# Patient Record
Sex: MALE | Race: WHITE | NOT HISPANIC OR LATINO | Employment: OTHER | URBAN - METROPOLITAN AREA
[De-identification: names, ages, dates, MRNs, and addresses within clinical notes are randomized per-mention and may not be internally consistent; named-entity substitution may affect disease eponyms.]

---

## 2017-10-25 ENCOUNTER — APPOINTMENT (OUTPATIENT)
Dept: CARDIOLOGY | Facility: HOSPITAL | Age: 67
End: 2017-10-25
Attending: INTERNAL MEDICINE

## 2017-10-25 ENCOUNTER — HOSPITAL ENCOUNTER (INPATIENT)
Facility: HOSPITAL | Age: 67
LOS: 2 days | Discharge: HOME | End: 2017-10-27
Attending: INTERNAL MEDICINE | Admitting: INTERNAL MEDICINE

## 2017-10-25 ENCOUNTER — APPOINTMENT (OUTPATIENT)
Dept: GENERAL RADIOLOGY | Facility: HOSPITAL | Age: 67
End: 2017-10-25

## 2017-10-25 DIAGNOSIS — I21.3 ST ELEVATION MYOCARDIAL INFARCTION (STEMI), UNSPECIFIED ARTERY (HCC): Primary | ICD-10-CM

## 2017-10-25 LAB
ALBUMIN SERPL-MCNC: 4.1 G/DL (ref 3.5–5.2)
ALBUMIN/GLOB SERPL: 1.1 G/DL
ALP SERPL-CCNC: 110 U/L (ref 39–117)
ALT SERPL W P-5'-P-CCNC: 36 U/L (ref 1–41)
ANION GAP SERPL CALCULATED.3IONS-SCNC: 15.5 MMOL/L
AST SERPL-CCNC: 25 U/L (ref 1–40)
BASOPHILS # BLD AUTO: 0.03 10*3/MM3 (ref 0–0.2)
BASOPHILS NFR BLD AUTO: 0.2 % (ref 0–1.5)
BH CV ECHO MEAS - ACS: 3 CM
BH CV ECHO MEAS - AO MEAN PG (FULL): 1 MMHG
BH CV ECHO MEAS - AO MEAN PG: 3 MMHG
BH CV ECHO MEAS - AO ROOT AREA (BSA CORRECTED): 1.5
BH CV ECHO MEAS - AO ROOT AREA: 10.2 CM^2
BH CV ECHO MEAS - AO ROOT DIAM: 3.6 CM
BH CV ECHO MEAS - AO V2 MEAN: 77.1 CM/SEC
BH CV ECHO MEAS - AO V2 VTI: 19 CM
BH CV ECHO MEAS - AVA(I,A): 5 CM^2
BH CV ECHO MEAS - AVA(I,D): 5 CM^2
BH CV ECHO MEAS - BSA(HAYCOCK): 2.5 M^2
BH CV ECHO MEAS - BSA: 2.4 M^2
BH CV ECHO MEAS - BZI_BMI: 36.5 KILOGRAMS/M^2
BH CV ECHO MEAS - BZI_METRIC_HEIGHT: 180.3 CM
BH CV ECHO MEAS - BZI_METRIC_WEIGHT: 118.8 KG
BH CV ECHO MEAS - CONTRAST EF (2CH): 59 ML/M^2
BH CV ECHO MEAS - CONTRAST EF 4CH: 52.8 ML/M^2
BH CV ECHO MEAS - EDV(CUBED): 103.8 ML
BH CV ECHO MEAS - EDV(MOD-SP2): 83 ML
BH CV ECHO MEAS - EDV(MOD-SP4): 123 ML
BH CV ECHO MEAS - EDV(TEICH): 102.4 ML
BH CV ECHO MEAS - EF(CUBED): 65.4 %
BH CV ECHO MEAS - EF(MOD-SP2): 59 %
BH CV ECHO MEAS - EF(MOD-SP4): 52.8 %
BH CV ECHO MEAS - EF(TEICH): 56.9 %
BH CV ECHO MEAS - ESV(CUBED): 35.9 ML
BH CV ECHO MEAS - ESV(MOD-SP2): 34 ML
BH CV ECHO MEAS - ESV(MOD-SP4): 58 ML
BH CV ECHO MEAS - ESV(TEICH): 44.1 ML
BH CV ECHO MEAS - FS: 29.8 %
BH CV ECHO MEAS - IVS/LVPW: 1.2
BH CV ECHO MEAS - IVSD: 1.3 CM
BH CV ECHO MEAS - LAT PEAK E' VEL: 8.5 CM/SEC
BH CV ECHO MEAS - LV DIASTOLIC VOL/BSA (35-75): 52 ML/M^2
BH CV ECHO MEAS - LV MASS(C)D: 212 GRAMS
BH CV ECHO MEAS - LV MASS(C)DI: 89.6 GRAMS/M^2
BH CV ECHO MEAS - LV MEAN PG: 2 MMHG
BH CV ECHO MEAS - LV SYSTOLIC VOL/BSA (12-30): 24.5 ML/M^2
BH CV ECHO MEAS - LV V1 MEAN: 72.5 CM/SEC
BH CV ECHO MEAS - LV V1 VTI: 19.3 CM
BH CV ECHO MEAS - LVIDD: 4.7 CM
BH CV ECHO MEAS - LVIDS: 3.3 CM
BH CV ECHO MEAS - LVLD AP2: 8 CM
BH CV ECHO MEAS - LVLD AP4: 8.6 CM
BH CV ECHO MEAS - LVLS AP2: 7.3 CM
BH CV ECHO MEAS - LVLS AP4: 7.3 CM
BH CV ECHO MEAS - LVOT AREA (M): 4.9 CM^2
BH CV ECHO MEAS - LVOT AREA: 4.9 CM^2
BH CV ECHO MEAS - LVOT DIAM: 2.5 CM
BH CV ECHO MEAS - LVPWD: 1.1 CM
BH CV ECHO MEAS - MED PEAK E' VEL: 6.6 CM/SEC
BH CV ECHO MEAS - MV A DUR: 0.17 SEC
BH CV ECHO MEAS - MV A MAX VEL: 85.9 CM/SEC
BH CV ECHO MEAS - MV DEC SLOPE: 522 CM/SEC^2
BH CV ECHO MEAS - MV DEC TIME: 0.16 SEC
BH CV ECHO MEAS - MV E MAX VEL: 83.4 CM/SEC
BH CV ECHO MEAS - MV E/A: 0.97
BH CV ECHO MEAS - MV MEAN PG: 2 MMHG
BH CV ECHO MEAS - MV P1/2T MAX VEL: 95 CM/SEC
BH CV ECHO MEAS - MV P1/2T: 53.3 MSEC
BH CV ECHO MEAS - MV V2 MEAN: 64.8 CM/SEC
BH CV ECHO MEAS - MV V2 VTI: 24.1 CM
BH CV ECHO MEAS - MVA P1/2T LCG: 2.3 CM^2
BH CV ECHO MEAS - MVA(P1/2T): 4.1 CM^2
BH CV ECHO MEAS - MVA(VTI): 3.9 CM^2
BH CV ECHO MEAS - PA ACC SLOPE: 726 CM/SEC^2
BH CV ECHO MEAS - PA ACC TIME: 0.08 SEC
BH CV ECHO MEAS - PA MAX PG (FULL): 0.56 MMHG
BH CV ECHO MEAS - PA MAX PG: 1.5 MMHG
BH CV ECHO MEAS - PA PR(ACCEL): 44.4 MMHG
BH CV ECHO MEAS - PA V2 MAX: 62.1 CM/SEC
BH CV ECHO MEAS - PI END-D VEL: 29.1 CM/SEC
BH CV ECHO MEAS - PULM A REVS DUR: 0.16 SEC
BH CV ECHO MEAS - PULM A REVS VEL: 27.7 CM/SEC
BH CV ECHO MEAS - PULM DIAS VEL: 46.8 CM/SEC
BH CV ECHO MEAS - PULM S/D: 0.9
BH CV ECHO MEAS - PULM SYS VEL: 42 CM/SEC
BH CV ECHO MEAS - PVA(V,A): 3.9 CM^2
BH CV ECHO MEAS - PVA(V,D): 3.9 CM^2
BH CV ECHO MEAS - QP/QS: 0.61
BH CV ECHO MEAS - RV MAX PG: 0.98 MMHG
BH CV ECHO MEAS - RV MEAN PG: 1 MMHG
BH CV ECHO MEAS - RV V1 MAX: 49.6 CM/SEC
BH CV ECHO MEAS - RV V1 MEAN: 37.9 CM/SEC
BH CV ECHO MEAS - RV V1 VTI: 11.8 CM
BH CV ECHO MEAS - RVOT AREA: 4.9 CM^2
BH CV ECHO MEAS - RVOT DIAM: 2.5 CM
BH CV ECHO MEAS - SI(AO): 81.8 ML/M^2
BH CV ECHO MEAS - SI(CUBED): 28.7 ML/M^2
BH CV ECHO MEAS - SI(LVOT): 40.1 ML/M^2
BH CV ECHO MEAS - SI(MOD-SP2): 20.7 ML/M^2
BH CV ECHO MEAS - SI(MOD-SP4): 27.5 ML/M^2
BH CV ECHO MEAS - SI(TEICH): 24.6 ML/M^2
BH CV ECHO MEAS - SV(AO): 193.4 ML
BH CV ECHO MEAS - SV(CUBED): 67.9 ML
BH CV ECHO MEAS - SV(LVOT): 94.7 ML
BH CV ECHO MEAS - SV(MOD-SP2): 49 ML
BH CV ECHO MEAS - SV(MOD-SP4): 65 ML
BH CV ECHO MEAS - SV(RVOT): 57.9 ML
BH CV ECHO MEAS - SV(TEICH): 58.2 ML
BH CV ECHO MEAS - TAPSE (>1.6): 2 CM2
BH CV XLRA - RV BASE: 3.4 CM
BH CV XLRA - RV LENGTH: 8.2 CM
BH CV XLRA - RV MID: 2.9 CM
BH CV XLRA - TDI S': 11.7 CM/SEC
BILIRUB SERPL-MCNC: 0.6 MG/DL (ref 0.1–1.2)
BUN BLD-MCNC: 22 MG/DL (ref 8–23)
BUN/CREAT SERPL: 18.8 (ref 7–25)
CALCIUM SPEC-SCNC: 9.4 MG/DL (ref 8.6–10.5)
CHLORIDE SERPL-SCNC: 101 MMOL/L (ref 98–107)
CO2 SERPL-SCNC: 23.5 MMOL/L (ref 22–29)
CREAT BLD-MCNC: 1.17 MG/DL (ref 0.76–1.27)
DEPRECATED RDW RBC AUTO: 50.4 FL (ref 37–54)
E/E' RATIO: 11.2
EOSINOPHIL # BLD AUTO: 0.01 10*3/MM3 (ref 0–0.7)
EOSINOPHIL NFR BLD AUTO: 0.1 % (ref 0.3–6.2)
ERYTHROCYTE [DISTWIDTH] IN BLOOD BY AUTOMATED COUNT: 14.2 % (ref 11.5–14.5)
GFR SERPL CREATININE-BSD FRML MDRD: 63 ML/MIN/1.73
GLOBULIN UR ELPH-MCNC: 3.6 GM/DL
GLUCOSE BLD-MCNC: 146 MG/DL (ref 65–99)
GLUCOSE BLDC GLUCOMTR-MCNC: 105 MG/DL (ref 70–130)
GLUCOSE BLDC GLUCOMTR-MCNC: 108 MG/DL (ref 70–130)
GLUCOSE BLDC GLUCOMTR-MCNC: 127 MG/DL (ref 70–130)
GLUCOSE BLDC GLUCOMTR-MCNC: 171 MG/DL (ref 70–130)
HCT VFR BLD AUTO: 47.3 % (ref 40.4–52.2)
HGB BLD-MCNC: 15.5 G/DL (ref 13.7–17.6)
HOLD SPECIMEN: NORMAL
HOLD SPECIMEN: NORMAL
IMM GRANULOCYTES # BLD: 0.05 10*3/MM3 (ref 0–0.03)
IMM GRANULOCYTES NFR BLD: 0.3 % (ref 0–0.5)
LEFT ATRIUM VOLUME INDEX: 20 ML/M2
LYMPHOCYTES # BLD AUTO: 0.56 10*3/MM3 (ref 0.9–4.8)
LYMPHOCYTES NFR BLD AUTO: 3.2 % (ref 19.6–45.3)
MCH RBC QN AUTO: 31.8 PG (ref 27–32.7)
MCHC RBC AUTO-ENTMCNC: 32.8 G/DL (ref 32.6–36.4)
MCV RBC AUTO: 97.1 FL (ref 79.8–96.2)
MONOCYTES # BLD AUTO: 1.05 10*3/MM3 (ref 0.2–1.2)
MONOCYTES NFR BLD AUTO: 6.1 % (ref 5–12)
NEUTROPHILS # BLD AUTO: 15.58 10*3/MM3 (ref 1.9–8.1)
NEUTROPHILS NFR BLD AUTO: 90.1 % (ref 42.7–76)
PLATELET # BLD AUTO: 300 10*3/MM3 (ref 140–500)
PMV BLD AUTO: 11.2 FL (ref 6–12)
POTASSIUM BLD-SCNC: 4.5 MMOL/L (ref 3.5–5.2)
PROT SERPL-MCNC: 7.7 G/DL (ref 6–8.5)
RBC # BLD AUTO: 4.87 10*6/MM3 (ref 4.6–6)
SODIUM BLD-SCNC: 140 MMOL/L (ref 136–145)
TROPONIN T SERPL-MCNC: 0.03 NG/ML (ref 0–0.03)
TROPONIN T SERPL-MCNC: 4.03 NG/ML (ref 0–0.03)
WBC NRBC COR # BLD: 17.28 10*3/MM3 (ref 4.5–10.7)
WHOLE BLOOD HOLD SPECIMEN: NORMAL
WHOLE BLOOD HOLD SPECIMEN: NORMAL

## 2017-10-25 PROCEDURE — 93306 TTE W/DOPPLER COMPLETE: CPT

## 2017-10-25 PROCEDURE — C1769 GUIDE WIRE: HCPCS | Performed by: INTERNAL MEDICINE

## 2017-10-25 PROCEDURE — B2111ZZ FLUOROSCOPY OF MULTIPLE CORONARY ARTERIES USING LOW OSMOLAR CONTRAST: ICD-10-PCS | Performed by: INTERNAL MEDICINE

## 2017-10-25 PROCEDURE — 94799 UNLISTED PULMONARY SVC/PX: CPT

## 2017-10-25 PROCEDURE — 0 IOPAMIDOL PER 1 ML: Performed by: INTERNAL MEDICINE

## 2017-10-25 PROCEDURE — 85347 COAGULATION TIME ACTIVATED: CPT

## 2017-10-25 PROCEDURE — 71010 HC CHEST PA OR AP: CPT

## 2017-10-25 PROCEDURE — 4A023N7 MEASUREMENT OF CARDIAC SAMPLING AND PRESSURE, LEFT HEART, PERCUTANEOUS APPROACH: ICD-10-PCS | Performed by: INTERNAL MEDICINE

## 2017-10-25 PROCEDURE — B2151ZZ FLUOROSCOPY OF LEFT HEART USING LOW OSMOLAR CONTRAST: ICD-10-PCS | Performed by: INTERNAL MEDICINE

## 2017-10-25 PROCEDURE — C9606 PERC D-E COR REVASC W AMI S: HCPCS | Performed by: INTERNAL MEDICINE

## 2017-10-25 PROCEDURE — 25010000002 HEPARIN (PORCINE) PER 1000 UNITS: Performed by: INTERNAL MEDICINE

## 2017-10-25 PROCEDURE — 85025 COMPLETE CBC W/AUTO DIFF WBC: CPT | Performed by: FAMILY MEDICINE

## 2017-10-25 PROCEDURE — 02C13ZZ EXTIRPATION OF MATTER FROM CORONARY ARTERY, TWO ARTERIES, PERCUTANEOUS APPROACH: ICD-10-PCS | Performed by: INTERNAL MEDICINE

## 2017-10-25 PROCEDURE — 94760 N-INVAS EAR/PLS OXIMETRY 1: CPT

## 2017-10-25 PROCEDURE — 63710000001 PREDNISONE PER 5 MG: Performed by: INTERNAL MEDICINE

## 2017-10-25 PROCEDURE — C1725 CATH, TRANSLUMIN NON-LASER: HCPCS | Performed by: INTERNAL MEDICINE

## 2017-10-25 PROCEDURE — C1887 CATHETER, GUIDING: HCPCS | Performed by: INTERNAL MEDICINE

## 2017-10-25 PROCEDURE — 92921: CPT | Performed by: INTERNAL MEDICINE

## 2017-10-25 PROCEDURE — 92941 PRQ TRLML REVSC TOT OCCL AMI: CPT | Performed by: INTERNAL MEDICINE

## 2017-10-25 PROCEDURE — 25010000002 FENTANYL CITRATE (PF) 100 MCG/2ML SOLUTION: Performed by: INTERNAL MEDICINE

## 2017-10-25 PROCEDURE — C1894 INTRO/SHEATH, NON-LASER: HCPCS | Performed by: INTERNAL MEDICINE

## 2017-10-25 PROCEDURE — 93005 ELECTROCARDIOGRAM TRACING: CPT | Performed by: FAMILY MEDICINE

## 2017-10-25 PROCEDURE — 25010000002 BH (CUPID ONLY) ADENOSINE 6 MG/100ML MIXTURE: Performed by: INTERNAL MEDICINE

## 2017-10-25 PROCEDURE — 80053 COMPREHEN METABOLIC PANEL: CPT | Performed by: FAMILY MEDICINE

## 2017-10-25 PROCEDURE — 3E033PZ INTRODUCTION OF PLATELET INHIBITOR INTO PERIPHERAL VEIN, PERCUTANEOUS APPROACH: ICD-10-PCS | Performed by: INTERNAL MEDICINE

## 2017-10-25 PROCEDURE — 93005 ELECTROCARDIOGRAM TRACING: CPT | Performed by: INTERNAL MEDICINE

## 2017-10-25 PROCEDURE — 93306 TTE W/DOPPLER COMPLETE: CPT | Performed by: INTERNAL MEDICINE

## 2017-10-25 PROCEDURE — 84484 ASSAY OF TROPONIN QUANT: CPT | Performed by: INTERNAL MEDICINE

## 2017-10-25 PROCEDURE — C1757 CATH, THROMBECTOMY/EMBOLECT: HCPCS | Performed by: INTERNAL MEDICINE

## 2017-10-25 PROCEDURE — 027034Z DILATION OF CORONARY ARTERY, ONE ARTERY WITH DRUG-ELUTING INTRALUMINAL DEVICE, PERCUTANEOUS APPROACH: ICD-10-PCS | Performed by: INTERNAL MEDICINE

## 2017-10-25 PROCEDURE — C1874 STENT, COATED/COV W/DEL SYS: HCPCS | Performed by: INTERNAL MEDICINE

## 2017-10-25 PROCEDURE — 84484 ASSAY OF TROPONIN QUANT: CPT | Performed by: FAMILY MEDICINE

## 2017-10-25 PROCEDURE — 93010 ELECTROCARDIOGRAM REPORT: CPT | Performed by: INTERNAL MEDICINE

## 2017-10-25 PROCEDURE — 93458 L HRT ARTERY/VENTRICLE ANGIO: CPT | Performed by: INTERNAL MEDICINE

## 2017-10-25 PROCEDURE — 99291 CRITICAL CARE FIRST HOUR: CPT

## 2017-10-25 PROCEDURE — 25010000002 MIDAZOLAM PER 1 MG: Performed by: INTERNAL MEDICINE

## 2017-10-25 PROCEDURE — 82962 GLUCOSE BLOOD TEST: CPT

## 2017-10-25 PROCEDURE — 99223 1ST HOSP IP/OBS HIGH 75: CPT | Performed by: INTERNAL MEDICINE

## 2017-10-25 PROCEDURE — 92921 PR PRQ TRLUML CORONARY ANGIOPLASTY ADDL BRANCH: CPT | Performed by: INTERNAL MEDICINE

## 2017-10-25 PROCEDURE — 02703ZZ DILATION OF CORONARY ARTERY, ONE ARTERY, PERCUTANEOUS APPROACH: ICD-10-PCS | Performed by: INTERNAL MEDICINE

## 2017-10-25 PROCEDURE — 99284 EMERGENCY DEPT VISIT MOD MDM: CPT

## 2017-10-25 DEVICE — XIENCE ALPINE EVEROLIMUS ELUTING CORONARY STENT SYSTEM 3.50 MM X 38 MM / RAPID-EXCHANGE
Type: IMPLANTABLE DEVICE | Status: FUNCTIONAL
Brand: XIENCE ALPINE

## 2017-10-25 RX ORDER — PRAVASTATIN SODIUM 20 MG
20 TABLET ORAL DAILY
COMMUNITY
End: 2017-10-27 | Stop reason: HOSPADM

## 2017-10-25 RX ORDER — SPIRONOLACTONE 50 MG/1
50 TABLET, FILM COATED ORAL DAILY
COMMUNITY
End: 2017-10-27 | Stop reason: HOSPADM

## 2017-10-25 RX ORDER — ONDANSETRON 4 MG/1
4 TABLET, ORALLY DISINTEGRATING ORAL EVERY 6 HOURS PRN
Status: DISCONTINUED | OUTPATIENT
Start: 2017-10-25 | End: 2017-10-27 | Stop reason: HOSPADM

## 2017-10-25 RX ORDER — ONDANSETRON 2 MG/ML
4 INJECTION INTRAMUSCULAR; INTRAVENOUS EVERY 6 HOURS PRN
Status: DISCONTINUED | OUTPATIENT
Start: 2017-10-25 | End: 2017-10-27 | Stop reason: HOSPADM

## 2017-10-25 RX ORDER — ACETAMINOPHEN 325 MG/1
650 TABLET ORAL EVERY 4 HOURS PRN
Status: DISCONTINUED | OUTPATIENT
Start: 2017-10-25 | End: 2017-10-27 | Stop reason: HOSPADM

## 2017-10-25 RX ORDER — HEPARIN SODIUM 1000 [USP'U]/ML
INJECTION, SOLUTION INTRAVENOUS; SUBCUTANEOUS AS NEEDED
Status: DISCONTINUED | OUTPATIENT
Start: 2017-10-25 | End: 2017-10-25 | Stop reason: HOSPADM

## 2017-10-25 RX ORDER — PREDNISONE 1 MG/1
1 TABLET ORAL 3 TIMES DAILY
Status: DISCONTINUED | OUTPATIENT
Start: 2017-10-25 | End: 2017-10-27 | Stop reason: HOSPADM

## 2017-10-25 RX ORDER — FOLIC ACID 1 MG/1
1 TABLET ORAL DAILY
COMMUNITY
End: 2021-01-01 | Stop reason: ALTCHOICE

## 2017-10-25 RX ORDER — SODIUM CHLORIDE 9 MG/ML
50 INJECTION, SOLUTION INTRAVENOUS CONTINUOUS
Status: ACTIVE | OUTPATIENT
Start: 2017-10-25 | End: 2017-10-25

## 2017-10-25 RX ORDER — ALLOPURINOL 100 MG/1
200 TABLET ORAL DAILY
Status: DISCONTINUED | OUTPATIENT
Start: 2017-10-25 | End: 2017-10-27 | Stop reason: HOSPADM

## 2017-10-25 RX ORDER — HYDRALAZINE HYDROCHLORIDE 100 MG/1
100 TABLET, FILM COATED ORAL 2 TIMES DAILY
COMMUNITY
End: 2017-10-27 | Stop reason: HOSPADM

## 2017-10-25 RX ORDER — ALLOPURINOL 100 MG/1
200 TABLET ORAL DAILY
COMMUNITY
End: 2019-09-06 | Stop reason: DRUGHIGH

## 2017-10-25 RX ORDER — NITROGLYCERIN 20 MG/100ML
INJECTION INTRAVENOUS
Status: COMPLETED | OUTPATIENT
Start: 2017-10-25 | End: 2017-10-25

## 2017-10-25 RX ORDER — VALSARTAN 160 MG/1
320 TABLET ORAL DAILY
COMMUNITY
End: 2017-10-27 | Stop reason: HOSPADM

## 2017-10-25 RX ORDER — ASPIRIN 325 MG
325 TABLET ORAL ONCE
Status: DISCONTINUED | OUTPATIENT
Start: 2017-10-25 | End: 2017-10-27 | Stop reason: HOSPADM

## 2017-10-25 RX ORDER — PHENYLEPHRINE HCL IN 0.9% NACL 0.5 MG/5ML
SYRINGE (ML) INTRAVENOUS AS NEEDED
Status: DISCONTINUED | OUTPATIENT
Start: 2017-10-25 | End: 2017-10-25 | Stop reason: HOSPADM

## 2017-10-25 RX ORDER — AMLODIPINE BESYLATE 5 MG/1
5 TABLET ORAL DAILY
COMMUNITY
End: 2017-10-27 | Stop reason: HOSPADM

## 2017-10-25 RX ORDER — ATROPINE SULFATE 1 MG/ML
INJECTION, SOLUTION INTRAMUSCULAR; INTRAVENOUS; SUBCUTANEOUS AS NEEDED
Status: DISCONTINUED | OUTPATIENT
Start: 2017-10-25 | End: 2017-10-25 | Stop reason: HOSPADM

## 2017-10-25 RX ORDER — CLOPIDOGREL BISULFATE 75 MG/1
TABLET ORAL
Status: COMPLETED | OUTPATIENT
Start: 2017-10-25 | End: 2017-10-25

## 2017-10-25 RX ORDER — ASPIRIN 81 MG/1
81 TABLET ORAL DAILY
COMMUNITY
End: 2021-01-01

## 2017-10-25 RX ORDER — SODIUM CHLORIDE 9 MG/ML
INJECTION, SOLUTION INTRAVENOUS CONTINUOUS PRN
Status: DISCONTINUED | OUTPATIENT
Start: 2017-10-25 | End: 2017-10-25 | Stop reason: HOSPADM

## 2017-10-25 RX ORDER — ASPIRIN 81 MG/1
81 TABLET ORAL DAILY
Status: DISCONTINUED | OUTPATIENT
Start: 2017-10-25 | End: 2017-10-27 | Stop reason: HOSPADM

## 2017-10-25 RX ORDER — PREDNISONE 1 MG/1
1 TABLET ORAL 3 TIMES DAILY
COMMUNITY

## 2017-10-25 RX ORDER — VITAMIN B COMPLEX
100 TABLET ORAL DAILY
COMMUNITY
End: 2021-01-01

## 2017-10-25 RX ORDER — MORPHINE SULFATE 2 MG/ML
1 INJECTION, SOLUTION INTRAMUSCULAR; INTRAVENOUS EVERY 4 HOURS PRN
Status: DISCONTINUED | OUTPATIENT
Start: 2017-10-25 | End: 2017-10-27 | Stop reason: HOSPADM

## 2017-10-25 RX ORDER — NALOXONE HCL 0.4 MG/ML
0.4 VIAL (ML) INJECTION
Status: DISCONTINUED | OUTPATIENT
Start: 2017-10-25 | End: 2017-10-27 | Stop reason: HOSPADM

## 2017-10-25 RX ORDER — FUROSEMIDE 40 MG/1
40 TABLET ORAL DAILY
COMMUNITY
End: 2017-10-27 | Stop reason: HOSPADM

## 2017-10-25 RX ORDER — FOLIC ACID 1 MG/1
1 TABLET ORAL DAILY
Status: DISCONTINUED | OUTPATIENT
Start: 2017-10-25 | End: 2017-10-27 | Stop reason: HOSPADM

## 2017-10-25 RX ORDER — HYDROCODONE BITARTRATE AND ACETAMINOPHEN 5; 325 MG/1; MG/1
1 TABLET ORAL EVERY 4 HOURS PRN
Status: DISCONTINUED | OUTPATIENT
Start: 2017-10-25 | End: 2017-10-27 | Stop reason: HOSPADM

## 2017-10-25 RX ORDER — MIDAZOLAM HYDROCHLORIDE 1 MG/ML
INJECTION INTRAMUSCULAR; INTRAVENOUS AS NEEDED
Status: DISCONTINUED | OUTPATIENT
Start: 2017-10-25 | End: 2017-10-25 | Stop reason: HOSPADM

## 2017-10-25 RX ORDER — CLONIDINE HYDROCHLORIDE 0.1 MG/1
0.1 TABLET ORAL DAILY
COMMUNITY
End: 2017-10-27 | Stop reason: HOSPADM

## 2017-10-25 RX ORDER — ATORVASTATIN CALCIUM 80 MG/1
80 TABLET, FILM COATED ORAL DAILY
Status: DISCONTINUED | OUTPATIENT
Start: 2017-10-25 | End: 2017-10-27 | Stop reason: HOSPADM

## 2017-10-25 RX ORDER — SODIUM CHLORIDE 0.9 % (FLUSH) 0.9 %
10 SYRINGE (ML) INJECTION AS NEEDED
Status: DISCONTINUED | OUTPATIENT
Start: 2017-10-25 | End: 2017-10-27 | Stop reason: HOSPADM

## 2017-10-25 RX ORDER — FENTANYL CITRATE 50 UG/ML
INJECTION, SOLUTION INTRAMUSCULAR; INTRAVENOUS AS NEEDED
Status: DISCONTINUED | OUTPATIENT
Start: 2017-10-25 | End: 2017-10-25 | Stop reason: HOSPADM

## 2017-10-25 RX ORDER — ONDANSETRON 4 MG/1
4 TABLET, FILM COATED ORAL EVERY 6 HOURS PRN
Status: DISCONTINUED | OUTPATIENT
Start: 2017-10-25 | End: 2017-10-27 | Stop reason: HOSPADM

## 2017-10-25 RX ORDER — TEMAZEPAM 15 MG/1
15 CAPSULE ORAL NIGHTLY PRN
Status: DISCONTINUED | OUTPATIENT
Start: 2017-10-25 | End: 2017-10-27 | Stop reason: HOSPADM

## 2017-10-25 RX ADMIN — PREDNISONE 1 MG: 1 TABLET ORAL at 09:25

## 2017-10-25 RX ADMIN — PREDNISONE 1 MG: 1 TABLET ORAL at 20:04

## 2017-10-25 RX ADMIN — TICAGRELOR 90 MG: 90 TABLET ORAL at 17:40

## 2017-10-25 RX ADMIN — TICAGRELOR 90 MG: 90 TABLET ORAL at 09:24

## 2017-10-25 RX ADMIN — FOLIC ACID 1 MG: 1 TABLET ORAL at 09:25

## 2017-10-25 RX ADMIN — CLOPIDOGREL BISULFATE 600 MG: 75 TABLET, FILM COATED ORAL at 06:46

## 2017-10-25 RX ADMIN — PREDNISONE 1 MG: 1 TABLET ORAL at 17:40

## 2017-10-25 RX ADMIN — SODIUM CHLORIDE 50 ML/HR: 9 INJECTION, SOLUTION INTRAVENOUS at 08:17

## 2017-10-25 RX ADMIN — ALLOPURINOL 200 MG: 100 TABLET ORAL at 09:25

## 2017-10-25 RX ADMIN — ATORVASTATIN CALCIUM 80 MG: 80 TABLET, FILM COATED ORAL at 09:25

## 2017-10-25 RX ADMIN — ASPIRIN 81 MG: 81 TABLET ORAL at 09:25

## 2017-10-25 RX ADMIN — NITROGLYCERIN 20 MCG/MIN: 20 INJECTION INTRAVENOUS at 06:32

## 2017-10-26 ENCOUNTER — APPOINTMENT (OUTPATIENT)
Dept: GENERAL RADIOLOGY | Facility: HOSPITAL | Age: 67
End: 2017-10-26
Attending: INTERNAL MEDICINE

## 2017-10-26 LAB
ACT BLD: 268 SECONDS (ref 82–152)
ANION GAP SERPL CALCULATED.3IONS-SCNC: 9.4 MMOL/L
BUN BLD-MCNC: 20 MG/DL (ref 8–23)
BUN/CREAT SERPL: 19.8 (ref 7–25)
CALCIUM SPEC-SCNC: 8.9 MG/DL (ref 8.6–10.5)
CHLORIDE SERPL-SCNC: 103 MMOL/L (ref 98–107)
CHOLEST SERPL-MCNC: 123 MG/DL (ref 0–200)
CO2 SERPL-SCNC: 25.6 MMOL/L (ref 22–29)
CREAT BLD-MCNC: 1.01 MG/DL (ref 0.76–1.27)
DEPRECATED RDW RBC AUTO: 51.2 FL (ref 37–54)
ERYTHROCYTE [DISTWIDTH] IN BLOOD BY AUTOMATED COUNT: 14.2 % (ref 11.5–14.5)
GFR SERPL CREATININE-BSD FRML MDRD: 74 ML/MIN/1.73
GLUCOSE BLD-MCNC: 114 MG/DL (ref 65–99)
GLUCOSE BLDC GLUCOMTR-MCNC: 104 MG/DL (ref 70–130)
GLUCOSE BLDC GLUCOMTR-MCNC: 107 MG/DL (ref 70–130)
GLUCOSE BLDC GLUCOMTR-MCNC: 120 MG/DL (ref 70–130)
GLUCOSE BLDC GLUCOMTR-MCNC: 98 MG/DL (ref 70–130)
HBA1C MFR BLD: 5.11 % (ref 4.8–5.6)
HCT VFR BLD AUTO: 42.4 % (ref 40.4–52.2)
HDLC SERPL-MCNC: 39 MG/DL (ref 40–60)
HGB BLD-MCNC: 13.3 G/DL (ref 13.7–17.6)
LDLC SERPL CALC-MCNC: 69 MG/DL (ref 0–100)
LDLC/HDLC SERPL: 1.76 {RATIO}
MCH RBC QN AUTO: 31.2 PG (ref 27–32.7)
MCHC RBC AUTO-ENTMCNC: 31.4 G/DL (ref 32.6–36.4)
MCV RBC AUTO: 99.5 FL (ref 79.8–96.2)
PLATELET # BLD AUTO: 238 10*3/MM3 (ref 140–500)
PMV BLD AUTO: 10.7 FL (ref 6–12)
POTASSIUM BLD-SCNC: 4.1 MMOL/L (ref 3.5–5.2)
RBC # BLD AUTO: 4.26 10*6/MM3 (ref 4.6–6)
SODIUM BLD-SCNC: 138 MMOL/L (ref 136–145)
TRIGL SERPL-MCNC: 76 MG/DL (ref 0–150)
VLDLC SERPL-MCNC: 15.2 MG/DL (ref 5–40)
WBC NRBC COR # BLD: 16.79 10*3/MM3 (ref 4.5–10.7)

## 2017-10-26 PROCEDURE — 99232 SBSQ HOSP IP/OBS MODERATE 35: CPT | Performed by: INTERNAL MEDICINE

## 2017-10-26 PROCEDURE — 71010 HC CHEST PA OR AP: CPT

## 2017-10-26 PROCEDURE — 63710000001 PREDNISONE PER 5 MG: Performed by: INTERNAL MEDICINE

## 2017-10-26 PROCEDURE — 93005 ELECTROCARDIOGRAM TRACING: CPT | Performed by: INTERNAL MEDICINE

## 2017-10-26 PROCEDURE — 80048 BASIC METABOLIC PNL TOTAL CA: CPT | Performed by: INTERNAL MEDICINE

## 2017-10-26 PROCEDURE — 85027 COMPLETE CBC AUTOMATED: CPT | Performed by: INTERNAL MEDICINE

## 2017-10-26 PROCEDURE — 80061 LIPID PANEL: CPT | Performed by: INTERNAL MEDICINE

## 2017-10-26 PROCEDURE — 83036 HEMOGLOBIN GLYCOSYLATED A1C: CPT | Performed by: INTERNAL MEDICINE

## 2017-10-26 PROCEDURE — 93010 ELECTROCARDIOGRAM REPORT: CPT | Performed by: INTERNAL MEDICINE

## 2017-10-26 PROCEDURE — 82962 GLUCOSE BLOOD TEST: CPT

## 2017-10-26 RX ADMIN — ALLOPURINOL 200 MG: 100 TABLET ORAL at 09:10

## 2017-10-26 RX ADMIN — FOLIC ACID 1 MG: 1 TABLET ORAL at 09:10

## 2017-10-26 RX ADMIN — METOPROLOL TARTRATE 25 MG: 25 TABLET ORAL at 20:46

## 2017-10-26 RX ADMIN — TICAGRELOR 90 MG: 90 TABLET ORAL at 09:10

## 2017-10-26 RX ADMIN — ATORVASTATIN CALCIUM 80 MG: 80 TABLET, FILM COATED ORAL at 09:10

## 2017-10-26 RX ADMIN — METOPROLOL TARTRATE 25 MG: 25 TABLET ORAL at 10:32

## 2017-10-26 RX ADMIN — PREDNISONE 1 MG: 1 TABLET ORAL at 20:46

## 2017-10-26 RX ADMIN — PREDNISONE 1 MG: 1 TABLET ORAL at 16:47

## 2017-10-26 RX ADMIN — TICAGRELOR 90 MG: 90 TABLET ORAL at 17:05

## 2017-10-26 RX ADMIN — PREDNISONE 1 MG: 1 TABLET ORAL at 09:10

## 2017-10-26 RX ADMIN — ASPIRIN 81 MG: 81 TABLET ORAL at 09:10

## 2017-10-27 VITALS
SYSTOLIC BLOOD PRESSURE: 123 MMHG | RESPIRATION RATE: 18 BRPM | TEMPERATURE: 99 F | WEIGHT: 276.3 LBS | HEART RATE: 71 BPM | BODY MASS INDEX: 38.68 KG/M2 | OXYGEN SATURATION: 92 % | HEIGHT: 71 IN | DIASTOLIC BLOOD PRESSURE: 88 MMHG

## 2017-10-27 LAB
GLUCOSE BLDC GLUCOMTR-MCNC: 90 MG/DL (ref 70–130)
GLUCOSE BLDC GLUCOMTR-MCNC: 97 MG/DL (ref 70–130)

## 2017-10-27 PROCEDURE — 99238 HOSP IP/OBS DSCHRG MGMT 30/<: CPT | Performed by: INTERNAL MEDICINE

## 2017-10-27 PROCEDURE — 82962 GLUCOSE BLOOD TEST: CPT

## 2017-10-27 PROCEDURE — 63710000001 PREDNISONE PER 5 MG: Performed by: INTERNAL MEDICINE

## 2017-10-27 RX ORDER — ATORVASTATIN CALCIUM 80 MG/1
80 TABLET, FILM COATED ORAL DAILY
Qty: 30 TABLET | Refills: 5 | Status: SHIPPED | OUTPATIENT
Start: 2017-10-28 | End: 2018-01-04 | Stop reason: SINTOL

## 2017-10-27 RX ADMIN — ALLOPURINOL 200 MG: 100 TABLET ORAL at 09:00

## 2017-10-27 RX ADMIN — FOLIC ACID 1 MG: 1 TABLET ORAL at 09:00

## 2017-10-27 RX ADMIN — ATORVASTATIN CALCIUM 80 MG: 80 TABLET, FILM COATED ORAL at 09:00

## 2017-10-27 RX ADMIN — TICAGRELOR 90 MG: 90 TABLET ORAL at 09:00

## 2017-10-27 RX ADMIN — METOPROLOL TARTRATE 25 MG: 25 TABLET ORAL at 09:00

## 2017-10-27 RX ADMIN — ASPIRIN 81 MG: 81 TABLET ORAL at 09:00

## 2017-10-27 RX ADMIN — PREDNISONE 1 MG: 1 TABLET ORAL at 09:00

## 2017-11-03 ENCOUNTER — OFFICE VISIT (OUTPATIENT)
Dept: CARDIOLOGY | Facility: CLINIC | Age: 67
End: 2017-11-03

## 2017-11-03 VITALS
HEART RATE: 65 BPM | SYSTOLIC BLOOD PRESSURE: 138 MMHG | BODY MASS INDEX: 36.82 KG/M2 | DIASTOLIC BLOOD PRESSURE: 86 MMHG | OXYGEN SATURATION: 99 % | HEIGHT: 71 IN | WEIGHT: 263 LBS

## 2017-11-03 DIAGNOSIS — I25.10 CORONARY ARTERY DISEASE INVOLVING NATIVE CORONARY ARTERY OF NATIVE HEART WITHOUT ANGINA PECTORIS: Primary | ICD-10-CM

## 2017-11-03 DIAGNOSIS — Z95.5 HISTORY OF CORONARY ARTERY STENT PLACEMENT: ICD-10-CM

## 2017-11-03 PROBLEM — I21.3 ST ELEVATION MYOCARDIAL INFARCTION (STEMI) (HCC): Status: RESOLVED | Noted: 2017-10-25 | Resolved: 2017-11-03

## 2017-11-03 PROCEDURE — 99213 OFFICE O/P EST LOW 20 MIN: CPT | Performed by: PHYSICIAN ASSISTANT

## 2017-11-03 PROCEDURE — 93000 ELECTROCARDIOGRAM COMPLETE: CPT | Performed by: PHYSICIAN ASSISTANT

## 2017-11-16 ENCOUNTER — OFFICE VISIT (OUTPATIENT)
Dept: CARDIOLOGY | Facility: CLINIC | Age: 67
End: 2017-11-16

## 2017-11-16 VITALS
HEART RATE: 65 BPM | DIASTOLIC BLOOD PRESSURE: 92 MMHG | HEIGHT: 71 IN | SYSTOLIC BLOOD PRESSURE: 140 MMHG | WEIGHT: 254 LBS | BODY MASS INDEX: 35.56 KG/M2

## 2017-11-16 DIAGNOSIS — I73.9 CLAUDICATION (HCC): ICD-10-CM

## 2017-11-16 DIAGNOSIS — E78.2 MIXED HYPERLIPIDEMIA: ICD-10-CM

## 2017-11-16 DIAGNOSIS — Z95.5 HISTORY OF CORONARY ARTERY STENT PLACEMENT: ICD-10-CM

## 2017-11-16 DIAGNOSIS — I25.10 CORONARY ARTERY DISEASE INVOLVING NATIVE CORONARY ARTERY OF NATIVE HEART WITHOUT ANGINA PECTORIS: Primary | ICD-10-CM

## 2017-11-16 PROCEDURE — 93000 ELECTROCARDIOGRAM COMPLETE: CPT | Performed by: INTERNAL MEDICINE

## 2017-11-16 PROCEDURE — 99214 OFFICE O/P EST MOD 30 MIN: CPT | Performed by: INTERNAL MEDICINE

## 2017-11-16 RX ORDER — SODIUM PHOSPHATE,MONO-DIBASIC 19G-7G/118
1 ENEMA (ML) RECTAL 2 TIMES DAILY WITH MEALS
COMMUNITY
End: 2021-01-01

## 2017-11-16 NOTE — PROGRESS NOTES
Gabriele Connolly  1950  Date of Office Visit: 11/16/2017  Encounter Provider: Contreras Guillory MD  Place of Service: [facility] CARDIOLOGY      CHIEF COMPLAINT:  Coronary artery disease with prior inferior STEMI and PCI  Essential hypertension    HISTORY OF PRESENT ILLNESS:  Dr. Nunn,  I had the pleasure of seeing Mr. oCnnolly back in follow-up.  He is a patient with a past medical history significant for CVA, hypertension, and tobacco abuse.  On 10/25/2017 he presented to the emergency room with complaints of severe chest pain and was found to be having a inferior STEMI.  Cardiac catheterization revealed a normal left main, mild luminal irregularities in the mid LAD, 20% mid circumflex with normal OM1 and OM 2, and a 99% mid RCA stenosis with an ulcerated plaque and large thrombus burden.  He underwent successful thrombectomy and drug-eluting stent placement to the RCA lesion.  An echocardiogram performed after his procedure showed normal LV function with an EF of 53% and hypokinesis of the inferior wall.  There were no valvular abnormalities.  He was discharged home on 10/27/2017 in stable condition.     Since his last visit, he states that he has been doing much better. He denies any chest pain at rest or with exertion. He does state that he has mild left shoulder pain that increases with abduction. This is improved with immobility. This does not increase with walking. He also states that occasionally he will have a mild left-sided calf discomfort when he walks greater than 200 feet that improves with rest. He denies any other symptoms associated with that or nonhealing ulcerations.             Review of Systems   Constitution: Negative for fever, weakness and malaise/fatigue.   HENT: Negative for nosebleeds and sore throat.    Eyes: Negative for blurred vision and double vision.   Cardiovascular: Negative for chest pain, claudication, palpitations and syncope.  "  Respiratory: Negative for cough, shortness of breath and snoring.    Endocrine: Negative for cold intolerance, heat intolerance and polydipsia.   Skin: Negative for itching, poor wound healing and rash.   Musculoskeletal: Negative for joint pain, joint swelling, muscle weakness and myalgias.   Gastrointestinal: Negative for abdominal pain, melena, nausea and vomiting.   Neurological: Negative for light-headedness, loss of balance, seizures and vertigo.   Psychiatric/Behavioral: Negative for altered mental status and depression.       Past Medical History:   Diagnosis Date   • Arthritis    • ASCVD (arteriosclerotic cardiovascular disease)    • Cerebral infarct    • Coronary artery disease    • Gout    • Hypertension    • Pneumonia    • ST elevation myocardial infarction (STEMI) 10/25/2017   • Stroke    • TIA (transient ischemic attack)        The following portions of the patient's history were reviewed and updated as appropriate: Social history , Family history and Surgical history     Current Outpatient Prescriptions on File Prior to Visit   Medication Sig Dispense Refill   • allopurinol (ZYLOPRIM) 100 MG tablet Take 200 mg by mouth Daily.     • aspirin 81 MG EC tablet Take 81 mg by mouth Daily.     • atorvastatin (LIPITOR) 80 MG tablet Take 1 tablet by mouth Daily. 30 tablet 5   • Coenzyme Q10 (COQ10) 100 MG capsule Take 100 mg by mouth Daily.     • folic acid (FOLVITE) 1 MG tablet Take 1 mg by mouth Daily.     • methotrexate 2.5 MG tablet Take 10 mg by mouth 1 (One) Time Per Week. Patient takes four 2.5 mg tablets once a week on mondays per patient and patient's wife. Written list of medications states \"2.5 mg four per week\"     • metoprolol tartrate (LOPRESSOR) 25 MG tablet Take 1 tablet by mouth Every 12 (Twelve) Hours. 60 tablet 5   • predniSONE (DELTASONE) 1 MG tablet Take 1 mg by mouth 3 (Three) Times a Day.     • ticagrelor (BRILINTA) 90 MG tablet tablet Take 1 tablet by mouth 2 (Two) Times a Day. 60 " "tablet 5     No current facility-administered medications on file prior to visit.        No Known Allergies    Vitals:    11/16/17 0957   BP: 140/92   Pulse: 65   Weight: 254 lb (115 kg)   Height: 71\" (180.3 cm)     Physical Exam   Constitutional: He is oriented to person, place, and time. He appears well-developed and well-nourished.   HENT:   Head: Normocephalic and atraumatic.   Eyes: Conjunctivae and EOM are normal. No scleral icterus.   Neck: Normal range of motion. Neck supple. Normal carotid pulses, no hepatojugular reflux and no JVD present. Carotid bruit is not present. No tracheal deviation present. No thyromegaly present.   Cardiovascular: Normal rate and regular rhythm.  Exam reveals no gallop and no friction rub.    No murmur heard.  Pulses:       Carotid pulses are 2+ on the right side, and 2+ on the left side.       Radial pulses are 2+ on the right side, and 2+ on the left side.        Femoral pulses are 2+ on the right side, and 2+ on the left side.       Dorsalis pedis pulses are 2+ on the right side, and 2+ on the left side.        Posterior tibial pulses are 2+ on the right side, and 2+ on the left side.   Pulmonary/Chest: Breath sounds normal. No respiratory distress. He has no decreased breath sounds. He has no wheezes. He has no rhonchi. He has no rales. He exhibits no tenderness.   Abdominal: Soft. Bowel sounds are normal. He exhibits no distension. There is no tenderness. There is no rebound.   Musculoskeletal: He exhibits no edema or deformity.   Neurological: He is alert and oriented to person, place, and time. He has normal strength. No sensory deficit.   Skin: No rash noted. No erythema.   Psychiatric: He has a normal mood and affect. His behavior is normal.       No components found for: CBC  No results found for: CMP  No components found for: LIPID  No results found for: BMP      ECG 12 Lead  Date/Time: 11/16/2017 10:20 AM  Performed by: CARTER COOPER  Authorized by: KENNETH, " CARTER BALBUENA   Comparison: compared with previous ECG from 11/3/2017  Similar to previous ECG  Rhythm: sinus rhythm  Rate: normal  QRS axis: normal  Clinical impression: abnormal ECG  Comments: Inferior infarction.  T-wave abnormalities inferior leads.            10/25/17  · Left ventricular systolic function is low normal. Calculated EF = 52.8%. Estimated EF was in agreement with the calculated EF. Normal left ventricular cavity size noted. Left ventricular diastolic function is normal. The endocardium is very poorly visualized. The inferior wall appears hypokinetic.    10/25/17  Conclusions:   1. Left main: Normal  2. LAD: Mild luminal irregularities midsegment  3. LCX: 20% mid vessel stenosis  4. RCA: Diffuse 99% mid vessel stenosis with large thrombus burden.  BARON 1 flow distally.  5.  Normal left ventricular size and systolic function.  Basal to mid inferior wall dyskinesis.  6.  Successful PCI of the mid RCA with a 3.5 x 38 mm Xience Alpine drug-eluting stent, postdilated to high pressure with a 3.75 mm noncompliant balloon    DISCUSSION/SUMMARY  A very pleasant 67-year-old gentleman with a medical history of coronary artery disease and inferior ST elevation MI in 10/2017, drug-eluting stent placement to the mid-RCA, preserved ejection fraction, essential hypertension, hyperlipidemia, who presents back for followup. His only real complaint at this point in time is left-sided calf pain with ambulation. This is mild in intensity. His pulses are normal on examination and he has no significant erythema or edema consistent with a DVT either.      1.  Coronary artery disease: Prior inferior ST elevation MI. Drug-eluting stent placement to the mid-RCA.   - Continue dual antiplatelet therapy for at least 1 year.   - Continue atorvastatin at the current dose. He will need repeat lab work in about 6 months.  - Continue metoprolol tartrate at current dose.   2.  Essential hypertension: Not at goal.  - Previously he had  been on multiple antihypertensive therapies before weaned off. I will start him back on Norvasc 5 mg p.o. daily in addition to his current medical regimen.    3.  Hyperlipidemia: As above. Continue atorvastatin at current dose.  4.  Left calf pain with walking. Unlikely to be secondary to vascular disease with his exam. I will go ahead and get an LORENA scheduled for him.     I will see him back in 6 months.

## 2017-11-20 ENCOUNTER — TELEPHONE (OUTPATIENT)
Dept: CARDIOLOGY | Facility: CLINIC | Age: 67
End: 2017-11-20

## 2017-11-20 NOTE — TELEPHONE ENCOUNTER
The patient called and would like to talk to you about switching from Brilinta to something else. The patient states that the medication is to expensive.

## 2017-11-29 ENCOUNTER — HOSPITAL ENCOUNTER (OUTPATIENT)
Dept: CARDIOLOGY | Facility: HOSPITAL | Age: 67
Discharge: HOME | End: 2017-11-29
Attending: INTERNAL MEDICINE | Admitting: INTERNAL MEDICINE

## 2017-11-29 DIAGNOSIS — I73.9 CLAUDICATION (HCC): ICD-10-CM

## 2017-11-29 DIAGNOSIS — Z95.5 HISTORY OF CORONARY ARTERY STENT PLACEMENT: ICD-10-CM

## 2017-11-29 LAB
BH CV LOWER ARTERIAL LEFT ABI RATIO: 1.09
BH CV LOWER ARTERIAL LEFT HIGH THIGH SYS MAX: 173 MMHG
BH CV LOWER ARTERIAL LEFT POPLITEAL SYS MAX: 164 MMHG
BH CV LOWER ARTERIAL LEFT POST TIBIAL SYS MAX: 164 MMHG
BH CV LOWER ARTERIAL RIGHT ABI RATIO: 1.01
BH CV LOWER ARTERIAL RIGHT HIGH THIGH SYS MAX: 164 MMHG
BH CV LOWER ARTERIAL RIGHT POPLITEAL SYS MAX: 154 MMHG
BH CV LOWER ARTERIAL RIGHT POST TIBIAL SYS MAX: 152 MMHG
UPPER ARTERIAL LEFT ARM BRACHIAL SYS MAX: 151 MMHG
UPPER ARTERIAL RIGHT ARM BRACHIAL SYS MAX: 130 MMHG

## 2017-11-29 PROCEDURE — 93923 UPR/LXTR ART STDY 3+ LVLS: CPT | Performed by: INTERNAL MEDICINE

## 2017-11-29 PROCEDURE — 93923 UPR/LXTR ART STDY 3+ LVLS: CPT

## 2017-11-29 RX ORDER — CLOPIDOGREL BISULFATE 75 MG/1
75 TABLET ORAL DAILY
Qty: 30 TABLET | Refills: 11 | Status: SHIPPED | OUTPATIENT
Start: 2017-11-29 | End: 2018-11-09 | Stop reason: SDUPTHER

## 2017-11-29 RX ORDER — CLOPIDOGREL BISULFATE 75 MG/1
75 TABLET ORAL DAILY
Qty: 30 TABLET | Refills: 11 | Status: SHIPPED | OUTPATIENT
Start: 2017-11-29 | End: 2017-11-29 | Stop reason: SDUPTHER

## 2017-12-02 ENCOUNTER — DOCUMENTATION (OUTPATIENT)
Dept: CARDIOLOGY | Facility: CLINIC | Age: 67
End: 2017-12-02

## 2017-12-13 ENCOUNTER — TELEPHONE (OUTPATIENT)
Dept: CARDIOLOGY | Facility: CLINIC | Age: 67
End: 2017-12-13

## 2017-12-13 NOTE — TELEPHONE ENCOUNTER
Pt called and said that he has been swelling in his legs and hands lately. He denies soa, or weight gain. His vitals are 120's/60's HR 60's, since his appt on 11/16/17. He wants to know if he should go back on a water pill. He was on furosemide 40 mg qd prior to his hospital visit. He can be reached at #[phone].    Thanks,  Adina

## 2017-12-14 RX ORDER — FUROSEMIDE 40 MG/1
40 TABLET ORAL DAILY
Qty: 90 TABLET | Refills: 1 | Status: SHIPPED | OUTPATIENT
Start: 2017-12-14

## 2018-01-03 ENCOUNTER — TELEPHONE (OUTPATIENT)
Dept: CARDIOLOGY | Facility: CLINIC | Age: 68
End: 2018-01-03

## 2018-01-03 NOTE — TELEPHONE ENCOUNTER
Pt called. He said that he has been off his Lipitor for 4 weeks as told. His joint pain has gone and he would like to know if he can go back on the pravastatin he was taking before. He can be reached at #[phone]. Please advise.    Thanks,  Adina

## 2018-01-04 RX ORDER — NITROGLYCERIN 0.4 MG/1
TABLET SUBLINGUAL
Refills: 5 | COMMUNITY
Start: 2017-11-28 | End: 2019-09-06 | Stop reason: SDUPTHER

## 2018-01-04 RX ORDER — PRAVASTATIN SODIUM 40 MG
40 TABLET ORAL DAILY
Qty: 90 TABLET | Refills: 1 | Status: SHIPPED | OUTPATIENT
Start: 2018-01-04 | End: 2018-06-25 | Stop reason: SDUPTHER

## 2018-01-12 ENCOUNTER — DOCUMENTATION (OUTPATIENT)
Dept: CARDIOLOGY | Facility: CLINIC | Age: 68
End: 2018-01-12

## 2018-05-31 ENCOUNTER — OFFICE VISIT (OUTPATIENT)
Dept: CARDIOLOGY | Facility: CLINIC | Age: 68
End: 2018-05-31

## 2018-05-31 VITALS
BODY MASS INDEX: 34.58 KG/M2 | WEIGHT: 247 LBS | DIASTOLIC BLOOD PRESSURE: 102 MMHG | HEIGHT: 71 IN | HEART RATE: 58 BPM | SYSTOLIC BLOOD PRESSURE: 152 MMHG

## 2018-05-31 DIAGNOSIS — Z95.5 HISTORY OF CORONARY ARTERY STENT PLACEMENT: ICD-10-CM

## 2018-05-31 DIAGNOSIS — E78.2 MIXED HYPERLIPIDEMIA: ICD-10-CM

## 2018-05-31 DIAGNOSIS — I25.10 CORONARY ARTERY DISEASE INVOLVING NATIVE CORONARY ARTERY OF NATIVE HEART WITHOUT ANGINA PECTORIS: Primary | ICD-10-CM

## 2018-05-31 PROCEDURE — 99214 OFFICE O/P EST MOD 30 MIN: CPT | Performed by: INTERNAL MEDICINE

## 2018-05-31 PROCEDURE — 93000 ELECTROCARDIOGRAM COMPLETE: CPT | Performed by: INTERNAL MEDICINE

## 2018-05-31 NOTE — PROGRESS NOTES
Gabriele Connolly  1950  Date of Office Visit: 5/31/18  Encounter Provider: Contreras Guillory MD  Place of Service: [facility] CARDIOLOGY      CHIEF COMPLAINT:  Coronary artery disease with prior inferior STEMI and PCI  Essential hypertension    HISTORY OF PRESENT ILLNESS:  Dr. Nunn,  I had the pleasure of seeing Mr. Connolly back in follow-up.  He is a patient with a past medical history significant for CVA, hypertension, and tobacco abuse.  On 10/25/2017 he presented to the emergency room with complaints of severe chest pain and was found to be having a inferior STEMI.  Cardiac catheterization revealed a normal left main, mild luminal irregularities in the mid LAD, 20% mid circumflex with normal OM1 and OM 2, and a 99% mid RCA stenosis with an ulcerated plaque and large thrombus burden.  He underwent successful thrombectomy and drug-eluting stent placement to the RCA lesion.  An echocardiogram performed after his procedure showed normal LV function with an EF of 53% and hypokinesis of the inferior wall.  There were no valvular abnormalities.  He was discharged home on 10/27/2017 in stable condition.     Since our last visit, his blood pressure regimen has been altered.  He is back on amlodipine at 5 mg daily and valsartan.  He continues to have poorly controlled blood pressure at this point in time.  His blood pressure is 156/102.  He denies any chest pain or dyspnea on exertion.  He has bilateral lower extremity edema that is 1+ and per his report, unchanged.  He has no orthopnea or PND.        Review of Systems   Constitution: Negative for fever, weakness and malaise/fatigue.   HENT: Negative for nosebleeds and sore throat.    Eyes: Negative for blurred vision and double vision.   Cardiovascular: Negative for chest pain, claudication, palpitations and syncope.   Respiratory: Negative for cough, shortness of breath and snoring.    Endocrine: Negative for cold intolerance,  "heat intolerance and polydipsia.   Skin: Negative for itching, poor wound healing and rash.   Musculoskeletal: Negative for joint pain, joint swelling, muscle weakness and myalgias.   Gastrointestinal: Negative for abdominal pain, melena, nausea and vomiting.   Neurological: Negative for light-headedness, loss of balance, seizures and vertigo.   Psychiatric/Behavioral: Negative for altered mental status and depression.       Past Medical History:   Diagnosis Date   • Arthritis    • ASCVD (arteriosclerotic cardiovascular disease)    • Cerebral infarct    • Coronary artery disease    • Gout    • Hypertension    • Pneumonia    • ST elevation myocardial infarction (STEMI) 10/25/2017   • Stroke    • TIA (transient ischemic attack)        The following portions of the patient's history were reviewed and updated as appropriate: Social history , Family history and Surgical history     Current Outpatient Prescriptions on File Prior to Visit   Medication Sig Dispense Refill   • allopurinol (ZYLOPRIM) 100 MG tablet Take 200 mg by mouth Daily.     • aspirin 81 MG EC tablet Take 81 mg by mouth Daily.     • clopidogrel (PLAVIX) 75 MG tablet Take 1 tablet by mouth Daily. 30 tablet 11   • Coenzyme Q10 (COQ10) 100 MG capsule Take 100 mg by mouth Daily.     • folic acid (FOLVITE) 1 MG tablet Take 1 mg by mouth Daily.     • furosemide (LASIX) 40 MG tablet Take 1 tablet by mouth Daily. 90 tablet 1   • glucosamine-chondroitin 500-400 MG capsule capsule Take 1 capsule by mouth 2 (Two) Times a Day With Meals.     • methotrexate 2.5 MG tablet Take 10 mg by mouth 1 (One) Time Per Week. Patient takes four 2.5 mg tablets once a week on mondays per patient and patient's wife. Written list of medications states \"2.5 mg four per week\"     • metoprolol tartrate (LOPRESSOR) 25 MG tablet TAKE 1 TABLET BY MOUTH EVERY 12 HOURS 60 tablet 0   • nitroglycerin (NITROSTAT) 0.4 MG SL tablet TAKE ONE TO THREE TABLETS OVER 10 MINUTES AS NEED FOR CHEST PAIN  " "5   • pravastatin (PRAVACHOL) 40 MG tablet Take 1 tablet by mouth Daily. 90 tablet 1   • predniSONE (DELTASONE) 1 MG tablet Take 1 mg by mouth 3 (Three) Times a Day.       No current facility-administered medications on file prior to visit.        No Known Allergies    Vitals:    05/31/18 1103   BP: (!) 152/102   BP Location: Right arm   Patient Position: Sitting   Pulse: 58   Weight: 112 kg (247 lb)   Height: 180.3 cm (71\")     Physical Exam   Constitutional: He is oriented to person, place, and time. He appears well-developed and well-nourished.   HENT:   Head: Normocephalic and atraumatic.   Eyes: Conjunctivae and EOM are normal. No scleral icterus.   Neck: Normal range of motion. Neck supple. Normal carotid pulses, no hepatojugular reflux and no JVD present. Carotid bruit is not present. No tracheal deviation present. No thyromegaly present.   Cardiovascular: Normal rate and regular rhythm.  Exam reveals no gallop and no friction rub.    No murmur heard.  Pulses:       Carotid pulses are 2+ on the right side, and 2+ on the left side.       Radial pulses are 2+ on the right side, and 2+ on the left side.        Femoral pulses are 2+ on the right side, and 2+ on the left side.       Dorsalis pedis pulses are 2+ on the right side, and 2+ on the left side.        Posterior tibial pulses are 2+ on the right side, and 2+ on the left side.   Pulmonary/Chest: Breath sounds normal. No respiratory distress. He has no decreased breath sounds. He has no wheezes. He has no rhonchi. He has no rales. He exhibits no tenderness.   Abdominal: Soft. Bowel sounds are normal. He exhibits no distension. There is no tenderness. There is no rebound.   Musculoskeletal: He exhibits no edema or deformity.   Neurological: He is alert and oriented to person, place, and time. He has normal strength. No sensory deficit.   Skin: No rash noted. No erythema.   Psychiatric: He has a normal mood and affect. His behavior is normal.       No " components found for: CBC  No results found for: CMP  No components found for: LIPID  No results found for: BMP      ECG 12 Lead  Date/Time: 5/31/2018 11:39 AM  Performed by: CARTER COOPER  Authorized by: CARTER COOPER   Comparison: compared with previous ECG from 11/16/2017  Similar to previous ECG  Rhythm: sinus rhythm  Rate: normal  QRS axis: normal  Clinical impression: abnormal ECG  Comments: Inferior infarction            10/25/17  · Left ventricular systolic function is low normal. Calculated EF = 52.8%. Estimated EF was in agreement with the calculated EF. Normal left ventricular cavity size noted. Left ventricular diastolic function is normal. The endocardium is very poorly visualized. The inferior wall appears hypokinetic.    10/25/17  Conclusions:   1. Left main: Normal  2. LAD: Mild luminal irregularities midsegment  3. LCX: 20% mid vessel stenosis  4. RCA: Diffuse 99% mid vessel stenosis with large thrombus burden.  BARON 1 flow distally.  5.  Normal left ventricular size and systolic function.  Basal to mid inferior wall dyskinesis.  6.  Successful PCI of the mid RCA with a 3.5 x 38 mm Xience Alpine drug-eluting stent, postdilated to high pressure with a 3.75 mm noncompliant balloon    DISCUSSION/SUMMARY  67-year-old gentleman with a medical history of coronary artery disease and inferior ST elevation MI in 10/2017, drug-eluting stent placement to the mid-RCA, preserved ejection fraction, essential hypertension, hyperlipidemia, who presents back for followup.  He denies any chest pain.  His blood pressure is poorly controlled.     1.  Coronary artery disease: Prior inferior ST elevation MI. Drug-eluting stent placement to the mid-RCA.   - Continue dual antiplatelet therapy until 10/25/18.  Continue aspirin lifelong  - Continue atorvastatin at the current dose.   - Continue metoprolol tartrate at current dose.   2.  Essential hypertension:    -Would be ideal to maximize his valsartan and  see if he really needs the amlodipine.  I think it is likely that he will.  I will double his valsartan to 320 mg daily.  He will hold his amlodipine.  He will call me in 1-2 weeks.  If he needs to restart his  amlodipine then I will.   It would be preferential to have him off of amlodipine with his lower extremity edema  3.  Hyperlipidemia: As above. Continue atorvastatin at current dose.    Coronary Artery Disease  Assessment  • The patient has no angina    Plan  • Lifestyle modifications discussed include adhering to a heart healthy diet, avoidance of tobacco products, medication compliance and regular exercise    Subjective - Objective  • There has been a previous stent procedure using LUISITO on or around 10/25/2017  • Current antiplatelet therapy includes aspirin 81 mg and clopidogrel 75 mg         I will see him back in 6 months.

## 2018-06-25 ENCOUNTER — TELEPHONE (OUTPATIENT)
Dept: CARDIOLOGY | Facility: CLINIC | Age: 68
End: 2018-06-25

## 2018-06-25 RX ORDER — PRAVASTATIN SODIUM 80 MG/1
80 TABLET ORAL DAILY
Qty: 90 TABLET | Refills: 1 | Status: SHIPPED | OUTPATIENT
Start: 2018-06-25 | End: 2018-06-27 | Stop reason: SDUPTHER

## 2018-06-25 NOTE — TELEPHONE ENCOUNTER
----- Message from Contreras Guillory MD sent at 6/25/2018 10:36 AM EDT -----  He needs to see if he can tolerate 80 mg daily.  Please tell him to start taking that.  ----- Message -----  From: Adina Walls MA  Sent: 6/22/2018  12:41 PM  To: Contreras Guillory MD    He is taking pravastatin 40mg qd. Also he said his BP has come back down to 130's/80's  ----- Message -----  From: Contreras Giullory MD  Sent: 6/22/2018  12:17 PM  To: Adina Walls MA    LDL is slightly elevated on current dose of statin. Please confirm what he is taking. He will need to double his dose.

## 2018-06-27 RX ORDER — PRAVASTATIN SODIUM 80 MG/1
80 TABLET ORAL DAILY
Qty: 90 TABLET | Refills: 1 | Status: SHIPPED | OUTPATIENT
Start: 2018-06-27 | End: 2019-02-05 | Stop reason: SDUPTHER

## 2018-07-20 ENCOUNTER — TELEPHONE (OUTPATIENT)
Dept: CARDIOLOGY | Facility: CLINIC | Age: 68
End: 2018-07-20

## 2018-07-20 RX ORDER — TELMISARTAN 80 MG/1
80 TABLET ORAL DAILY
Qty: 30 TABLET | Refills: 6 | Status: SHIPPED | OUTPATIENT
Start: 2018-07-20 | End: 2018-07-25 | Stop reason: SDUPTHER

## 2018-07-20 NOTE — TELEPHONE ENCOUNTER
Pt called about an alternative medication for his Valsartan. He is currently taking Valsartan 160 mg BID. What should I send in...Concepción

## 2018-07-25 RX ORDER — TELMISARTAN 80 MG/1
80 TABLET ORAL DAILY
Qty: 30 TABLET | Refills: 6 | Status: SHIPPED | OUTPATIENT
Start: 2018-07-25 | End: 2019-09-06 | Stop reason: ALTCHOICE

## 2018-08-01 ENCOUNTER — TELEPHONE (OUTPATIENT)
Dept: CARDIOLOGY | Facility: CLINIC | Age: 68
End: 2018-08-01

## 2018-08-01 NOTE — TELEPHONE ENCOUNTER
That Is fine.  I would like him to stay on his aspirin.  He is stopping his antiplatelets before one year of completed therapy.  If we are going to do that the only way I would agree would be if he continues 81 mg of aspirin

## 2018-08-01 NOTE — TELEPHONE ENCOUNTER
Pt called. He is having left shoulder replacement on 8/23/18 with Dr. Yossi Ochoa in [place]. He would like to know if he is clear from a cardiac standpoint and if he can hold his Aspirin and Plavix for 5-7 days.  We saw him last on 5/31/18. Please advise.    Thanks,  Adina

## 2018-09-19 ENCOUNTER — TELEPHONE (OUTPATIENT)
Dept: CARDIOLOGY | Facility: CLINIC | Age: 68
End: 2018-09-19

## 2018-09-19 NOTE — TELEPHONE ENCOUNTER
Komal with Dr. Vasiliy Nunn's office called. They would like to know if he is clear to have a colonoscopy. It is not scheduled yet. He was anemic on his recent labs and has had a positive Hemeoccult test. Please advise.    Thanks,  Adina

## 2018-09-25 NOTE — TELEPHONE ENCOUNTER
See below. I got a call back wanting to know if he can stop his Aspirin prior to his procedure. And how long. Please advise.    Thanks,    Adina

## 2018-11-05 ENCOUNTER — TELEPHONE (OUTPATIENT)
Dept: CARDIOLOGY | Facility: CLINIC | Age: 68
End: 2018-11-05

## 2018-11-05 RX ORDER — AMLODIPINE BESYLATE 5 MG/1
5 TABLET ORAL DAILY
Qty: 30 TABLET | Refills: 11 | Status: SHIPPED | OUTPATIENT
Start: 2018-11-05 | End: 2018-11-06 | Stop reason: SDUPTHER

## 2018-11-05 NOTE — TELEPHONE ENCOUNTER
Patient calling stating his BP has been up the last two times he has went in for scopes he had done. He states he went back for a checkup today and his OVIDIO was 190/107  States he takes Micardis 80mg once daily and metoprolol Tart 25mg 1 po bid  This is all he takes  [phone]

## 2018-11-06 RX ORDER — AMLODIPINE BESYLATE 5 MG/1
5 TABLET ORAL DAILY
Qty: 30 TABLET | Refills: 11 | Status: SHIPPED | OUTPATIENT
Start: 2018-11-06 | End: 2019-09-06

## 2018-11-09 RX ORDER — CLOPIDOGREL BISULFATE 75 MG/1
75 TABLET ORAL DAILY
Qty: 90 TABLET | Refills: 3 | Status: SHIPPED | OUTPATIENT
Start: 2018-11-09 | End: 2019-09-06

## 2018-12-07 ENCOUNTER — OFFICE VISIT (OUTPATIENT)
Dept: CARDIOLOGY | Facility: CLINIC | Age: 68
End: 2018-12-07

## 2018-12-07 VITALS
WEIGHT: 249 LBS | SYSTOLIC BLOOD PRESSURE: 128 MMHG | DIASTOLIC BLOOD PRESSURE: 72 MMHG | BODY MASS INDEX: 34.86 KG/M2 | HEIGHT: 71 IN | HEART RATE: 65 BPM

## 2018-12-07 DIAGNOSIS — E78.2 MIXED HYPERLIPIDEMIA: ICD-10-CM

## 2018-12-07 DIAGNOSIS — I25.10 CORONARY ARTERY DISEASE INVOLVING NATIVE CORONARY ARTERY OF NATIVE HEART WITHOUT ANGINA PECTORIS: Primary | ICD-10-CM

## 2018-12-07 DIAGNOSIS — Z95.5 HISTORY OF CORONARY ARTERY STENT PLACEMENT: ICD-10-CM

## 2018-12-07 PROCEDURE — 99214 OFFICE O/P EST MOD 30 MIN: CPT | Performed by: INTERNAL MEDICINE

## 2018-12-07 PROCEDURE — 93000 ELECTROCARDIOGRAM COMPLETE: CPT | Performed by: INTERNAL MEDICINE

## 2018-12-07 RX ORDER — FERROUS SULFATE 325(65) MG
1 TABLET ORAL 2 TIMES DAILY
Refills: 11 | COMMUNITY
Start: 2018-11-15 | End: 2021-01-01

## 2018-12-07 NOTE — PROGRESS NOTES
Gabriele Connolly  1950  Date of Office Visit: 12/7/18  Encounter Provider: Contreras Guillory MD  Place of Service: [facility] CARDIOLOGY      CHIEF COMPLAINT:  Coronary artery disease with prior inferior STEMI and PCI  Essential hypertension    HISTORY OF PRESENT ILLNESS:  Dr. Nunn,  I had the pleasure of seeing Mr. Connolly back in follow-up.  He is a patient with a past medical history significant for CVA, hypertension, and tobacco abuse.  On 10/25/2017 he presented to the emergency room with complaints of severe chest pain and was found to be having a inferior STEMI.  Cardiac catheterization revealed a normal left main, mild luminal irregularities in the mid LAD, 20% mid circumflex with normal OM1 and OM 2, and a 99% mid RCA stenosis with an ulcerated plaque and large thrombus burden.  He underwent successful thrombectomy and drug-eluting stent placement to the RCA lesion.  An echocardiogram performed after his procedure showed normal LV function with an EF of 53% and hypokinesis of the inferior wall.  There were no valvular abnormalities.  He was discharged home on 10/27/2017 in stable condition.     Since our last visit, he has been doing very well.  He denies any chest pain or dyspnea on exertion.  He continues to have mild bilateral lower extremity edema that worsens throughout the day and improves with elevation.  He has no orthopnea or PND.      He has been undergoing GI evaluation and has seen a couple of different GI doctors.  He is now on a proton pump inhibitor with plans for repeat endoscopy in the near future.            Review of Systems   Constitution: Negative for fever, weakness and malaise/fatigue.   HENT: Negative for nosebleeds and sore throat.    Eyes: Negative for blurred vision and double vision.   Cardiovascular: Negative for chest pain, claudication, palpitations and syncope.   Respiratory: Negative for cough, shortness of breath and snoring.   "  Endocrine: Negative for cold intolerance, heat intolerance and polydipsia.   Skin: Negative for itching, poor wound healing and rash.   Musculoskeletal: Negative for joint pain, joint swelling, muscle weakness and myalgias.   Gastrointestinal: Negative for abdominal pain, melena, nausea and vomiting.   Neurological: Negative for light-headedness, loss of balance, seizures and vertigo.   Psychiatric/Behavioral: Negative for altered mental status and depression.       Past Medical History:   Diagnosis Date   • Arthritis    • ASCVD (arteriosclerotic cardiovascular disease)    • Cerebral infarct (CMS/[facility])    • Coronary artery disease    • Gout    • Hypertension    • Pneumonia    • ST elevation myocardial infarction (STEMI) (CMS/[facility]) 10/25/2017   • Stroke (CMS/[facility])    • TIA (transient ischemic attack)        The following portions of the patient's history were reviewed and updated as appropriate: Social history , Family history and Surgical history     Current Outpatient Medications on File Prior to Visit   Medication Sig Dispense Refill   • allopurinol (ZYLOPRIM) 100 MG tablet Take 200 mg by mouth Daily.     • amLODIPine (NORVASC) 5 MG tablet Take 1 tablet by mouth Daily. 30 tablet 11   • aspirin 81 MG EC tablet Take 81 mg by mouth Daily.     • clopidogrel (PLAVIX) 75 MG tablet TAKE 1 TABLET BY MOUTH DAILY 90 tablet 3   • Coenzyme Q10 (COQ10) 100 MG capsule Take 100 mg by mouth Daily.     • FEROSUL 325 (65 Fe) MG tablet Take 1 tablet by mouth 2 (Two) Times a Day.  11   • folic acid (FOLVITE) 1 MG tablet Take 1 mg by mouth Daily.     • furosemide (LASIX) 40 MG tablet Take 1 tablet by mouth Daily. 90 tablet 1   • glucosamine-chondroitin 500-400 MG capsule capsule Take 1 capsule by mouth 2 (Two) Times a Day With Meals.     • methotrexate 2.5 MG tablet Take 10 mg by mouth 1 (One) Time Per Week. Patient takes four 2.5 mg tablets once a week on mondays per patient and patient's wife. Written list of medications states \"2.5 " "mg four per week\"     • metoprolol tartrate (LOPRESSOR) 25 MG tablet TAKE 1 TABLET BY MOUTH EVERY 12 HOURS 60 tablet 0   • nitroglycerin (NITROSTAT) 0.4 MG SL tablet TAKE ONE TO THREE TABLETS OVER 10 MINUTES AS NEED FOR CHEST PAIN  5   • pravastatin (PRAVACHOL) 80 MG tablet Take 1 tablet by mouth Daily. 90 tablet 1   • predniSONE (DELTASONE) 1 MG tablet Take 1 mg by mouth 3 (Three) Times a Day.     • telmisartan (MICARDIS) 80 MG tablet Take 1 tablet by mouth Daily. 30 tablet 6     No current facility-administered medications on file prior to visit.        No Known Allergies    Vitals:    12/07/18 0939   BP: 128/72   Pulse: 65   Weight: 113 kg (249 lb)   Height: 180.3 cm (71\")     Physical Exam   Constitutional: He is oriented to person, place, and time. He appears well-developed and well-nourished.   HENT:   Head: Normocephalic and atraumatic.   Eyes: Conjunctivae and EOM are normal. No scleral icterus.   Neck: Normal range of motion. Neck supple. Normal carotid pulses, no hepatojugular reflux and no JVD present. Carotid bruit is not present. No tracheal deviation present. No thyromegaly present.   Cardiovascular: Normal rate and regular rhythm. Exam reveals no gallop and no friction rub.   No murmur heard.  Pulses:       Carotid pulses are 2+ on the right side, and 2+ on the left side.       Radial pulses are 2+ on the right side, and 2+ on the left side.        Femoral pulses are 2+ on the right side, and 2+ on the left side.       Dorsalis pedis pulses are 2+ on the right side, and 2+ on the left side.        Posterior tibial pulses are 2+ on the right side, and 2+ on the left side.   Pulmonary/Chest: Breath sounds normal. No respiratory distress. He has no decreased breath sounds. He has no wheezes. He has no rhonchi. He has no rales. He exhibits no tenderness.   Abdominal: Soft. Bowel sounds are normal. He exhibits no distension. There is no tenderness. There is no rebound.   Musculoskeletal: He exhibits no " edema or deformity.   Neurological: He is alert and oriented to person, place, and time. He has normal strength. No sensory deficit.   Skin: No rash noted. No erythema.   Psychiatric: He has a normal mood and affect. His behavior is normal.       No components found for: CBC  No results found for: CMP  No components found for: LIPID  No results found for: BMP      ECG 12 Lead  Date/Time: 12/7/2018 10:05 AM  Performed by: Contreras Guillory MD  Authorized by: Contreras Guillory MD   Comparison: compared with previous ECG from 5/31/2018  Similar to previous ECG  Rhythm: sinus rhythm  Rate: normal  QRS axis: left  Clinical impression: abnormal ECG  Comments: Inferior infarct            10/25/17  · Left ventricular systolic function is low normal. Calculated EF = 52.8%. Estimated EF was in agreement with the calculated EF. Normal left ventricular cavity size noted. Left ventricular diastolic function is normal. The endocardium is very poorly visualized. The inferior wall appears hypokinetic.    10/25/17  Conclusions:   1. Left main: Normal  2. LAD: Mild luminal irregularities midsegment  3. LCX: 20% mid vessel stenosis  4. RCA: Diffuse 99% mid vessel stenosis with large thrombus burden.  BARON 1 flow distally.  5.  Normal left ventricular size and systolic function.  Basal to mid inferior wall dyskinesis.  6.  Successful PCI of the mid RCA with a 3.5 x 38 mm Xience Alpine drug-eluting stent, postdilated to high pressure with a 3.75 mm noncompliant balloon    DISCUSSION/SUMMARY  67-year-old gentleman with a medical history of coronary artery disease and inferior ST elevation MI in 10/2017, drug-eluting stent placement to the mid-RCA, preserved ejection fraction, essential hypertension, hyperlipidemia, who presents back for followup.  He denies any chest pain.  His blood pressure is poorly controlled.     1.  Coronary artery disease: Prior inferior ST elevation MI. Drug-eluting stent placement to the mid-RCA.   -   Continue aspirin lifelong.  Stop Plavix.  - Continue pravastatin at the current dose.   - Continue metoprolol tartrate at current dose.   2.  Essential hypertension:  Well controlled.  No alteration in therapy at this time  3.  Hyperlipidemia: As above. Continue pravastatin.  Last LDL was slightly elevated at 85, however I think this is reasonable.    Coronary Artery Disease  Assessment  • The patient has no angina    Plan  • Lifestyle modifications discussed include adhering to a heart healthy diet, avoidance of tobacco products, medication compliance and regular exercise    Subjective - Objective  • There has been a previous stent procedure using LUISITO on or around 10/25/2017  • Current antiplatelet therapy includes aspirin 81 mg and clopidogrel 75 mg

## 2019-02-05 RX ORDER — PRAVASTATIN SODIUM 80 MG/1
80 TABLET ORAL DAILY
Qty: 90 TABLET | Refills: 0 | Status: SHIPPED | OUTPATIENT
Start: 2019-02-05 | End: 2019-06-07 | Stop reason: SDUPTHER

## 2019-02-13 ENCOUNTER — OFFICE VISIT CONVERTED (OUTPATIENT)
Dept: ONCOLOGY | Facility: HOSPITAL | Age: 69
End: 2019-02-13
Attending: INTERNAL MEDICINE

## 2019-02-13 ENCOUNTER — HOSPITAL ENCOUNTER (OUTPATIENT)
Dept: ONCOLOGY | Facility: HOSPITAL | Age: 69
Discharge: HOME | End: 2019-02-13
Attending: INTERNAL MEDICINE

## 2019-02-13 LAB
ALBUMIN SERPL-MCNC: 3.7 G/DL (ref 3.5–5)
ALBUMIN/GLOB SERPL: 1.1 {RATIO} (ref 1.4–2.6)
ALP SERPL-CCNC: 89 U/L (ref 56–155)
ALT SERPL-CCNC: 12 U/L (ref 10–40)
ANION GAP SERPL CALC-SCNC: 17 MMOL/L (ref 8–19)
AST SERPL-CCNC: 11 U/L (ref 15–50)
BASOPHILS # BLD AUTO: 0.02 10*3/UL (ref 0–0.2)
BASOPHILS NFR BLD AUTO: 0.1 % (ref 0–3)
BILIRUB SERPL-MCNC: 0.34 MG/DL (ref 0.2–1.3)
BUN SERPL-MCNC: 14 MG/DL (ref 5–25)
BUN/CREAT SERPL: 18 {RATIO} (ref 6–20)
CALCIUM SERPL-MCNC: 9.7 MG/DL (ref 8.7–10.4)
CHLORIDE SERPL-SCNC: 103 MMOL/L (ref 99–111)
CONV CO2: 27 MMOL/L (ref 22–32)
CONV TOTAL PROTEIN: 7.1 G/DL (ref 6.3–8.2)
CREAT UR-MCNC: 0.79 MG/DL (ref 0.7–1.2)
EOSINOPHIL # BLD AUTO: 0.38 10*3/UL (ref 0–0.7)
EOSINOPHIL # BLD AUTO: 2.52 % (ref 0–7)
ERYTHROCYTE [DISTWIDTH] IN BLOOD BY AUTOMATED COUNT: 20.3 % (ref 11.5–14.5)
GFR SERPLBLD BASED ON 1.73 SQ M-ARVRAT: >60 ML/MIN/{1.73_M2}
GLOBULIN UR ELPH-MCNC: 3.4 G/DL (ref 2–3.5)
GLUCOSE SERPL-MCNC: 91 MG/DL (ref 70–99)
HBA1C MFR BLD: 14 G/DL (ref 14–18)
HCT VFR BLD AUTO: 43.1 % (ref 42–52)
LYMPHOCYTES # BLD AUTO: 2.04 10*3/UL (ref 1–5)
MCH RBC QN AUTO: 28.1 PG (ref 27–31)
MCHC RBC AUTO-ENTMCNC: 32.5 G/DL (ref 33–37)
MCV RBC AUTO: 86.5 FL (ref 80–96)
MONOCYTES # BLD AUTO: 1.05 10*3/UL (ref 0.2–1.2)
MONOCYTES NFR BLD AUTO: 6.99 % (ref 3–10)
NEUTROPHILS # BLD AUTO: 11.5 10*3/UL (ref 2–8)
NEUTROPHILS NFR BLD AUTO: 76.8 % (ref 30–85)
OSMOLALITY SERPL CALC.SUM OF ELEC: 296 MOSM/KG (ref 273–304)
PLATELET # BLD AUTO: 369 10*3/UL (ref 130–400)
PMV BLD AUTO: 8.9 FL (ref 7.4–10.4)
POTASSIUM SERPL-SCNC: 3.7 MMOL/L (ref 3.5–5.3)
RBC # BLD AUTO: 4.98 10*6/UL (ref 4.7–6.1)
SODIUM SERPL-SCNC: 143 MMOL/L (ref 135–147)
VARIANT LYMPHS NFR BLD MANUAL: 13.6 % (ref 20–45)
WBC # BLD AUTO: 15 10*3/UL (ref 4.8–10.8)

## 2019-02-15 ENCOUNTER — HOSPITAL ENCOUNTER (OUTPATIENT)
Dept: RADIATION ONCOLOGY | Facility: HOSPITAL | Age: 69
Setting detail: RECURRING SERIES
Discharge: HOME | End: 2019-02-28
Attending: RADIOLOGY

## 2019-02-18 ENCOUNTER — HOSPITAL ENCOUNTER (OUTPATIENT)
Dept: CT IMAGING | Facility: HOSPITAL | Age: 69
Discharge: HOME | End: 2019-02-18
Attending: RADIOLOGY

## 2019-02-22 ENCOUNTER — HOSPITAL ENCOUNTER (OUTPATIENT)
Dept: MRI IMAGING | Facility: HOSPITAL | Age: 69
Discharge: HOME | End: 2019-02-22
Attending: RADIOLOGY

## 2019-02-26 ENCOUNTER — OFFICE VISIT CONVERTED (OUTPATIENT)
Dept: ONCOLOGY | Facility: HOSPITAL | Age: 69
End: 2019-02-26
Attending: INTERNAL MEDICINE

## 2019-02-26 ENCOUNTER — HOSPITAL ENCOUNTER (OUTPATIENT)
Dept: OTHER | Facility: HOSPITAL | Age: 69
Setting detail: RECURRING SERIES
Discharge: HOME | End: 2019-02-28
Attending: INTERNAL MEDICINE

## 2019-02-26 ENCOUNTER — HOSPITAL ENCOUNTER (OUTPATIENT)
Dept: ONCOLOGY | Facility: HOSPITAL | Age: 69
Discharge: HOME | End: 2019-02-26
Attending: INTERNAL MEDICINE

## 2019-03-06 ENCOUNTER — CONVERSION ENCOUNTER (OUTPATIENT)
Dept: ONCOLOGY | Facility: HOSPITAL | Age: 69
End: 2019-03-06

## 2019-03-06 ENCOUNTER — HOSPITAL ENCOUNTER (OUTPATIENT)
Dept: OTHER | Facility: HOSPITAL | Age: 69
Setting detail: RECURRING SERIES
Discharge: HOME | End: 2019-03-31
Attending: INTERNAL MEDICINE

## 2019-03-06 LAB
ALBUMIN SERPL-MCNC: 3.5 G/DL (ref 3.5–5)
ALBUMIN/GLOB SERPL: 1 {RATIO} (ref 1.4–2.6)
ALP SERPL-CCNC: 102 U/L (ref 56–155)
ALT SERPL-CCNC: 17 U/L (ref 10–40)
ANION GAP SERPL CALC-SCNC: 16 MMOL/L (ref 8–19)
AST SERPL-CCNC: 15 U/L (ref 15–50)
BASOPHILS # BLD AUTO: 0.05 10*3/UL (ref 0–0.2)
BASOPHILS NFR BLD AUTO: 0.4 % (ref 0–3)
BILIRUB SERPL-MCNC: 0.44 MG/DL (ref 0.2–1.3)
BUN SERPL-MCNC: 14 MG/DL (ref 5–25)
BUN/CREAT SERPL: 17 {RATIO} (ref 6–20)
CALCIUM SERPL-MCNC: 9.7 MG/DL (ref 8.7–10.4)
CHLORIDE SERPL-SCNC: 100 MMOL/L (ref 99–111)
CONV ABS IMM GRAN: 0.06 10*3/UL (ref 0–0.2)
CONV CO2: 28 MMOL/L (ref 22–32)
CONV IMMATURE GRAN: 0.5 % (ref 0–1.8)
CONV TOTAL PROTEIN: 7.1 G/DL (ref 6.3–8.2)
CREAT UR-MCNC: 0.83 MG/DL (ref 0.7–1.2)
DEPRECATED RDW RBC AUTO: 58.1 FL (ref 35.1–43.9)
EOSINOPHIL # BLD AUTO: 0.26 10*3/UL (ref 0–0.7)
EOSINOPHIL # BLD AUTO: 2 % (ref 0–7)
ERYTHROCYTE [DISTWIDTH] IN BLOOD BY AUTOMATED COUNT: 17.7 % (ref 11.6–14.4)
GFR SERPLBLD BASED ON 1.73 SQ M-ARVRAT: >60 ML/MIN/{1.73_M2}
GLOBULIN UR ELPH-MCNC: 3.6 G/DL (ref 2–3.5)
GLUCOSE SERPL-MCNC: 102 MG/DL (ref 70–99)
HBA1C MFR BLD: 13.7 G/DL (ref 14–18)
HCT VFR BLD AUTO: 43.9 % (ref 42–52)
LYMPHOCYTES # BLD AUTO: 1.15 10*3/UL (ref 1–5)
MAGNESIUM SERPL-MCNC: 1.66 MG/DL (ref 1.6–2.3)
MCH RBC QN AUTO: 27.9 PG (ref 27–31)
MCHC RBC AUTO-ENTMCNC: 31.2 G/DL (ref 33–37)
MCV RBC AUTO: 89.4 FL (ref 80–96)
MONOCYTES # BLD AUTO: 0.74 10*3/UL (ref 0.2–1.2)
MONOCYTES NFR BLD AUTO: 5.7 % (ref 3–10)
NEUTROPHILS # BLD AUTO: 10.7 10*3/UL (ref 2–8)
NEUTROPHILS NFR BLD AUTO: 82.5 % (ref 30–85)
NRBC CBCN: 0 % (ref 0–0.7)
OSMOLALITY SERPL CALC.SUM OF ELEC: 291 MOSM/KG (ref 273–304)
PLATELET # BLD AUTO: 324 10*3/UL (ref 130–400)
PMV BLD AUTO: 10.3 FL (ref 9.4–12.4)
POTASSIUM SERPL-SCNC: 3.6 MMOL/L (ref 3.5–5.3)
RBC # BLD AUTO: 4.91 10*6/UL (ref 4.7–6.1)
SODIUM SERPL-SCNC: 140 MMOL/L (ref 135–147)
VARIANT LYMPHS NFR BLD MANUAL: 8.9 % (ref 20–45)
WBC # BLD AUTO: 12.96 10*3/UL (ref 4.8–10.8)

## 2019-03-20 ENCOUNTER — OFFICE VISIT CONVERTED (OUTPATIENT)
Dept: ONCOLOGY | Facility: HOSPITAL | Age: 69
End: 2019-03-20
Attending: INTERNAL MEDICINE

## 2019-03-20 LAB
ALBUMIN SERPL-MCNC: 3.7 G/DL (ref 3.5–5)
ALBUMIN/GLOB SERPL: 1.1 {RATIO} (ref 1.4–2.6)
ALP SERPL-CCNC: 166 U/L (ref 56–155)
ALT SERPL-CCNC: 14 U/L (ref 10–40)
ANION GAP SERPL CALC-SCNC: 14 MMOL/L (ref 8–19)
AST SERPL-CCNC: 16 U/L (ref 15–50)
BASOPHILS # BLD AUTO: 0.04 10*3/UL (ref 0–0.2)
BASOPHILS NFR BLD AUTO: 0.1 % (ref 0–3)
BILIRUB SERPL-MCNC: 0.28 MG/DL (ref 0.2–1.3)
BUN SERPL-MCNC: 16 MG/DL (ref 5–25)
BUN/CREAT SERPL: 17 {RATIO} (ref 6–20)
CALCIUM SERPL-MCNC: 9.4 MG/DL (ref 8.7–10.4)
CHLORIDE SERPL-SCNC: 99 MMOL/L (ref 99–111)
CONV ABS IMM GRAN: 3.93 10*3/UL (ref 0–0.54)
CONV CO2: 29 MMOL/L (ref 22–32)
CONV EOSINOPHILS PERCENT BY MANUAL COUNT: 1.2 % (ref 0–7)
CONV IMMATURE GRAN: 13.5 % (ref 0–0.4)
CONV TOTAL PROTEIN: 7 G/DL (ref 6.3–8.2)
CREAT UR-MCNC: 0.93 MG/DL (ref 0.7–1.2)
EOSINOPHIL # BLD MANUAL: 0.34 10*3/UL (ref 0–0.7)
ERYTHROCYTE [DISTWIDTH] IN BLOOD BY AUTOMATED COUNT: 17.7 % (ref 11.5–14.5)
ERYTHROCYTE [DISTWIDTH] IN BLOOD BY AUTOMATED COUNT: 56.9 FL
GFR SERPLBLD BASED ON 1.73 SQ M-ARVRAT: >60 ML/MIN/{1.73_M2}
GLOBULIN UR ELPH-MCNC: 3.3 G/DL (ref 2–3.5)
GLUCOSE SERPL-MCNC: 95 MG/DL (ref 70–99)
HBA1C MFR BLD: 13.8 G/DL (ref 14–18)
HCT VFR BLD AUTO: 45.4 % (ref 42–52)
LYMPHOCYTES # BLD AUTO: 2.57 10*3/UL (ref 1–5)
LYMPHOCYTES NFR BLD AUTO: 8.8 % (ref 20–45)
MAGNESIUM SERPL-MCNC: 1.66 MG/DL (ref 1.6–2.3)
MCH RBC QN AUTO: 27.2 PG (ref 27–31)
MCHC RBC AUTO-ENTMCNC: 30.4 G/DL (ref 33–37)
MCV RBC AUTO: 89.5 FL (ref 80–96)
MONOCYTES # BLD AUTO: 2.15 10*3/UL (ref 0.2–1.2)
MONOCYTES NFR BLD MANUAL: 7.4 % (ref 3–10)
NEUTROPHILS # BLD AUTO: 20.06 10*3/UL (ref 2–8)
NEUTROPHILS NFR BLD MANUAL: 69 % (ref 30–85)
OSMOLALITY SERPL CALC.SUM OF ELEC: 287 MOSM/KG (ref 273–304)
PLATELET # BLD AUTO: 151 10*3/UL (ref 130–400)
PMV BLD AUTO: 9.7 FL (ref 7.4–10.4)
POTASSIUM SERPL-SCNC: 3.7 MMOL/L (ref 3.5–5.3)
RBC MORPH BLD: 5.07 10*6/UL (ref 4.7–6.1)
SODIUM SERPL-SCNC: 138 MMOL/L (ref 135–147)
WBC # BLD AUTO: 29.09 10*3/UL (ref 4.8–10.8)

## 2019-04-03 ENCOUNTER — HOSPITAL ENCOUNTER (OUTPATIENT)
Dept: OTHER | Facility: HOSPITAL | Age: 69
Setting detail: RECURRING SERIES
Discharge: HOME | End: 2019-04-30
Attending: INTERNAL MEDICINE

## 2019-04-03 ENCOUNTER — OFFICE VISIT CONVERTED (OUTPATIENT)
Dept: ONCOLOGY | Facility: HOSPITAL | Age: 69
End: 2019-04-03
Attending: INTERNAL MEDICINE

## 2019-04-03 LAB
ALBUMIN SERPL-MCNC: 3.7 G/DL (ref 3.5–5)
ALBUMIN/GLOB SERPL: 1.1 {RATIO} (ref 1.4–2.6)
ALP SERPL-CCNC: 235 U/L (ref 56–155)
ALT SERPL-CCNC: 48 U/L (ref 10–40)
ANION GAP SERPL CALC-SCNC: 16 MMOL/L (ref 8–19)
AST SERPL-CCNC: 38 U/L (ref 15–50)
BASOPHILS # BLD AUTO: 0.09 10*3/UL (ref 0–0.2)
BASOPHILS NFR BLD AUTO: 0.4 % (ref 0–3)
BILIRUB SERPL-MCNC: 0.3 MG/DL (ref 0.2–1.3)
BUN SERPL-MCNC: 22 MG/DL (ref 5–25)
BUN/CREAT SERPL: 22 {RATIO} (ref 6–20)
CALCIUM SERPL-MCNC: 9.6 MG/DL (ref 8.7–10.4)
CHLORIDE SERPL-SCNC: 101 MMOL/L (ref 99–111)
CONV ABS IMM GRAN: 1.36 10*3/UL (ref 0–0.54)
CONV CO2: 28 MMOL/L (ref 22–32)
CONV EOSINOPHILS PERCENT BY MANUAL COUNT: 0.9 % (ref 0–7)
CONV IMMATURE GRAN: 6.3 % (ref 0–0.4)
CONV TOTAL PROTEIN: 7.1 G/DL (ref 6.3–8.2)
CREAT UR-MCNC: 1 MG/DL (ref 0.7–1.2)
EOSINOPHIL # BLD MANUAL: 0.2 10*3/UL (ref 0–0.7)
ERYTHROCYTE [DISTWIDTH] IN BLOOD BY AUTOMATED COUNT: 18.2 % (ref 11.5–14.5)
ERYTHROCYTE [DISTWIDTH] IN BLOOD BY AUTOMATED COUNT: 58 FL
GFR SERPLBLD BASED ON 1.73 SQ M-ARVRAT: >60 ML/MIN/{1.73_M2}
GLOBULIN UR ELPH-MCNC: 3.4 G/DL (ref 2–3.5)
GLUCOSE SERPL-MCNC: 105 MG/DL (ref 70–99)
HBA1C MFR BLD: 13.7 G/DL (ref 14–18)
HCT VFR BLD AUTO: 44.6 % (ref 42–52)
LYMPHOCYTES # BLD AUTO: 2.38 10*3/UL (ref 1–5)
LYMPHOCYTES NFR BLD AUTO: 11 % (ref 20–45)
MAGNESIUM SERPL-MCNC: 1.95 MG/DL (ref 1.6–2.3)
MCH RBC QN AUTO: 27.6 PG (ref 27–31)
MCHC RBC AUTO-ENTMCNC: 30.7 G/DL (ref 33–37)
MCV RBC AUTO: 89.9 FL (ref 80–96)
MONOCYTES # BLD AUTO: 1.81 10*3/UL (ref 0.2–1.2)
MONOCYTES NFR BLD MANUAL: 8.4 % (ref 3–10)
NEUTROPHILS # BLD AUTO: 15.72 10*3/UL (ref 2–8)
NEUTROPHILS NFR BLD MANUAL: 73 % (ref 30–85)
OSMOLALITY SERPL CALC.SUM OF ELEC: 296 MOSM/KG (ref 273–304)
PLATELET # BLD AUTO: 216 10*3/UL (ref 130–400)
PMV BLD AUTO: 9.9 FL (ref 7.4–10.4)
POTASSIUM SERPL-SCNC: 4 MMOL/L (ref 3.5–5.3)
RBC MORPH BLD: 4.96 10*6/UL (ref 4.7–6.1)
SODIUM SERPL-SCNC: 141 MMOL/L (ref 135–147)
WBC # BLD AUTO: 21.56 10*3/UL (ref 4.8–10.8)

## 2019-04-17 ENCOUNTER — OFFICE VISIT CONVERTED (OUTPATIENT)
Dept: ONCOLOGY | Facility: HOSPITAL | Age: 69
End: 2019-04-17
Attending: INTERNAL MEDICINE

## 2019-04-17 LAB
ALBUMIN SERPL-MCNC: 3.6 G/DL (ref 3.5–5)
ALBUMIN/GLOB SERPL: 1.1 {RATIO} (ref 1.4–2.6)
ALP SERPL-CCNC: 207 U/L (ref 56–155)
ALT SERPL-CCNC: 35 U/L (ref 10–40)
ANION GAP SERPL CALC-SCNC: 15 MMOL/L (ref 8–19)
AST SERPL-CCNC: 29 U/L (ref 15–50)
BASOPHILS # BLD AUTO: 0.08 10*3/UL (ref 0–0.2)
BASOPHILS NFR BLD AUTO: 0.4 % (ref 0–3)
BILIRUB SERPL-MCNC: 0.35 MG/DL (ref 0.2–1.3)
BUN SERPL-MCNC: 14 MG/DL (ref 5–25)
BUN/CREAT SERPL: 17 {RATIO} (ref 6–20)
CALCIUM SERPL-MCNC: 9.5 MG/DL (ref 8.7–10.4)
CHLORIDE SERPL-SCNC: 102 MMOL/L (ref 99–111)
CONV ABS IMM GRAN: 1.09 10*3/UL (ref 0–0.54)
CONV CO2: 28 MMOL/L (ref 22–32)
CONV EOSINOPHILS PERCENT BY MANUAL COUNT: 1.3 % (ref 0–7)
CONV IMMATURE GRAN: 5.4 % (ref 0–0.4)
CONV TOTAL PROTEIN: 6.9 G/DL (ref 6.3–8.2)
CREAT UR-MCNC: 0.82 MG/DL (ref 0.7–1.2)
EOSINOPHIL # BLD MANUAL: 0.26 10*3/UL (ref 0–0.7)
ERYTHROCYTE [DISTWIDTH] IN BLOOD BY AUTOMATED COUNT: 17.3 % (ref 11.5–14.5)
ERYTHROCYTE [DISTWIDTH] IN BLOOD BY AUTOMATED COUNT: 55.2 FL
GFR SERPLBLD BASED ON 1.73 SQ M-ARVRAT: >60 ML/MIN/{1.73_M2}
GLOBULIN UR ELPH-MCNC: 3.3 G/DL (ref 2–3.5)
GLUCOSE SERPL-MCNC: 96 MG/DL (ref 70–99)
HBA1C MFR BLD: 13.1 G/DL (ref 14–18)
HCT VFR BLD AUTO: 42.6 % (ref 42–52)
LYMPHOCYTES # BLD AUTO: 1.74 10*3/UL (ref 1–5)
LYMPHOCYTES NFR BLD AUTO: 8.6 % (ref 20–45)
MAGNESIUM SERPL-MCNC: 1.76 MG/DL (ref 1.6–2.3)
MCH RBC QN AUTO: 27.6 PG (ref 27–31)
MCHC RBC AUTO-ENTMCNC: 30.8 G/DL (ref 33–37)
MCV RBC AUTO: 89.7 FL (ref 80–96)
MONOCYTES # BLD AUTO: 1.97 10*3/UL (ref 0.2–1.2)
MONOCYTES NFR BLD MANUAL: 9.7 % (ref 3–10)
NEUTROPHILS # BLD AUTO: 15.13 10*3/UL (ref 2–8)
NEUTROPHILS NFR BLD MANUAL: 74.6 % (ref 30–85)
OSMOLALITY SERPL CALC.SUM OF ELEC: 292 MOSM/KG (ref 273–304)
PLATELET # BLD AUTO: 237 10*3/UL (ref 130–400)
PMV BLD AUTO: 9.8 FL (ref 7.4–10.4)
POTASSIUM SERPL-SCNC: 3.7 MMOL/L (ref 3.5–5.3)
RBC MORPH BLD: 4.75 10*6/UL (ref 4.7–6.1)
SODIUM SERPL-SCNC: 141 MMOL/L (ref 135–147)
WBC # BLD AUTO: 20.27 10*3/UL (ref 4.8–10.8)

## 2019-04-29 ENCOUNTER — HOSPITAL ENCOUNTER (OUTPATIENT)
Dept: CT IMAGING | Facility: HOSPITAL | Age: 69
Discharge: HOME | End: 2019-04-29
Attending: NURSE PRACTITIONER

## 2019-05-01 ENCOUNTER — OFFICE VISIT CONVERTED (OUTPATIENT)
Dept: ONCOLOGY | Facility: HOSPITAL | Age: 69
End: 2019-05-01
Attending: INTERNAL MEDICINE

## 2019-05-01 ENCOUNTER — HOSPITAL ENCOUNTER (OUTPATIENT)
Dept: OTHER | Facility: HOSPITAL | Age: 69
Setting detail: RECURRING SERIES
Discharge: HOME | End: 2019-05-31
Attending: INTERNAL MEDICINE

## 2019-05-01 LAB
ALBUMIN SERPL-MCNC: 3.7 G/DL (ref 3.5–5)
ALBUMIN/GLOB SERPL: 1.2 {RATIO} (ref 1.4–2.6)
ALP SERPL-CCNC: 337 U/L (ref 56–155)
ALT SERPL-CCNC: 44 U/L (ref 10–40)
ANION GAP SERPL CALC-SCNC: 15 MMOL/L (ref 8–19)
AST SERPL-CCNC: 26 U/L (ref 15–50)
BASOPHILS # BLD AUTO: 0.07 10*3/UL (ref 0–0.2)
BASOPHILS NFR BLD AUTO: 0.3 % (ref 0–3)
BILIRUB SERPL-MCNC: 0.45 MG/DL (ref 0.2–1.3)
BUN SERPL-MCNC: 13 MG/DL (ref 5–25)
BUN/CREAT SERPL: 14 {RATIO} (ref 6–20)
CALCIUM SERPL-MCNC: 9.4 MG/DL (ref 8.7–10.4)
CHLORIDE SERPL-SCNC: 101 MMOL/L (ref 99–111)
CONV ABS IMM GRAN: 0.8 10*3/UL (ref 0–0.54)
CONV CO2: 28 MMOL/L (ref 22–32)
CONV EOSINOPHILS PERCENT BY MANUAL COUNT: 1 % (ref 0–7)
CONV IMMATURE GRAN: 3.8 % (ref 0–0.4)
CONV TOTAL PROTEIN: 6.9 G/DL (ref 6.3–8.2)
CREAT UR-MCNC: 0.91 MG/DL (ref 0.7–1.2)
EOSINOPHIL # BLD MANUAL: 0.21 10*3/UL (ref 0–0.7)
ERYTHROCYTE [DISTWIDTH] IN BLOOD BY AUTOMATED COUNT: 19.6 % (ref 11.5–14.5)
ERYTHROCYTE [DISTWIDTH] IN BLOOD BY AUTOMATED COUNT: 61.6 FL
GFR SERPLBLD BASED ON 1.73 SQ M-ARVRAT: >60 ML/MIN/{1.73_M2}
GLOBULIN UR ELPH-MCNC: 3.2 G/DL (ref 2–3.5)
GLUCOSE SERPL-MCNC: 107 MG/DL (ref 70–99)
HBA1C MFR BLD: 13.1 G/DL (ref 14–18)
HCT VFR BLD AUTO: 42.1 % (ref 42–52)
LYMPHOCYTES # BLD AUTO: 1.42 10*3/UL (ref 1–5)
LYMPHOCYTES NFR BLD AUTO: 6.7 % (ref 20–45)
MAGNESIUM SERPL-MCNC: 1.82 MG/DL (ref 1.6–2.3)
MCH RBC QN AUTO: 28.2 PG (ref 27–31)
MCHC RBC AUTO-ENTMCNC: 31.1 G/DL (ref 33–37)
MCV RBC AUTO: 90.7 FL (ref 80–96)
MONOCYTES # BLD AUTO: 1.83 10*3/UL (ref 0.2–1.2)
MONOCYTES NFR BLD MANUAL: 8.7 % (ref 3–10)
NEUTROPHILS # BLD AUTO: 16.71 10*3/UL (ref 2–8)
NEUTROPHILS NFR BLD MANUAL: 79.5 % (ref 30–85)
OSMOLALITY SERPL CALC.SUM OF ELEC: 291 MOSM/KG (ref 273–304)
PLATELET # BLD AUTO: 215 10*3/UL (ref 130–400)
PMV BLD AUTO: 9.4 FL (ref 7.4–10.4)
POTASSIUM SERPL-SCNC: 3.9 MMOL/L (ref 3.5–5.3)
RBC MORPH BLD: 4.64 10*6/UL (ref 4.7–6.1)
SODIUM SERPL-SCNC: 140 MMOL/L (ref 135–147)
WBC # BLD AUTO: 21.04 10*3/UL (ref 4.8–10.8)

## 2019-05-10 LAB
APTT BLD: 20.3 S (ref 22.2–34.2)
BASOPHILS # BLD AUTO: 0.07 10*3/UL (ref 0–0.2)
BASOPHILS NFR BLD AUTO: 0.3 % (ref 0–3)
CONV ABS IMM GRAN: 0.22 10*3/UL (ref 0–0.2)
CONV IMMATURE GRAN: 1 % (ref 0–1.8)
DEPRECATED RDW RBC AUTO: 63.9 FL (ref 35.1–43.9)
EOSINOPHIL # BLD AUTO: 0.22 10*3/UL (ref 0–0.7)
EOSINOPHIL # BLD AUTO: 1 % (ref 0–7)
ERYTHROCYTE [DISTWIDTH] IN BLOOD BY AUTOMATED COUNT: 19.6 % (ref 11.6–14.4)
HBA1C MFR BLD: 13 G/DL (ref 14–18)
HCT VFR BLD AUTO: 41.4 % (ref 42–52)
INR PPP: 1.04 (ref 2–3)
LYMPHOCYTES # BLD AUTO: 0.96 10*3/UL (ref 1–5)
MCH RBC QN AUTO: 28.3 PG (ref 27–31)
MCHC RBC AUTO-ENTMCNC: 31.4 G/DL (ref 33–37)
MCV RBC AUTO: 90.2 FL (ref 80–96)
MONOCYTES # BLD AUTO: 1.25 10*3/UL (ref 0.2–1.2)
MONOCYTES NFR BLD AUTO: 5.9 % (ref 3–10)
NEUTROPHILS # BLD AUTO: 18.39 10*3/UL (ref 2–8)
NEUTROPHILS NFR BLD AUTO: 87.3 % (ref 30–85)
NRBC CBCN: 0 % (ref 0–0.7)
PLATELET # BLD AUTO: 340 10*3/UL (ref 130–400)
PMV BLD AUTO: 10.1 FL (ref 9.4–12.4)
PROTHROMBIN TIME: 10.7 S (ref 9.4–12)
RBC # BLD AUTO: 4.59 10*6/UL (ref 4.7–6.1)
VARIANT LYMPHS NFR BLD MANUAL: 4.5 % (ref 20–45)
WBC # BLD AUTO: 21.11 10*3/UL (ref 4.8–10.8)

## 2019-05-13 ENCOUNTER — HOSPITAL ENCOUNTER (OUTPATIENT)
Dept: CT IMAGING | Facility: HOSPITAL | Age: 69
Discharge: HOME | End: 2019-05-13
Attending: NURSE PRACTITIONER

## 2019-05-15 ENCOUNTER — OFFICE VISIT CONVERTED (OUTPATIENT)
Dept: ONCOLOGY | Facility: HOSPITAL | Age: 69
End: 2019-05-15
Attending: INTERNAL MEDICINE

## 2019-05-15 ENCOUNTER — HOSPITAL ENCOUNTER (OUTPATIENT)
Dept: OTHER | Facility: HOSPITAL | Age: 69
Discharge: HOME | End: 2019-05-15
Attending: INTERNAL MEDICINE

## 2019-05-15 LAB
ALBUMIN SERPL-MCNC: 3.5 G/DL (ref 3.5–5)
ALBUMIN/GLOB SERPL: 0.9 {RATIO} (ref 1.4–2.6)
ALP SERPL-CCNC: 129 U/L (ref 56–155)
ALT SERPL-CCNC: 24 U/L (ref 10–40)
ANION GAP SERPL CALC-SCNC: 15 MMOL/L (ref 8–19)
AST SERPL-CCNC: 21 U/L (ref 15–50)
BASOPHILS # BLD AUTO: 0.06 10*3/UL (ref 0–0.2)
BASOPHILS NFR BLD AUTO: 0.3 % (ref 0–3)
BILIRUB SERPL-MCNC: 0.79 MG/DL (ref 0.2–1.3)
BUN SERPL-MCNC: 15 MG/DL (ref 5–25)
BUN/CREAT SERPL: 21 {RATIO} (ref 6–20)
CALCIUM SERPL-MCNC: 9.8 MG/DL (ref 8.7–10.4)
CHLORIDE SERPL-SCNC: 101 MMOL/L (ref 99–111)
CONV ABS IMM GRAN: 0.1 10*3/UL (ref 0–0.2)
CONV CO2: 28 MMOL/L (ref 22–32)
CONV IMMATURE GRAN: 0.5 % (ref 0–1.8)
CONV TOTAL PROTEIN: 7.4 G/DL (ref 6.3–8.2)
CREAT UR-MCNC: 0.7 MG/DL (ref 0.7–1.2)
DEPRECATED RDW RBC AUTO: 63.7 FL (ref 35.1–43.9)
EOSINOPHIL # BLD AUTO: 0.12 10*3/UL (ref 0–0.7)
EOSINOPHIL # BLD AUTO: 0.6 % (ref 0–7)
ERYTHROCYTE [DISTWIDTH] IN BLOOD BY AUTOMATED COUNT: 19.1 % (ref 11.6–14.4)
GFR SERPLBLD BASED ON 1.73 SQ M-ARVRAT: >60 ML/MIN/{1.73_M2}
GLOBULIN UR ELPH-MCNC: 3.9 G/DL (ref 2–3.5)
GLUCOSE SERPL-MCNC: 104 MG/DL (ref 70–99)
HBA1C MFR BLD: 12.6 G/DL (ref 14–18)
HCT VFR BLD AUTO: 40.5 % (ref 42–52)
LYMPHOCYTES # BLD AUTO: 1.09 10*3/UL (ref 1–5)
MCH RBC QN AUTO: 28.4 PG (ref 27–31)
MCHC RBC AUTO-ENTMCNC: 31.1 G/DL (ref 33–37)
MCV RBC AUTO: 91.2 FL (ref 80–96)
MONOCYTES # BLD AUTO: 1.12 10*3/UL (ref 0.2–1.2)
MONOCYTES NFR BLD AUTO: 6 % (ref 3–10)
NEUTROPHILS # BLD AUTO: 16.22 10*3/UL (ref 2–8)
NEUTROPHILS NFR BLD AUTO: 86.8 % (ref 30–85)
NRBC CBCN: 0 % (ref 0–0.7)
OSMOLALITY SERPL CALC.SUM OF ELEC: 291 MOSM/KG (ref 273–304)
PLATELET # BLD AUTO: 351 10*3/UL (ref 130–400)
PMV BLD AUTO: 10.1 FL (ref 9.4–12.4)
POTASSIUM SERPL-SCNC: 4.1 MMOL/L (ref 3.5–5.3)
RBC # BLD AUTO: 4.44 10*6/UL (ref 4.7–6.1)
SODIUM SERPL-SCNC: 140 MMOL/L (ref 135–147)
T4 FREE SERPL-MCNC: 1.4 NG/DL (ref 0.9–1.8)
TSH SERPL-ACNC: 1.86 M[IU]/L (ref 0.27–4.2)
VARIANT LYMPHS NFR BLD MANUAL: 5.8 % (ref 20–45)
WBC # BLD AUTO: 18.71 10*3/UL (ref 4.8–10.8)

## 2019-05-22 LAB
ALBUMIN SERPL-MCNC: 3.4 G/DL (ref 3.5–5)
ALBUMIN/GLOB SERPL: 0.9 {RATIO} (ref 1.4–2.6)
ALP SERPL-CCNC: 120 U/L (ref 56–155)
ALT SERPL-CCNC: 19 U/L (ref 10–40)
ANION GAP SERPL CALC-SCNC: 16 MMOL/L (ref 8–19)
AST SERPL-CCNC: 15 U/L (ref 15–50)
BASOPHILS # BLD AUTO: 0.04 10*3/UL (ref 0–0.2)
BASOPHILS NFR BLD AUTO: 0.2 % (ref 0–3)
BILIRUB SERPL-MCNC: 0.46 MG/DL (ref 0.2–1.3)
BUN SERPL-MCNC: 19 MG/DL (ref 5–25)
BUN/CREAT SERPL: 24 {RATIO} (ref 6–20)
CALCIUM SERPL-MCNC: 9.8 MG/DL (ref 8.7–10.4)
CHLORIDE SERPL-SCNC: 99 MMOL/L (ref 99–111)
CONV ABS IMM GRAN: 0.05 10*3/UL (ref 0–0.54)
CONV CO2: 28 MMOL/L (ref 22–32)
CONV EOSINOPHILS PERCENT BY MANUAL COUNT: 1.5 % (ref 0–7)
CONV IMMATURE GRAN: 0.3 % (ref 0–0.4)
CONV TOTAL PROTEIN: 7.3 G/DL (ref 6.3–8.2)
CREAT UR-MCNC: 0.79 MG/DL (ref 0.7–1.2)
EOSINOPHIL # BLD MANUAL: 0.25 10*3/UL (ref 0–0.7)
ERYTHROCYTE [DISTWIDTH] IN BLOOD BY AUTOMATED COUNT: 17.8 % (ref 11.5–14.5)
ERYTHROCYTE [DISTWIDTH] IN BLOOD BY AUTOMATED COUNT: 60.8 FL
GFR SERPLBLD BASED ON 1.73 SQ M-ARVRAT: >60 ML/MIN/{1.73_M2}
GLOBULIN UR ELPH-MCNC: 3.9 G/DL (ref 2–3.5)
GLUCOSE SERPL-MCNC: 99 MG/DL (ref 70–99)
HBA1C MFR BLD: 12 G/DL (ref 14–18)
HCT VFR BLD AUTO: 39.6 % (ref 42–52)
LYMPHOCYTES # BLD AUTO: 1.36 10*3/UL (ref 1–5)
LYMPHOCYTES NFR BLD AUTO: 8 % (ref 20–45)
MCH RBC QN AUTO: 27.6 PG (ref 27–31)
MCHC RBC AUTO-ENTMCNC: 30.3 G/DL (ref 33–37)
MCV RBC AUTO: 91 FL (ref 80–96)
MONOCYTES # BLD AUTO: 1.17 10*3/UL (ref 0.2–1.2)
MONOCYTES NFR BLD MANUAL: 6.8 % (ref 3–10)
NEUTROPHILS # BLD AUTO: 14.22 10*3/UL (ref 2–8)
NEUTROPHILS NFR BLD MANUAL: 83.2 % (ref 30–85)
OSMOLALITY SERPL CALC.SUM OF ELEC: 290 MOSM/KG (ref 273–304)
PLATELET # BLD AUTO: 337 10*3/UL (ref 130–400)
PMV BLD AUTO: 9.7 FL (ref 7.4–10.4)
POTASSIUM SERPL-SCNC: 3.9 MMOL/L (ref 3.5–5.3)
RBC MORPH BLD: 4.35 10*6/UL (ref 4.7–6.1)
SODIUM SERPL-SCNC: 139 MMOL/L (ref 135–147)
T4 FREE SERPL-MCNC: 1.3 NG/DL (ref 0.9–1.8)
TSH SERPL-ACNC: 2.46 M[IU]/L (ref 0.27–4.2)
WBC # BLD AUTO: 17.09 10*3/UL (ref 4.8–10.8)

## 2019-06-07 RX ORDER — PRAVASTATIN SODIUM 80 MG/1
80 TABLET ORAL DAILY
Qty: 90 TABLET | Refills: 0 | Status: SHIPPED | OUTPATIENT
Start: 2019-06-07 | End: 2019-09-02 | Stop reason: SDUPTHER

## 2019-06-12 ENCOUNTER — OFFICE VISIT CONVERTED (OUTPATIENT)
Dept: ONCOLOGY | Facility: HOSPITAL | Age: 69
End: 2019-06-12
Attending: INTERNAL MEDICINE

## 2019-06-12 ENCOUNTER — HOSPITAL ENCOUNTER (OUTPATIENT)
Dept: OTHER | Facility: HOSPITAL | Age: 69
Setting detail: RECURRING SERIES
Discharge: HOME | End: 2019-06-30
Attending: INTERNAL MEDICINE

## 2019-06-12 LAB
ALBUMIN SERPL-MCNC: 3.1 G/DL (ref 3.5–5)
ALBUMIN/GLOB SERPL: 0.7 {RATIO} (ref 1.4–2.6)
ALP SERPL-CCNC: 151 U/L (ref 56–155)
ALT SERPL-CCNC: 27 U/L (ref 10–40)
ANION GAP SERPL CALC-SCNC: 17 MMOL/L (ref 8–19)
AST SERPL-CCNC: 23 U/L (ref 15–50)
BASOPHILS # BLD AUTO: 0.04 10*3/UL (ref 0–0.2)
BASOPHILS NFR BLD AUTO: 0.2 % (ref 0–3)
BILIRUB SERPL-MCNC: 0.51 MG/DL (ref 0.2–1.3)
BUN SERPL-MCNC: 18 MG/DL (ref 5–25)
BUN/CREAT SERPL: 26 {RATIO} (ref 6–20)
CALCIUM SERPL-MCNC: 9.8 MG/DL (ref 8.7–10.4)
CHLORIDE SERPL-SCNC: 98 MMOL/L (ref 99–111)
CONV ABS IMM GRAN: 0.06 10*3/UL (ref 0–0.54)
CONV CO2: 28 MMOL/L (ref 22–32)
CONV EOSINOPHILS PERCENT BY MANUAL COUNT: 2 % (ref 0–7)
CONV IMMATURE GRAN: 0.4 % (ref 0–0.4)
CONV TOTAL PROTEIN: 7.4 G/DL (ref 6.3–8.2)
CREAT UR-MCNC: 0.7 MG/DL (ref 0.7–1.2)
EOSINOPHIL # BLD MANUAL: 0.34 10*3/UL (ref 0–0.7)
ERYTHROCYTE [DISTWIDTH] IN BLOOD BY AUTOMATED COUNT: 16.9 % (ref 11.5–14.5)
ERYTHROCYTE [DISTWIDTH] IN BLOOD BY AUTOMATED COUNT: 56 FL
GFR SERPLBLD BASED ON 1.73 SQ M-ARVRAT: >60 ML/MIN/{1.73_M2}
GLOBULIN UR ELPH-MCNC: 4.3 G/DL (ref 2–3.5)
GLUCOSE SERPL-MCNC: 104 MG/DL (ref 70–99)
HBA1C MFR BLD: 11.9 G/DL (ref 14–18)
HCT VFR BLD AUTO: 39.7 % (ref 42–52)
LYMPHOCYTES # BLD AUTO: 0.83 10*3/UL (ref 1–5)
LYMPHOCYTES NFR BLD AUTO: 5 % (ref 20–45)
MCH RBC QN AUTO: 26.9 PG (ref 27–31)
MCHC RBC AUTO-ENTMCNC: 30 G/DL (ref 33–37)
MCV RBC AUTO: 89.8 FL (ref 80–96)
MONOCYTES # BLD AUTO: 0.99 10*3/UL (ref 0.2–1.2)
MONOCYTES NFR BLD MANUAL: 5.9 % (ref 3–10)
NEUTROPHILS # BLD AUTO: 14.42 10*3/UL (ref 2–8)
NEUTROPHILS NFR BLD MANUAL: 86.5 % (ref 30–85)
OSMOLALITY SERPL CALC.SUM OF ELEC: 290 MOSM/KG (ref 273–304)
PLATELET # BLD AUTO: 358 10*3/UL (ref 130–400)
PMV BLD AUTO: 9.2 FL (ref 7.4–10.4)
POTASSIUM SERPL-SCNC: 4 MMOL/L (ref 3.5–5.3)
RBC MORPH BLD: 4.42 10*6/UL (ref 4.7–6.1)
SODIUM SERPL-SCNC: 139 MMOL/L (ref 135–147)
WBC # BLD AUTO: 16.68 10*3/UL (ref 4.8–10.8)

## 2019-07-03 ENCOUNTER — OFFICE VISIT CONVERTED (OUTPATIENT)
Dept: ONCOLOGY | Facility: HOSPITAL | Age: 69
End: 2019-07-03
Attending: NURSE PRACTITIONER

## 2019-07-03 ENCOUNTER — HOSPITAL ENCOUNTER (OUTPATIENT)
Dept: OTHER | Facility: HOSPITAL | Age: 69
Setting detail: RECURRING SERIES
Discharge: HOME | End: 2019-07-31
Attending: INTERNAL MEDICINE

## 2019-07-03 LAB
ALBUMIN SERPL-MCNC: 2.9 G/DL (ref 3.5–5)
ALBUMIN/GLOB SERPL: 0.7 {RATIO} (ref 1.4–2.6)
ALP SERPL-CCNC: 107 U/L (ref 56–155)
ALT SERPL-CCNC: 17 U/L (ref 10–40)
ANION GAP SERPL CALC-SCNC: 16 MMOL/L (ref 8–19)
AST SERPL-CCNC: 22 U/L (ref 15–50)
BASOPHILS # BLD AUTO: 0.03 10*3/UL (ref 0–0.2)
BASOPHILS NFR BLD AUTO: 0.2 % (ref 0–3)
BILIRUB SERPL-MCNC: 0.39 MG/DL (ref 0.2–1.3)
BUN SERPL-MCNC: 18 MG/DL (ref 5–25)
BUN/CREAT SERPL: 20 {RATIO} (ref 6–20)
CALCIUM SERPL-MCNC: 9.8 MG/DL (ref 8.7–10.4)
CHLORIDE SERPL-SCNC: 101 MMOL/L (ref 99–111)
CONV ABS IMM GRAN: 0.06 10*3/UL (ref 0–0.54)
CONV CO2: 27 MMOL/L (ref 22–32)
CONV EOSINOPHILS PERCENT BY MANUAL COUNT: 3.2 % (ref 0–7)
CONV IMMATURE GRAN: 0.4 % (ref 0–0.4)
CONV TOTAL PROTEIN: 7.2 G/DL (ref 6.3–8.2)
CREAT UR-MCNC: 0.88 MG/DL (ref 0.7–1.2)
EOSINOPHIL # BLD MANUAL: 0.49 10*3/UL (ref 0–0.7)
ERYTHROCYTE [DISTWIDTH] IN BLOOD BY AUTOMATED COUNT: 15.9 % (ref 11.5–14.5)
ERYTHROCYTE [DISTWIDTH] IN BLOOD BY AUTOMATED COUNT: 52.4 FL
GFR SERPLBLD BASED ON 1.73 SQ M-ARVRAT: >60 ML/MIN/{1.73_M2}
GLOBULIN UR ELPH-MCNC: 4.3 G/DL (ref 2–3.5)
GLUCOSE SERPL-MCNC: 123 MG/DL (ref 70–99)
HBA1C MFR BLD: 12 G/DL (ref 14–18)
HCT VFR BLD AUTO: 39.7 % (ref 42–52)
LYMPHOCYTES # BLD AUTO: 0.91 10*3/UL (ref 1–5)
LYMPHOCYTES NFR BLD AUTO: 6 % (ref 20–45)
MCH RBC QN AUTO: 26.8 PG (ref 27–31)
MCHC RBC AUTO-ENTMCNC: 30.2 G/DL (ref 33–37)
MCV RBC AUTO: 88.8 FL (ref 80–96)
MONOCYTES # BLD AUTO: 0.85 10*3/UL (ref 0.2–1.2)
MONOCYTES NFR BLD MANUAL: 5.6 % (ref 3–10)
NEUTROPHILS # BLD AUTO: 12.93 10*3/UL (ref 2–8)
NEUTROPHILS NFR BLD MANUAL: 84.6 % (ref 30–85)
OSMOLALITY SERPL CALC.SUM OF ELEC: 293 MOSM/KG (ref 273–304)
PLATELET # BLD AUTO: 366 10*3/UL (ref 130–400)
PMV BLD AUTO: 9.5 FL (ref 7.4–10.4)
POTASSIUM SERPL-SCNC: 3.7 MMOL/L (ref 3.5–5.3)
RBC MORPH BLD: 4.47 10*6/UL (ref 4.7–6.1)
SODIUM SERPL-SCNC: 140 MMOL/L (ref 135–147)
T4 FREE SERPL-MCNC: 1.4 NG/DL (ref 0.9–1.8)
TSH SERPL-ACNC: 2.49 M[IU]/L (ref 0.27–4.2)
WBC # BLD AUTO: 15.27 10*3/UL (ref 4.8–10.8)

## 2019-07-18 ENCOUNTER — HOSPITAL ENCOUNTER (OUTPATIENT)
Dept: CT IMAGING | Facility: HOSPITAL | Age: 69
Discharge: HOME | End: 2019-07-18
Attending: NURSE PRACTITIONER

## 2019-07-31 ENCOUNTER — OFFICE VISIT CONVERTED (OUTPATIENT)
Dept: ONCOLOGY | Facility: HOSPITAL | Age: 69
End: 2019-07-31
Attending: INTERNAL MEDICINE

## 2019-07-31 ENCOUNTER — HOSPITAL ENCOUNTER (OUTPATIENT)
Dept: OTHER | Facility: HOSPITAL | Age: 69
Discharge: HOME | End: 2019-07-31
Attending: INTERNAL MEDICINE

## 2019-07-31 LAB
ALBUMIN SERPL-MCNC: 3.3 G/DL (ref 3.5–5)
ALBUMIN/GLOB SERPL: 0.8 {RATIO} (ref 1.4–2.6)
ALP SERPL-CCNC: 132 U/L (ref 56–155)
ALT SERPL-CCNC: 19 U/L (ref 10–40)
ANION GAP SERPL CALC-SCNC: 15 MMOL/L (ref 8–19)
APPEARANCE UR: CLEAR
AST SERPL-CCNC: 19 U/L (ref 15–50)
BASOPHILS # BLD AUTO: 0.06 10*3/UL (ref 0–0.2)
BASOPHILS NFR BLD AUTO: 0.4 % (ref 0–3)
BILIRUB SERPL-MCNC: 0.36 MG/DL (ref 0.2–1.3)
BILIRUB UR QL: NEGATIVE
BUN SERPL-MCNC: 18 MG/DL (ref 5–25)
BUN/CREAT SERPL: 25 {RATIO} (ref 6–20)
CALCIUM SERPL-MCNC: 9.7 MG/DL (ref 8.7–10.4)
CHLORIDE SERPL-SCNC: 101 MMOL/L (ref 99–111)
COLOR UR: YELLOW
CONV ABS IMM GRAN: 0.08 10*3/UL (ref 0–0.2)
CONV CO2: 28 MMOL/L (ref 22–32)
CONV COLLECTION SOURCE (UA): NORMAL
CONV IMMATURE GRAN: 0.5 % (ref 0–1.8)
CONV TOTAL PROTEIN: 7.2 G/DL (ref 6.3–8.2)
CONV UROBILINOGEN IN URINE BY AUTOMATED TEST STRIP: 0.2 {EHRLICHU}/DL (ref 0.1–1)
CREAT UR-MCNC: 0.73 MG/DL (ref 0.7–1.2)
DEPRECATED RDW RBC AUTO: 51.3 FL (ref 35.1–43.9)
EOSINOPHIL # BLD AUTO: 0.46 10*3/UL (ref 0–0.7)
EOSINOPHIL # BLD AUTO: 2.8 % (ref 0–7)
ERYTHROCYTE [DISTWIDTH] IN BLOOD BY AUTOMATED COUNT: 16.1 % (ref 11.6–14.4)
GFR SERPLBLD BASED ON 1.73 SQ M-ARVRAT: >60 ML/MIN/{1.73_M2}
GLOBULIN UR ELPH-MCNC: 3.9 G/DL (ref 2–3.5)
GLUCOSE SERPL-MCNC: 94 MG/DL (ref 70–99)
GLUCOSE UR QL: NEGATIVE MG/DL
HBA1C MFR BLD: 12.3 G/DL (ref 14–18)
HCT VFR BLD AUTO: 40.8 % (ref 42–52)
HGB UR QL STRIP: NEGATIVE
KETONES UR QL STRIP: NEGATIVE MG/DL
LEUKOCYTE ESTERASE UR QL STRIP: NEGATIVE
LYMPHOCYTES # BLD AUTO: 1.3 10*3/UL (ref 1–5)
MCH RBC QN AUTO: 26.3 PG (ref 27–31)
MCHC RBC AUTO-ENTMCNC: 30.1 G/DL (ref 33–37)
MCV RBC AUTO: 87.4 FL (ref 80–96)
MONOCYTES # BLD AUTO: 1.07 10*3/UL (ref 0.2–1.2)
MONOCYTES NFR BLD AUTO: 6.6 % (ref 3–10)
NEUTROPHILS # BLD AUTO: 13.33 10*3/UL (ref 2–8)
NEUTROPHILS NFR BLD AUTO: 81.7 % (ref 30–85)
NITRITE UR QL STRIP: NEGATIVE
NRBC CBCN: 0 % (ref 0–0.7)
OSMOLALITY SERPL CALC.SUM OF ELEC: 292 MOSM/KG (ref 273–304)
PH UR STRIP.AUTO: 7.5 [PH] (ref 5–8)
PLATELET # BLD AUTO: 395 10*3/UL (ref 130–400)
PMV BLD AUTO: 10.1 FL (ref 9.4–12.4)
POTASSIUM SERPL-SCNC: 4.1 MMOL/L (ref 3.5–5.3)
PROT UR QL: NEGATIVE MG/DL
RBC # BLD AUTO: 4.67 10*6/UL (ref 4.7–6.1)
SODIUM SERPL-SCNC: 140 MMOL/L (ref 135–147)
SP GR UR: 1.01 (ref 1–1.03)
VARIANT LYMPHS NFR BLD MANUAL: 8 % (ref 20–45)
WBC # BLD AUTO: 16.3 10*3/UL (ref 4.8–10.8)

## 2019-08-07 ENCOUNTER — HOSPITAL ENCOUNTER (OUTPATIENT)
Dept: OTHER | Facility: HOSPITAL | Age: 69
Setting detail: RECURRING SERIES
Discharge: HOME | End: 2019-08-31
Attending: INTERNAL MEDICINE

## 2019-08-07 LAB
ALBUMIN SERPL-MCNC: 3.4 G/DL (ref 3.5–5)
ALBUMIN/GLOB SERPL: 0.8 {RATIO} (ref 1.4–2.6)
ALP SERPL-CCNC: 122 U/L (ref 56–155)
ALT SERPL-CCNC: 15 U/L (ref 10–40)
ANION GAP SERPL CALC-SCNC: 17 MMOL/L (ref 8–19)
AST SERPL-CCNC: 19 U/L (ref 15–50)
BILIRUB SERPL-MCNC: 0.32 MG/DL (ref 0.2–1.3)
BUN SERPL-MCNC: 16 MG/DL (ref 5–25)
BUN/CREAT SERPL: 22 {RATIO} (ref 6–20)
CALCIUM SERPL-MCNC: 9.8 MG/DL (ref 8.7–10.4)
CHLORIDE SERPL-SCNC: 100 MMOL/L (ref 99–111)
CONV CO2: 29 MMOL/L (ref 22–32)
CONV TOTAL PROTEIN: 7.5 G/DL (ref 6.3–8.2)
CREAT UR-MCNC: 0.72 MG/DL (ref 0.7–1.2)
GFR SERPLBLD BASED ON 1.73 SQ M-ARVRAT: >60 ML/MIN/{1.73_M2}
GLOBULIN UR ELPH-MCNC: 4.1 G/DL (ref 2–3.5)
GLUCOSE SERPL-MCNC: 87 MG/DL (ref 70–99)
OSMOLALITY SERPL CALC.SUM OF ELEC: 295 MOSM/KG (ref 273–304)
POTASSIUM SERPL-SCNC: 3.6 MMOL/L (ref 3.5–5.3)
SODIUM SERPL-SCNC: 142 MMOL/L (ref 135–147)
T4 FREE SERPL-MCNC: 1.4 NG/DL (ref 0.9–1.8)
TSH SERPL-ACNC: 3.2 M[IU]/L (ref 0.27–4.2)

## 2019-08-14 ENCOUNTER — OFFICE VISIT CONVERTED (OUTPATIENT)
Dept: ONCOLOGY | Facility: HOSPITAL | Age: 69
End: 2019-08-14
Attending: INTERNAL MEDICINE

## 2019-08-14 LAB
ALBUMIN SERPL-MCNC: 3 G/DL (ref 3.5–5)
ALBUMIN/GLOB SERPL: 0.8 {RATIO} (ref 1.4–2.6)
ALP SERPL-CCNC: 117 U/L (ref 56–155)
ALT SERPL-CCNC: 18 U/L (ref 10–40)
ANION GAP SERPL CALC-SCNC: 16 MMOL/L (ref 8–19)
AST SERPL-CCNC: 22 U/L (ref 15–50)
BASOPHILS # BLD AUTO: 0.06 10*3/UL (ref 0–0.2)
BASOPHILS NFR BLD AUTO: 0.5 % (ref 0–3)
BILIRUB SERPL-MCNC: 0.37 MG/DL (ref 0.2–1.3)
BUN SERPL-MCNC: 17 MG/DL (ref 5–25)
BUN/CREAT SERPL: 26 {RATIO} (ref 6–20)
CALCIUM SERPL-MCNC: 9.5 MG/DL (ref 8.7–10.4)
CHLORIDE SERPL-SCNC: 101 MMOL/L (ref 99–111)
CONV ABS IMM GRAN: 0.06 10*3/UL (ref 0–0.54)
CONV CO2: 25 MMOL/L (ref 22–32)
CONV EOSINOPHILS PERCENT BY MANUAL COUNT: 3 % (ref 0–7)
CONV IMMATURE GRAN: 0.5 % (ref 0–0.4)
CONV TOTAL PROTEIN: 6.6 G/DL (ref 6.3–8.2)
CREAT UR-MCNC: 0.66 MG/DL (ref 0.7–1.2)
EOSINOPHIL # BLD MANUAL: 0.36 10*3/UL (ref 0–0.7)
ERYTHROCYTE [DISTWIDTH] IN BLOOD BY AUTOMATED COUNT: 16.3 % (ref 11.5–14.5)
ERYTHROCYTE [DISTWIDTH] IN BLOOD BY AUTOMATED COUNT: 50.6 FL
GFR SERPLBLD BASED ON 1.73 SQ M-ARVRAT: >60 ML/MIN/{1.73_M2}
GLOBULIN UR ELPH-MCNC: 3.6 G/DL (ref 2–3.5)
GLUCOSE SERPL-MCNC: 84 MG/DL (ref 70–99)
HBA1C MFR BLD: 12.2 G/DL (ref 14–18)
HCT VFR BLD AUTO: 41 % (ref 42–52)
LYMPHOCYTES # BLD AUTO: 1.16 10*3/UL (ref 1–5)
LYMPHOCYTES NFR BLD AUTO: 9.7 % (ref 20–45)
MCH RBC QN AUTO: 25.6 PG (ref 27–31)
MCHC RBC AUTO-ENTMCNC: 29.8 G/DL (ref 33–37)
MCV RBC AUTO: 86.1 FL (ref 80–96)
MONOCYTES # BLD AUTO: 0.56 10*3/UL (ref 0.2–1.2)
MONOCYTES NFR BLD MANUAL: 4.7 % (ref 3–10)
NEUTROPHILS # BLD AUTO: 9.72 10*3/UL (ref 2–8)
NEUTROPHILS NFR BLD MANUAL: 81.6 % (ref 30–85)
OSMOLALITY SERPL CALC.SUM OF ELEC: 287 MOSM/KG (ref 273–304)
PLATELET # BLD AUTO: 344 10*3/UL (ref 130–400)
PMV BLD AUTO: 9.7 FL (ref 7.4–10.4)
POTASSIUM SERPL-SCNC: 4 MMOL/L (ref 3.5–5.3)
RBC MORPH BLD: 4.76 10*6/UL (ref 4.7–6.1)
SODIUM SERPL-SCNC: 138 MMOL/L (ref 135–147)
WBC # BLD AUTO: 11.92 10*3/UL (ref 4.8–10.8)

## 2019-08-21 LAB
BASOPHILS # BLD AUTO: 0.04 10*3/UL (ref 0–0.2)
BASOPHILS NFR BLD AUTO: 0.5 % (ref 0–3)
CONV ABS IMM GRAN: 0.04 10*3/UL (ref 0–0.54)
CONV EOSINOPHILS PERCENT BY MANUAL COUNT: 2.5 % (ref 0–7)
CONV IMMATURE GRAN: 0.5 % (ref 0–0.4)
EOSINOPHIL # BLD MANUAL: 0.2 10*3/UL (ref 0–0.7)
ERYTHROCYTE [DISTWIDTH] IN BLOOD BY AUTOMATED COUNT: 17.6 % (ref 11.5–14.5)
ERYTHROCYTE [DISTWIDTH] IN BLOOD BY AUTOMATED COUNT: 52.5 FL
HBA1C MFR BLD: 12 G/DL (ref 14–18)
HCT VFR BLD AUTO: 39.3 % (ref 42–52)
LYMPHOCYTES # BLD AUTO: 1.37 10*3/UL (ref 1–5)
LYMPHOCYTES NFR BLD AUTO: 16.8 % (ref 20–45)
MCH RBC QN AUTO: 26.3 PG (ref 27–31)
MCHC RBC AUTO-ENTMCNC: 30.5 G/DL (ref 33–37)
MCV RBC AUTO: 86 FL (ref 80–96)
MONOCYTES # BLD AUTO: 0.35 10*3/UL (ref 0.2–1.2)
MONOCYTES NFR BLD MANUAL: 4.3 % (ref 3–10)
NEUTROPHILS # BLD AUTO: 6.15 10*3/UL (ref 2–8)
NEUTROPHILS NFR BLD MANUAL: 75.4 % (ref 30–85)
PLATELET # BLD AUTO: 322 10*3/UL (ref 130–400)
PMV BLD AUTO: 10.1 FL (ref 7.4–10.4)
RBC MORPH BLD: 4.57 10*6/UL (ref 4.7–6.1)
WBC # BLD AUTO: 8.15 10*3/UL (ref 4.8–10.8)

## 2019-09-04 ENCOUNTER — OFFICE VISIT CONVERTED (OUTPATIENT)
Dept: ONCOLOGY | Facility: HOSPITAL | Age: 69
End: 2019-09-04
Attending: INTERNAL MEDICINE

## 2019-09-04 ENCOUNTER — HOSPITAL ENCOUNTER (OUTPATIENT)
Dept: OTHER | Facility: HOSPITAL | Age: 69
Setting detail: RECURRING SERIES
Discharge: HOME | End: 2019-09-30
Attending: INTERNAL MEDICINE

## 2019-09-04 LAB
ALBUMIN SERPL-MCNC: 3.3 G/DL (ref 3.5–5)
ALBUMIN/GLOB SERPL: 1 {RATIO} (ref 1.4–2.6)
ALP SERPL-CCNC: 122 U/L (ref 56–155)
ALT SERPL-CCNC: 21 U/L (ref 10–40)
ANION GAP SERPL CALC-SCNC: 15 MMOL/L (ref 8–19)
APPEARANCE UR: CLEAR
AST SERPL-CCNC: 19 U/L (ref 15–50)
BASOPHILS # BLD AUTO: 0.06 10*3/UL (ref 0–0.2)
BASOPHILS NFR BLD AUTO: 0.5 % (ref 0–3)
BILIRUB SERPL-MCNC: 0.41 MG/DL (ref 0.2–1.3)
BILIRUB UR QL: NEGATIVE
BUN SERPL-MCNC: 13 MG/DL (ref 5–25)
BUN/CREAT SERPL: 18 {RATIO} (ref 6–20)
CALCIUM SERPL-MCNC: 9.4 MG/DL (ref 8.7–10.4)
CHLORIDE SERPL-SCNC: 99 MMOL/L (ref 99–111)
COLOR UR: YELLOW
CONV ABS IMM GRAN: 0.08 10*3/UL (ref 0–0.54)
CONV CO2: 25 MMOL/L (ref 22–32)
CONV COLLECTION SOURCE (UA): NORMAL
CONV EOSINOPHILS PERCENT BY MANUAL COUNT: 1.1 % (ref 0–7)
CONV IMMATURE GRAN: 0.6 % (ref 0–0.4)
CONV TOTAL PROTEIN: 6.7 G/DL (ref 6.3–8.2)
CONV UROBILINOGEN IN URINE BY AUTOMATED TEST STRIP: 0.2 {EHRLICHU}/DL (ref 0.1–1)
CREAT UR-MCNC: 0.74 MG/DL (ref 0.7–1.2)
EOSINOPHIL # BLD MANUAL: 0.14 10*3/UL (ref 0–0.7)
ERYTHROCYTE [DISTWIDTH] IN BLOOD BY AUTOMATED COUNT: 19.2 % (ref 11.5–14.5)
ERYTHROCYTE [DISTWIDTH] IN BLOOD BY AUTOMATED COUNT: 56.8 FL
GFR SERPLBLD BASED ON 1.73 SQ M-ARVRAT: >60 ML/MIN/{1.73_M2}
GLOBULIN UR ELPH-MCNC: 3.4 G/DL (ref 2–3.5)
GLUCOSE SERPL-MCNC: 108 MG/DL (ref 70–99)
GLUCOSE UR QL: NEGATIVE MG/DL
HBA1C MFR BLD: 12.6 G/DL (ref 14–18)
HCT VFR BLD AUTO: 41.3 % (ref 42–52)
HGB UR QL STRIP: NEGATIVE
KETONES UR QL STRIP: NEGATIVE MG/DL
LEUKOCYTE ESTERASE UR QL STRIP: NEGATIVE
LYMPHOCYTES # BLD AUTO: 1.19 10*3/UL (ref 1–5)
LYMPHOCYTES NFR BLD AUTO: 9.4 % (ref 20–45)
MCH RBC QN AUTO: 26.2 PG (ref 27–31)
MCHC RBC AUTO-ENTMCNC: 30.5 G/DL (ref 33–37)
MCV RBC AUTO: 85.9 FL (ref 80–96)
MONOCYTES # BLD AUTO: 1.36 10*3/UL (ref 0.2–1.2)
MONOCYTES NFR BLD MANUAL: 10.7 % (ref 3–10)
NEUTROPHILS # BLD AUTO: 9.89 10*3/UL (ref 2–8)
NEUTROPHILS NFR BLD MANUAL: 77.7 % (ref 30–85)
NITRITE UR QL STRIP: NEGATIVE
OSMOLALITY SERPL CALC.SUM OF ELEC: 281 MOSM/KG (ref 273–304)
PH UR STRIP.AUTO: 5 [PH] (ref 5–8)
PLATELET # BLD AUTO: 332 10*3/UL (ref 130–400)
PMV BLD AUTO: 9.4 FL (ref 7.4–10.4)
POTASSIUM SERPL-SCNC: 3.8 MMOL/L (ref 3.5–5.3)
PROT UR QL: NEGATIVE MG/DL
RBC MORPH BLD: 4.81 10*6/UL (ref 4.7–6.1)
SODIUM SERPL-SCNC: 135 MMOL/L (ref 135–147)
SP GR UR: 1.01 (ref 1–1.03)
WBC # BLD AUTO: 12.72 10*3/UL (ref 4.8–10.8)

## 2019-09-04 RX ORDER — PRAVASTATIN SODIUM 80 MG/1
80 TABLET ORAL DAILY
Qty: 90 TABLET | Refills: 0 | Status: SHIPPED | OUTPATIENT
Start: 2019-09-04 | End: 2019-12-04 | Stop reason: SDUPTHER

## 2019-09-06 ENCOUNTER — LAB (OUTPATIENT)
Dept: LAB | Facility: HOSPITAL | Age: 69
End: 2019-09-06

## 2019-09-06 ENCOUNTER — OFFICE VISIT (OUTPATIENT)
Dept: CARDIOLOGY | Facility: CLINIC | Age: 69
End: 2019-09-06

## 2019-09-06 VITALS
OXYGEN SATURATION: 97 % | WEIGHT: 252 LBS | RESPIRATION RATE: 18 BRPM | HEIGHT: 71 IN | BODY MASS INDEX: 35.28 KG/M2 | HEART RATE: 74 BPM | DIASTOLIC BLOOD PRESSURE: 88 MMHG | SYSTOLIC BLOOD PRESSURE: 132 MMHG

## 2019-09-06 DIAGNOSIS — I10 ESSENTIAL HYPERTENSION: ICD-10-CM

## 2019-09-06 DIAGNOSIS — I25.10 CORONARY ARTERY DISEASE INVOLVING NATIVE CORONARY ARTERY OF NATIVE HEART WITHOUT ANGINA PECTORIS: Primary | ICD-10-CM

## 2019-09-06 DIAGNOSIS — R60.0 LOWER EXTREMITY EDEMA: ICD-10-CM

## 2019-09-06 DIAGNOSIS — E78.2 MIXED HYPERLIPIDEMIA: ICD-10-CM

## 2019-09-06 LAB
ALBUMIN SERPL-MCNC: 3.8 G/DL (ref 3.5–5.2)
ALBUMIN/GLOB SERPL: 1.2 G/DL
ALP SERPL-CCNC: 113 U/L (ref 39–117)
ALT SERPL W P-5'-P-CCNC: 20 U/L (ref 1–41)
ANION GAP SERPL CALCULATED.3IONS-SCNC: 12.6 MMOL/L (ref 5–15)
AST SERPL-CCNC: 17 U/L (ref 1–40)
BACTERIA UR QL AUTO: NORMAL /HPF
BILIRUB SERPL-MCNC: 0.4 MG/DL (ref 0.2–1.2)
BILIRUB UR QL STRIP: NEGATIVE
BUN BLD-MCNC: 17 MG/DL (ref 8–23)
BUN/CREAT SERPL: 23.9 (ref 7–25)
CALCIUM SPEC-SCNC: 9.3 MG/DL (ref 8.6–10.5)
CHLORIDE SERPL-SCNC: 104 MMOL/L (ref 98–107)
CLARITY UR: CLEAR
CO2 SERPL-SCNC: 27.4 MMOL/L (ref 22–29)
COLOR UR: YELLOW
CREAT BLD-MCNC: 0.71 MG/DL (ref 0.76–1.27)
GFR SERPL CREATININE-BSD FRML MDRD: 111 ML/MIN/1.73
GLOBULIN UR ELPH-MCNC: 3.2 GM/DL
GLUCOSE BLD-MCNC: 86 MG/DL (ref 65–99)
GLUCOSE UR STRIP-MCNC: NEGATIVE MG/DL
HGB UR QL STRIP.AUTO: NEGATIVE
HYALINE CASTS UR QL AUTO: NORMAL /LPF
KETONES UR QL STRIP: NEGATIVE
LEUKOCYTE ESTERASE UR QL STRIP.AUTO: NEGATIVE
NITRITE UR QL STRIP: NEGATIVE
PH UR STRIP.AUTO: 5.5 [PH] (ref 5–8)
POTASSIUM BLD-SCNC: 4.2 MMOL/L (ref 3.5–5.2)
PROT SERPL-MCNC: 7 G/DL (ref 6–8.5)
PROT UR QL STRIP: NEGATIVE
RBC # UR: NORMAL /HPF
REF LAB TEST METHOD: NORMAL
SODIUM BLD-SCNC: 144 MMOL/L (ref 136–145)
SP GR UR STRIP: 1.01 (ref 1–1.03)
SQUAMOUS #/AREA URNS HPF: NORMAL /HPF
TSH SERPL DL<=0.05 MIU/L-ACNC: 4.5 UIU/ML (ref 0.27–4.2)
UROBILINOGEN UR QL STRIP: NORMAL
WBC UR QL AUTO: NORMAL /HPF

## 2019-09-06 PROCEDURE — 36415 COLL VENOUS BLD VENIPUNCTURE: CPT

## 2019-09-06 PROCEDURE — 93000 ELECTROCARDIOGRAM COMPLETE: CPT | Performed by: NURSE PRACTITIONER

## 2019-09-06 PROCEDURE — 80053 COMPREHEN METABOLIC PANEL: CPT

## 2019-09-06 PROCEDURE — 84443 ASSAY THYROID STIM HORMONE: CPT

## 2019-09-06 PROCEDURE — 99214 OFFICE O/P EST MOD 30 MIN: CPT | Performed by: NURSE PRACTITIONER

## 2019-09-06 PROCEDURE — 81001 URINALYSIS AUTO W/SCOPE: CPT

## 2019-09-06 RX ORDER — NITROGLYCERIN 0.4 MG/1
0.4 TABLET SUBLINGUAL
Qty: 30 TABLET | Refills: 5 | Status: SHIPPED | OUTPATIENT
Start: 2019-09-06

## 2019-09-06 RX ORDER — VALSARTAN 320 MG/1
320 TABLET ORAL DAILY
Refills: 2 | COMMUNITY
Start: 2019-07-08 | End: 2020-01-01 | Stop reason: ALTCHOICE

## 2019-09-06 RX ORDER — CHLORTHALIDONE 25 MG/1
12.5 TABLET ORAL DAILY
Qty: 15 TABLET | Refills: 5 | Status: SHIPPED | OUTPATIENT
Start: 2019-09-06 | End: 2020-02-12

## 2019-09-06 RX ORDER — ALLOPURINOL 300 MG/1
300 TABLET ORAL DAILY
Refills: 11 | COMMUNITY
Start: 2019-08-30 | End: 2021-01-01

## 2019-09-06 RX ORDER — PANTOPRAZOLE SODIUM 40 MG/1
40 TABLET, DELAYED RELEASE ORAL 2 TIMES DAILY
Refills: 11 | COMMUNITY
Start: 2019-07-08

## 2019-09-06 RX ORDER — HYDROCODONE BITARTRATE AND ACETAMINOPHEN 5; 325 MG/1; MG/1
TABLET ORAL
Refills: 0 | COMMUNITY
Start: 2019-06-13

## 2019-09-06 NOTE — PROGRESS NOTES
Date of Office Visit: 2019  Encounter Provider: YDAI Vaz  Place of Service: [facility] CARDIOLOGY  Patient Name: Gabriele Connolly  :1950    Chief Complaint   Patient presents with   • Coronary Artery Disease     6 MTH FOLLOW UP   :     HPI: Gabriele Connolly is a 68 y.o. male, new to me, who presents today for follow-up.  Old records have been obtained and reviewed by me.  He is a patient of Dr. Guillory's with a past cardiac history significant for hypertension and coronary artery disease.  In 2017 he had an inferior STEMI.  Cardiac catheterization revealed a normal left main, mild luminal irregularities in the mid LAD, 20% mid circumflex with normal OM1 and OM 2, and a 99% mid RCA stenosis with an ulcerated plaque and large thrombus burden.  He underwent successful thrombectomy and drug-eluting stent placement to the RCA lesion.  He had an echocardiogram postprocedure which revealed normal LV function with an EF 53% and hypokinesis of the inferior wall with no valvular abnormalities.  He was last seen in the office by Dr. Guillory on 2018 at which time he was doing well with no complaints of angina or heart failure.  He continued to have mild bilateral lower extremity edema that improved with elevation.  Dr. Guillory stopped his Plavix.  No other changes were made to his medical regimen and he was advised to follow-up in 6 months.   Over the past 6 months he has overall been doing well from a cardiac standpoint.  He denies any chest pain, shortness of breath, palpitations, dizziness, or syncope.  Evidently he was diagnosed with esophageal cancer in November of last year.  He also has mets to his liver.  He has been undergoing chemotherapy as well as immunotherapy.  He also reports that he saw an  ([facility] doctor) who started him on several different herbal therapies for a number of ailments.  The patient says he has always had bilateral lower  extremity edema for which she wears compression hose.  However, he has noticed over the past few weeks that his swelling has been much worse.  He has also noticed some purple and red spotting on both of his feet.  He has not been consuming a lot of salt in his diet.    Past Medical History:   Diagnosis Date   • Arthritis    • ASCVD (arteriosclerotic cardiovascular disease)    • Cancer (CMS/[facility])    • Cerebral infarct (CMS/[facility])    • Coronary artery disease    • Gout    • Hypertension    • Pneumonia    • ST elevation myocardial infarction (STEMI) (CMS/[facility]) 10/25/2017   • Stroke (CMS/[facility])    • TIA (transient ischemic attack)        Past Surgical History:   Procedure Laterality Date   • ANKLE SURGERY Left    • CARDIAC CATHETERIZATION N/A 10/25/2017    Procedure: Left Heart Cath;  Surgeon: Contreras Guillory MD;  Location: [facility] CATH INVASIVE LOCATION;  Service:    • CARDIAC CATHETERIZATION N/A 10/25/2017    Procedure: Stent LUISITO coronary;  Surgeon: Contreras Guillory MD;  Location: [facility] CATH INVASIVE LOCATION;  Service:    • CARDIAC CATHETERIZATION N/A 10/25/2017    Procedure: Left ventriculography;  Surgeon: Contreras Guillory MD;  Location: [facility] CATH INVASIVE LOCATION;  Service:    • CARDIAC CATHETERIZATION N/A 10/25/2017    Procedure: Coronary angiography;  Surgeon: Contreras Guillory MD;  Location: [facility] CATH INVASIVE LOCATION;  Service:    • CARDIAC CATHETERIZATION  10/25/2017    Procedure: Percutaneous Manual Thrombectomy;  Surgeon: Contreras Guillory MD;  Location: [facility] CATH INVASIVE LOCATION;  Service:    • CORONARY ANGIOPLASTY     • CORONARY STENT PLACEMENT     • JOINT REPLACEMENT      knee   • MOLE REMOVAL     • REPLACEMENT TOTAL KNEE BILATERAL         Social History     Socioeconomic History   • Marital status:      Spouse name: Not on file   • Number of children: Not on file   • Years of education: Not on file   • Highest education level: Not on file   Tobacco Use   • Smoking  status: Former Smoker     Types: Cigarettes     Last attempt to quit:      Years since quittin.6   • Smokeless tobacco: Never Used   • Tobacco comment: CAFFEINE USE-2 CUPS COFFEE DAILY -1/2 CAFF   Substance and Sexual Activity   • Alcohol use: No   • Drug use: No   • Sexual activity: Defer       Family History   Problem Relation Age of Onset   • Hypertension Mother    • Cancer Mother    • Hypertension Father    • Arthritis Father    • No Known Problems Maternal Grandmother    • No Known Problems Maternal Grandfather    • No Known Problems Paternal Grandmother    • No Known Problems Paternal Grandfather        Review of Systems   Constitution: Positive for malaise/fatigue. Negative for chills and fever.   Cardiovascular: Positive for leg swelling. Negative for chest pain, dyspnea on exertion, near-syncope, orthopnea, palpitations, paroxysmal nocturnal dyspnea and syncope.   Respiratory: Negative for cough and shortness of breath.    Musculoskeletal: Negative for joint pain, joint swelling and myalgias.   Gastrointestinal: Negative for abdominal pain, diarrhea, melena, nausea and vomiting.   Genitourinary: Negative for frequency and hematuria.   Neurological: Positive for numbness and paresthesias. Negative for light-headedness and seizures.   Allergic/Immunologic: Negative.    All other systems reviewed and are negative.      No Known Allergies      Current Outpatient Medications:   •  allopurinol (ZYLOPRIM) 300 MG tablet, Take 300 mg by mouth Daily., Disp: , Rfl: 11  •  aspirin 81 MG EC tablet, Take 81 mg by mouth Daily., Disp: , Rfl:   •  Coenzyme Q10 (COQ10) 100 MG capsule, Take 100 mg by mouth Daily., Disp: , Rfl:   •  FEROSUL 325 (65 Fe) MG tablet, Take 1 tablet by mouth 2 (Two) Times a Day., Disp: , Rfl: 11  •  folic acid (FOLVITE) 1 MG tablet, Take 1 mg by mouth Daily., Disp: , Rfl:   •  furosemide (LASIX) 40 MG tablet, Take 1 tablet by mouth Daily., Disp: 90 tablet, Rfl: 1  •  glucosamine-chondroitin  "500-400 MG capsule capsule, Take 1 capsule by mouth 2 (Two) Times a Day With Meals., Disp: , Rfl:   •  HYDROcodone-acetaminophen (NORCO) 5-325 MG per tablet, TK 1 T PO  Q 6 H PRN P, Disp: , Rfl: 0  •  methotrexate 2.5 MG tablet, Take 10 mg by mouth 1 (One) Time Per Week. Patient takes four 2.5 mg tablets once a week on mondays per patient and patient's wife. Written list of medications states \"2.5 mg four per week\", Disp: , Rfl:   •  metoprolol tartrate (LOPRESSOR) 25 MG tablet, TAKE 1 TABLET BY MOUTH EVERY 12 HOURS, Disp: 60 tablet, Rfl: 0  •  pantoprazole (PROTONIX) 40 MG EC tablet, Take 40 mg by mouth 2 (Two) Times a Day., Disp: , Rfl: 11  •  pravastatin (PRAVACHOL) 80 MG tablet, TAKE 1 TABLET BY MOUTH DAILY, Disp: 90 tablet, Rfl: 0  •  predniSONE (DELTASONE) 1 MG tablet, Take 1 mg by mouth 3 (Three) Times a Day., Disp: , Rfl:   •  valsartan (DIOVAN) 320 MG tablet, Take 320 mg by mouth Daily., Disp: , Rfl: 2  •  chlorthalidone (HYGROTON) 25 MG tablet, Take 0.5 tablets by mouth Daily., Disp: 15 tablet, Rfl: 5  •  nitroglycerin (NITROSTAT) 0.4 MG SL tablet, Place 1 tablet under the tongue Every 5 (Five) Minutes As Needed for Chest Pain. Take no more than 3 doses in 15 minutes., Disp: 30 tablet, Rfl: 5      Objective:     Vitals:    09/06/19 1318 09/06/19 1333 09/06/19 1337   BP: 150/92 150/90 132/88   BP Location: Right arm Left arm Right arm   Patient Position: Sitting Sitting Lying   Pulse: 74     Resp: 18     SpO2: 97%     Weight: 114 kg (252 lb)     Height: 180.3 cm (71\")       Body mass index is 35.15 kg/m².    PHYSICAL EXAM:    Physical Exam   Constitutional: He is oriented to person, place, and time. He appears well-developed and well-nourished. No distress.   HENT:   Head: Normocephalic and atraumatic.   Eyes: Pupils are equal, round, and reactive to light.   Neck: No JVD present. No thyromegaly present.   Cardiovascular: Normal rate, regular rhythm, normal heart sounds and intact distal pulses.   No murmur " heard.  Pulmonary/Chest: Effort normal and breath sounds normal. No respiratory distress.   Abdominal: Soft. Bowel sounds are normal. He exhibits no distension. There is no splenomegaly or hepatomegaly. There is no tenderness.   Musculoskeletal: Normal range of motion. He exhibits edema.   +3 bilateral extremity edema   Neurological: He is alert and oriented to person, place, and time.   Skin: Skin is warm and dry. He is not diaphoretic. No erythema.   Psychiatric: He has a normal mood and affect. His behavior is normal. Judgment normal.         ECG 12 Lead  Date/Time: 9/6/2019 1:40 PM  Performed by: Reyna Gamez APRN  Authorized by: Reyna Gamez APRN   Comparison: compared with previous ECG from 12/7/2018  Similar to previous ECG  Rhythm: sinus rhythm  Rate: normal  BPM: 63  Q waves: III    T inversion: III    Clinical impression: abnormal EKG  Comments: Indication: CAD              Assessment:       Diagnosis Plan   1. Coronary artery disease involving native coronary artery of native heart without angina pectoris  ECG 12 Lead   2. Mixed hyperlipidemia     3. Essential hypertension     4. Lower extremity edema  Comprehensive Metabolic Panel    TSH    Urinalysis With Microscopic - Urine, Clean Catch     Orders Placed This Encounter   Procedures   • Comprehensive Metabolic Panel     Standing Status:   Future     Number of Occurrences:   1     Standing Expiration Date:   9/6/2020   • TSH     Standing Status:   Future     Number of Occurrences:   1     Standing Expiration Date:   9/6/2020   • ECG 12 Lead     This order was created via procedure documentation   • Urinalysis With Microscopic - Urine, Clean Catch     Standing Status:   Future     Number of Occurrences:   1     Standing Expiration Date:   9/6/2020          Plan:       1.  Coronary artery disease.  He denies any symptoms of angina.  He remains on good medical therapy.      2.  Lower extremity edema.  I think his lower extremity edema is  multifactorial.  I think it is combination of venous insufficiency as well as a side effect from the amlodipine.  I am going to stop his amlodipine and start him on chlorthalidone 25 mg daily.  He will need a BMP in 10 days which he is going to have completed at [facility] in [facility].  Additionally, I am going to check a CMP, TSH, and UA.      3.  Hypertension.  His blood pressure today is elevated.  I am starting him on chlorthalidone which I think will help with both his edema and his blood pressure.      Overall I think he is stable from a cardiac standpoint.  He denies any symptoms of angina or heart failure.  I discussed the plan of care with Dr. Renee, who also assessed the patient, and he is in agreement with the above plan.  Further recommendations will be made pending these results.      As always, it has been a pleasure to participate in your patient's care.      Sincerely,         YADI Richardson

## 2019-09-10 ENCOUNTER — TELEPHONE (OUTPATIENT)
Dept: CARDIOLOGY | Facility: CLINIC | Age: 69
End: 2019-09-10

## 2019-09-10 DIAGNOSIS — R60.0 LOWER EXTREMITY EDEMA: Primary | ICD-10-CM

## 2019-09-10 NOTE — TELEPHONE ENCOUNTER
----- Message from YADI Martinez sent at 9/6/2019  2:06 PM EDT -----  Please fax an order for a BMP in 10 days to [facility] in [facility].

## 2019-09-10 NOTE — TELEPHONE ENCOUNTER
BMP lab orders faxed to [facility].  F: [phone]  Confirmation Received.    ELYSIA Romero

## 2019-09-10 NOTE — TELEPHONE ENCOUNTER
You asked I fax an order for a BMP in 10 days to [facility], but you didn't put an order in. If you put order in, I did get fax number to fax one over.  Please advise.    ELYSIA Romero

## 2019-09-11 ENCOUNTER — TELEPHONE (OUTPATIENT)
Dept: CARDIOLOGY | Facility: CLINIC | Age: 69
End: 2019-09-11

## 2019-09-11 LAB
BASOPHILS # BLD AUTO: 0.08 10*3/UL (ref 0–0.2)
BASOPHILS NFR BLD AUTO: 0.6 % (ref 0–3)
CONV ABS IMM GRAN: 0.11 10*3/UL (ref 0–0.54)
CONV EOSINOPHILS PERCENT BY MANUAL COUNT: 2.3 % (ref 0–7)
CONV IMMATURE GRAN: 0.8 % (ref 0–0.4)
EOSINOPHIL # BLD MANUAL: 0.33 10*3/UL (ref 0–0.7)
ERYTHROCYTE [DISTWIDTH] IN BLOOD BY AUTOMATED COUNT: 19.3 % (ref 11.5–14.5)
ERYTHROCYTE [DISTWIDTH] IN BLOOD BY AUTOMATED COUNT: 58.6 FL
HBA1C MFR BLD: 12.3 G/DL (ref 14–18)
HCT VFR BLD AUTO: 40.1 % (ref 42–52)
LYMPHOCYTES # BLD AUTO: 1.58 10*3/UL (ref 1–5)
LYMPHOCYTES NFR BLD AUTO: 11.2 % (ref 20–45)
MCH RBC QN AUTO: 26.6 PG (ref 27–31)
MCHC RBC AUTO-ENTMCNC: 30.7 G/DL (ref 33–37)
MCV RBC AUTO: 86.8 FL (ref 80–96)
MONOCYTES # BLD AUTO: 0.52 10*3/UL (ref 0.2–1.2)
MONOCYTES NFR BLD MANUAL: 3.7 % (ref 3–10)
NEUTROPHILS # BLD AUTO: 11.52 10*3/UL (ref 2–8)
NEUTROPHILS NFR BLD MANUAL: 81.4 % (ref 30–85)
PLATELET # BLD AUTO: 390 10*3/UL (ref 130–400)
PMV BLD AUTO: 10.3 FL (ref 7.4–10.4)
RBC MORPH BLD: 4.62 10*6/UL (ref 4.7–6.1)
WBC # BLD AUTO: 14.14 10*3/UL (ref 4.8–10.8)

## 2019-09-11 NOTE — TELEPHONE ENCOUNTER
----- Message from YADI Martinez sent at 9/6/2019  3:57 PM EDT -----  Please call the patient and let him know his labs are stable.

## 2019-09-18 LAB
BASOPHILS # BLD AUTO: 0.05 10*3/UL (ref 0–0.2)
BASOPHILS NFR BLD AUTO: 0.6 % (ref 0–3)
CONV ABS IMM GRAN: 0.06 10*3/UL (ref 0–0.54)
CONV EOSINOPHILS PERCENT BY MANUAL COUNT: 2 % (ref 0–7)
CONV IMMATURE GRAN: 0.7 % (ref 0–0.4)
EOSINOPHIL # BLD MANUAL: 0.18 10*3/UL (ref 0–0.7)
ERYTHROCYTE [DISTWIDTH] IN BLOOD BY AUTOMATED COUNT: 20.3 % (ref 11.5–14.5)
ERYTHROCYTE [DISTWIDTH] IN BLOOD BY AUTOMATED COUNT: 60.2 FL
HBA1C MFR BLD: 12.7 G/DL (ref 14–18)
HCT VFR BLD AUTO: 41.9 % (ref 42–52)
LYMPHOCYTES # BLD AUTO: 1.53 10*3/UL (ref 1–5)
LYMPHOCYTES NFR BLD AUTO: 16.9 % (ref 20–45)
MCH RBC QN AUTO: 26.6 PG (ref 27–31)
MCHC RBC AUTO-ENTMCNC: 30.3 G/DL (ref 33–37)
MCV RBC AUTO: 87.8 FL (ref 80–96)
MONOCYTES # BLD AUTO: 0.41 10*3/UL (ref 0.2–1.2)
MONOCYTES NFR BLD MANUAL: 4.5 % (ref 3–10)
NEUTROPHILS # BLD AUTO: 6.82 10*3/UL (ref 2–8)
NEUTROPHILS NFR BLD MANUAL: 75.3 % (ref 30–85)
PLATELET # BLD AUTO: 389 10*3/UL (ref 130–400)
PMV BLD AUTO: 10.6 FL (ref 7.4–10.4)
RBC MORPH BLD: 4.77 10*6/UL (ref 4.7–6.1)
WBC # BLD AUTO: 9.05 10*3/UL (ref 4.8–10.8)

## 2019-09-30 ENCOUNTER — HOSPITAL ENCOUNTER (OUTPATIENT)
Dept: CT IMAGING | Facility: HOSPITAL | Age: 69
Discharge: HOME | End: 2019-09-30
Attending: NURSE PRACTITIONER

## 2019-10-02 ENCOUNTER — OFFICE VISIT CONVERTED (OUTPATIENT)
Dept: ONCOLOGY | Facility: HOSPITAL | Age: 69
End: 2019-10-02
Attending: INTERNAL MEDICINE

## 2019-10-02 ENCOUNTER — HOSPITAL ENCOUNTER (OUTPATIENT)
Dept: OTHER | Facility: HOSPITAL | Age: 69
Setting detail: RECURRING SERIES
Discharge: HOME | End: 2019-10-31
Attending: INTERNAL MEDICINE

## 2019-10-02 LAB
ALBUMIN SERPL-MCNC: 3.6 G/DL (ref 3.5–5)
ALBUMIN/GLOB SERPL: 1.1 {RATIO} (ref 1.4–2.6)
ALP SERPL-CCNC: 85 U/L (ref 56–155)
ALT SERPL-CCNC: 11 U/L (ref 10–40)
ANION GAP SERPL CALC-SCNC: 14 MMOL/L (ref 8–19)
APPEARANCE UR: CLEAR
AST SERPL-CCNC: 13 U/L (ref 15–50)
BASOPHILS # BLD AUTO: 0.08 10*3/UL (ref 0–0.2)
BASOPHILS NFR BLD AUTO: 0.7 % (ref 0–3)
BILIRUB SERPL-MCNC: 0.56 MG/DL (ref 0.2–1.3)
BILIRUB UR QL: NEGATIVE
BUN SERPL-MCNC: 18 MG/DL (ref 5–25)
BUN/CREAT SERPL: 21 {RATIO} (ref 6–20)
CALCIUM SERPL-MCNC: 9.5 MG/DL (ref 8.7–10.4)
CHLORIDE SERPL-SCNC: 102 MMOL/L (ref 99–111)
COLOR UR: YELLOW
CONV ABS IMM GRAN: 0.06 10*3/UL (ref 0–0.2)
CONV ANISOCYTES: NORMAL
CONV CO2: 29 MMOL/L (ref 22–32)
CONV COLLECTION SOURCE (UA): NORMAL
CONV IMMATURE GRAN: 0.5 % (ref 0–1.8)
CONV TOTAL PROTEIN: 6.8 G/DL (ref 6.3–8.2)
CONV UROBILINOGEN IN URINE BY AUTOMATED TEST STRIP: 0.2 {EHRLICHU}/DL (ref 0.1–1)
CREAT UR-MCNC: 0.84 MG/DL (ref 0.7–1.2)
DEPRECATED RDW RBC AUTO: 69.6 FL (ref 35.1–43.9)
EOSINOPHIL # BLD AUTO: 0.11 10*3/UL (ref 0–0.7)
EOSINOPHIL # BLD AUTO: 1 % (ref 0–7)
ERYTHROCYTE [DISTWIDTH] IN BLOOD BY AUTOMATED COUNT: 22.3 % (ref 11.6–14.4)
GFR SERPLBLD BASED ON 1.73 SQ M-ARVRAT: >60 ML/MIN/{1.73_M2}
GLOBULIN UR ELPH-MCNC: 3.2 G/DL (ref 2–3.5)
GLUCOSE SERPL-MCNC: 86 MG/DL (ref 70–99)
GLUCOSE UR QL: NEGATIVE MG/DL
HCT VFR BLD AUTO: 41.2 % (ref 42–52)
HGB BLD-MCNC: 12.7 G/DL (ref 14–18)
HGB UR QL STRIP: NEGATIVE
KETONES UR QL STRIP: NEGATIVE MG/DL
LEUKOCYTE ESTERASE UR QL STRIP: NEGATIVE
LYMPHOCYTES # BLD AUTO: 1.88 10*3/UL (ref 1–5)
LYMPHOCYTES NFR BLD AUTO: 16.9 % (ref 20–45)
MACROCYTES BLD QL SMEAR: NORMAL
MCH RBC QN AUTO: 27.8 PG (ref 27–31)
MCHC RBC AUTO-ENTMCNC: 30.8 G/DL (ref 33–37)
MCV RBC AUTO: 90.2 FL (ref 80–96)
MONOCYTES # BLD AUTO: 1.06 10*3/UL (ref 0.2–1.2)
MONOCYTES NFR BLD AUTO: 9.5 % (ref 3–10)
NEUTROPHILS # BLD AUTO: 7.91 10*3/UL (ref 2–8)
NEUTROPHILS NFR BLD AUTO: 71.4 % (ref 30–85)
NITRITE UR QL STRIP: NEGATIVE
NRBC CBCN: 0 % (ref 0–0.7)
OSMOLALITY SERPL CALC.SUM OF ELEC: 295 MOSM/KG (ref 273–304)
PH UR STRIP.AUTO: 7.5 [PH] (ref 5–8)
PLATELET # BLD AUTO: 313 10*3/UL (ref 130–400)
PMV BLD AUTO: 10.7 FL (ref 9.4–12.4)
POTASSIUM SERPL-SCNC: 3.1 MMOL/L (ref 3.5–5.3)
PROT UR QL: NEGATIVE MG/DL
RBC # BLD AUTO: 4.57 10*6/UL (ref 4.7–6.1)
SODIUM SERPL-SCNC: 142 MMOL/L (ref 135–147)
SP GR UR: 1.01 (ref 1–1.03)
SPHEROCYTES BLD QL SMEAR: SLIGHT
T4 FREE SERPL-MCNC: 1.2 NG/DL (ref 0.9–1.8)
TSH SERPL-ACNC: 5.95 M[IU]/L (ref 0.27–4.2)
WBC # BLD AUTO: 11.1 10*3/UL (ref 4.8–10.8)

## 2019-10-09 LAB
BASOPHILS # BLD AUTO: 0.04 10*3/UL (ref 0–0.2)
BASOPHILS NFR BLD AUTO: 0.3 % (ref 0–3)
CONV ABS IMM GRAN: 0.11 10*3/UL (ref 0–0.54)
CONV EOSINOPHILS PERCENT BY MANUAL COUNT: 1.4 % (ref 0–7)
CONV IMMATURE GRAN: 0.9 % (ref 0–0.4)
EOSINOPHIL # BLD MANUAL: 0.16 10*3/UL (ref 0–0.7)
ERYTHROCYTE [DISTWIDTH] IN BLOOD BY AUTOMATED COUNT: 22.1 % (ref 11.5–14.5)
ERYTHROCYTE [DISTWIDTH] IN BLOOD BY AUTOMATED COUNT: 70.6 FL
HBA1C MFR BLD: 12.4 G/DL (ref 14–18)
HCT VFR BLD AUTO: 39.8 % (ref 42–52)
LYMPHOCYTES # BLD AUTO: 1.62 10*3/UL (ref 1–5)
LYMPHOCYTES NFR BLD AUTO: 13.8 % (ref 20–45)
MCH RBC QN AUTO: 28.2 PG (ref 27–31)
MCHC RBC AUTO-ENTMCNC: 31.2 G/DL (ref 33–37)
MCV RBC AUTO: 90.7 FL (ref 80–96)
MONOCYTES # BLD AUTO: 0.45 10*3/UL (ref 0.2–1.2)
MONOCYTES NFR BLD MANUAL: 3.8 % (ref 3–10)
NEUTROPHILS # BLD AUTO: 9.32 10*3/UL (ref 2–8)
NEUTROPHILS NFR BLD MANUAL: 79.8 % (ref 30–85)
PLATELET # BLD AUTO: 326 10*3/UL (ref 130–400)
PMV BLD AUTO: 10.6 FL (ref 7.4–10.4)
RBC MORPH BLD: 4.39 10*6/UL (ref 4.7–6.1)
WBC # BLD AUTO: 11.7 10*3/UL (ref 4.8–10.8)

## 2019-10-16 LAB
BASOPHILS # BLD AUTO: 0.02 10*3/UL (ref 0–0.2)
BASOPHILS NFR BLD AUTO: 0.3 % (ref 0–3)
CONV ABS IMM GRAN: 0.05 10*3/UL (ref 0–0.54)
CONV EOSINOPHILS PERCENT BY MANUAL COUNT: 2.3 % (ref 0–7)
CONV IMMATURE GRAN: 0.7 % (ref 0–0.4)
EOSINOPHIL # BLD MANUAL: 0.17 10*3/UL (ref 0–0.7)
ERYTHROCYTE [DISTWIDTH] IN BLOOD BY AUTOMATED COUNT: 22.2 % (ref 11.5–14.5)
ERYTHROCYTE [DISTWIDTH] IN BLOOD BY AUTOMATED COUNT: 72.3 FL
HBA1C MFR BLD: 12.1 G/DL (ref 14–18)
HCT VFR BLD AUTO: 39.1 % (ref 42–52)
LYMPHOCYTES # BLD AUTO: 1.39 10*3/UL (ref 1–5)
LYMPHOCYTES NFR BLD AUTO: 18.9 % (ref 20–45)
MCH RBC QN AUTO: 28.4 PG (ref 27–31)
MCHC RBC AUTO-ENTMCNC: 30.9 G/DL (ref 33–37)
MCV RBC AUTO: 91.8 FL (ref 80–96)
MONOCYTES # BLD AUTO: 0.29 10*3/UL (ref 0.2–1.2)
MONOCYTES NFR BLD MANUAL: 3.9 % (ref 3–10)
NEUTROPHILS # BLD AUTO: 5.43 10*3/UL (ref 2–8)
NEUTROPHILS NFR BLD MANUAL: 73.9 % (ref 30–85)
PLATELET # BLD AUTO: 289 10*3/UL (ref 130–400)
PMV BLD AUTO: 10.6 FL (ref 7.4–10.4)
RBC MORPH BLD: 4.26 10*6/UL (ref 4.7–6.1)
WBC # BLD AUTO: 7.35 10*3/UL (ref 4.8–10.8)

## 2019-10-30 LAB
ALBUMIN SERPL-MCNC: 3.9 G/DL (ref 3.5–5)
ALBUMIN/GLOB SERPL: 1.4 {RATIO} (ref 1.4–2.6)
ALP SERPL-CCNC: 88 U/L (ref 56–155)
ALT SERPL-CCNC: 13 U/L (ref 10–40)
ANION GAP SERPL CALC-SCNC: 16 MMOL/L (ref 8–19)
AST SERPL-CCNC: 17 U/L (ref 15–50)
BASOPHILS # BLD AUTO: 0.05 10*3/UL (ref 0–0.2)
BASOPHILS NFR BLD AUTO: 0.5 % (ref 0–3)
BILIRUB SERPL-MCNC: 0.53 MG/DL (ref 0.2–1.3)
BUN SERPL-MCNC: 17 MG/DL (ref 5–25)
BUN/CREAT SERPL: 26 {RATIO} (ref 6–20)
CALCIUM SERPL-MCNC: 9.3 MG/DL (ref 8.7–10.4)
CHLORIDE SERPL-SCNC: 102 MMOL/L (ref 99–111)
CONV ABS IMM GRAN: 0.06 10*3/UL (ref 0–0.54)
CONV CO2: 28 MMOL/L (ref 22–32)
CONV EOSINOPHILS PERCENT BY MANUAL COUNT: 0.8 % (ref 0–7)
CONV IMMATURE GRAN: 0.7 % (ref 0–0.4)
CONV TOTAL PROTEIN: 6.6 G/DL (ref 6.3–8.2)
CREAT UR-MCNC: 0.65 MG/DL (ref 0.7–1.2)
EOSINOPHIL # BLD MANUAL: 0.07 10*3/UL (ref 0–0.7)
ERYTHROCYTE [DISTWIDTH] IN BLOOD BY AUTOMATED COUNT: 23.5 % (ref 11.5–14.5)
ERYTHROCYTE [DISTWIDTH] IN BLOOD BY AUTOMATED COUNT: 80.7 FL
GFR SERPLBLD BASED ON 1.73 SQ M-ARVRAT: >60 ML/MIN/{1.73_M2}
GLOBULIN UR ELPH-MCNC: 2.7 G/DL (ref 2–3.5)
GLUCOSE SERPL-MCNC: 86 MG/DL (ref 70–99)
HBA1C MFR BLD: 12.4 G/DL (ref 14–18)
HCT VFR BLD AUTO: 40.3 % (ref 42–52)
LYMPHOCYTES # BLD AUTO: 2.13 10*3/UL (ref 1–5)
LYMPHOCYTES NFR BLD AUTO: 23.4 % (ref 20–45)
MCH RBC QN AUTO: 29.3 PG (ref 27–31)
MCHC RBC AUTO-ENTMCNC: 30.8 G/DL (ref 33–37)
MCV RBC AUTO: 95.3 FL (ref 80–96)
MONOCYTES # BLD AUTO: 0.99 10*3/UL (ref 0.2–1.2)
MONOCYTES NFR BLD MANUAL: 10.9 % (ref 3–10)
NEUTROPHILS # BLD AUTO: 5.8 10*3/UL (ref 2–8)
NEUTROPHILS NFR BLD MANUAL: 63.7 % (ref 30–85)
OSMOLALITY SERPL CALC.SUM OF ELEC: 295 MOSM/KG (ref 273–304)
PLATELET # BLD AUTO: 270 10*3/UL (ref 130–400)
PMV BLD AUTO: 10.1 FL (ref 7.4–10.4)
POTASSIUM SERPL-SCNC: 3.5 MMOL/L (ref 3.5–5.3)
RBC MORPH BLD: 4.23 10*6/UL (ref 4.7–6.1)
SODIUM SERPL-SCNC: 142 MMOL/L (ref 135–147)
T4 FREE SERPL-MCNC: 1.2 NG/DL (ref 0.9–1.8)
TSH SERPL-ACNC: 3.67 M[IU]/L (ref 0.27–4.2)
WBC # BLD AUTO: 9.1 10*3/UL (ref 4.8–10.8)

## 2019-11-06 ENCOUNTER — HOSPITAL ENCOUNTER (OUTPATIENT)
Dept: OTHER | Facility: HOSPITAL | Age: 69
Setting detail: RECURRING SERIES
Discharge: HOME | End: 2019-11-30
Attending: INTERNAL MEDICINE

## 2019-11-06 ENCOUNTER — OFFICE VISIT CONVERTED (OUTPATIENT)
Dept: ONCOLOGY | Facility: HOSPITAL | Age: 69
End: 2019-11-06
Attending: INTERNAL MEDICINE

## 2019-11-06 LAB
APPEARANCE UR: CLEAR
BASOPHILS # BLD AUTO: 0.02 10*3/UL (ref 0–0.2)
BASOPHILS NFR BLD AUTO: 0.2 % (ref 0–3)
BILIRUB UR QL: NEGATIVE
COLOR UR: YELLOW
CONV ABS IMM GRAN: 0.08 10*3/UL (ref 0–0.54)
CONV COLLECTION SOURCE (UA): NORMAL
CONV EOSINOPHILS PERCENT BY MANUAL COUNT: 1.1 % (ref 0–7)
CONV IMMATURE GRAN: 0.7 % (ref 0–0.4)
CONV UROBILINOGEN IN URINE BY AUTOMATED TEST STRIP: 0.2 {EHRLICHU}/DL (ref 0.1–1)
EOSINOPHIL # BLD MANUAL: 0.12 10*3/UL (ref 0–0.7)
ERYTHROCYTE [DISTWIDTH] IN BLOOD BY AUTOMATED COUNT: 22.8 % (ref 11.5–14.5)
ERYTHROCYTE [DISTWIDTH] IN BLOOD BY AUTOMATED COUNT: 79.4 FL
GLUCOSE UR QL: NEGATIVE MG/DL
HBA1C MFR BLD: 11.7 G/DL (ref 14–18)
HCT VFR BLD AUTO: 37.2 % (ref 42–52)
HGB UR QL STRIP: NEGATIVE
KETONES UR QL STRIP: NEGATIVE MG/DL
LEUKOCYTE ESTERASE UR QL STRIP: NEGATIVE
LYMPHOCYTES # BLD AUTO: 1.63 10*3/UL (ref 1–5)
LYMPHOCYTES NFR BLD AUTO: 14.6 % (ref 20–45)
MCH RBC QN AUTO: 30.1 PG (ref 27–31)
MCHC RBC AUTO-ENTMCNC: 31.5 G/DL (ref 33–37)
MCV RBC AUTO: 95.6 FL (ref 80–96)
MONOCYTES # BLD AUTO: 0.41 10*3/UL (ref 0.2–1.2)
MONOCYTES NFR BLD MANUAL: 3.7 % (ref 3–10)
NEUTROPHILS # BLD AUTO: 8.91 10*3/UL (ref 2–8)
NEUTROPHILS NFR BLD MANUAL: 79.7 % (ref 30–85)
NITRITE UR QL STRIP: NEGATIVE
PH UR STRIP.AUTO: 6.5 [PH] (ref 5–8)
PLATELET # BLD AUTO: 265 10*3/UL (ref 130–400)
PMV BLD AUTO: 10.1 FL (ref 7.4–10.4)
PROT UR QL: NEGATIVE MG/DL
RBC MORPH BLD: 3.89 10*6/UL (ref 4.7–6.1)
SP GR UR: 1.01 (ref 1–1.03)
WBC # BLD AUTO: 11.17 10*3/UL (ref 4.8–10.8)

## 2019-11-13 LAB
BASOPHILS # BLD AUTO: 0.05 10*3/UL (ref 0–0.2)
BASOPHILS NFR BLD AUTO: 0.8 % (ref 0–3)
CONV ABS IMM GRAN: 0.06 10*3/UL (ref 0–0.2)
CONV IMMATURE GRAN: 0.9 % (ref 0–1.8)
DEPRECATED RDW RBC AUTO: 81.8 FL (ref 35.1–43.9)
EOSINOPHIL # BLD AUTO: 0.15 10*3/UL (ref 0–0.7)
EOSINOPHIL # BLD AUTO: 2.4 % (ref 0–7)
ERYTHROCYTE [DISTWIDTH] IN BLOOD BY AUTOMATED COUNT: 23.1 % (ref 11.6–14.4)
HCT VFR BLD AUTO: 39.6 % (ref 42–52)
HGB BLD-MCNC: 12.4 G/DL (ref 14–18)
LYMPHOCYTES # BLD AUTO: 1.43 10*3/UL (ref 1–5)
LYMPHOCYTES NFR BLD AUTO: 22.6 % (ref 20–45)
MCH RBC QN AUTO: 30.5 PG (ref 27–31)
MCHC RBC AUTO-ENTMCNC: 31.3 G/DL (ref 33–37)
MCV RBC AUTO: 97.5 FL (ref 80–96)
MONOCYTES # BLD AUTO: 0.32 10*3/UL (ref 0.2–1.2)
MONOCYTES NFR BLD AUTO: 5.1 % (ref 3–10)
NEUTROPHILS # BLD AUTO: 4.31 10*3/UL (ref 2–8)
NEUTROPHILS NFR BLD AUTO: 68.2 % (ref 30–85)
NRBC CBCN: 0.3 % (ref 0–0.7)
PLATELET # BLD AUTO: 286 10*3/UL (ref 130–400)
PMV BLD AUTO: 11.2 FL (ref 9.4–12.4)
RBC # BLD AUTO: 4.06 10*6/UL (ref 4.7–6.1)
WBC # BLD AUTO: 6.32 10*3/UL (ref 4.8–10.8)

## 2019-11-26 ENCOUNTER — HOSPITAL ENCOUNTER (OUTPATIENT)
Dept: CT IMAGING | Facility: HOSPITAL | Age: 69
Discharge: HOME | End: 2019-11-26
Attending: INTERNAL MEDICINE

## 2019-11-27 ENCOUNTER — OFFICE VISIT CONVERTED (OUTPATIENT)
Dept: ONCOLOGY | Facility: HOSPITAL | Age: 69
End: 2019-11-27
Attending: INTERNAL MEDICINE

## 2019-11-27 LAB
ALBUMIN SERPL-MCNC: 4 G/DL (ref 3.5–5)
ALBUMIN/GLOB SERPL: 1.3 {RATIO} (ref 1.4–2.6)
ALP SERPL-CCNC: 90 U/L (ref 56–155)
ALT SERPL-CCNC: 13 U/L (ref 10–40)
ANION GAP SERPL CALC-SCNC: 18 MMOL/L (ref 8–19)
APPEARANCE UR: CLEAR
AST SERPL-CCNC: 18 U/L (ref 15–50)
BASOPHILS # BLD AUTO: 0.04 10*3/UL (ref 0–0.2)
BASOPHILS NFR BLD AUTO: 0.4 % (ref 0–3)
BILIRUB SERPL-MCNC: 0.5 MG/DL (ref 0.2–1.3)
BILIRUB UR QL: NEGATIVE
BUN SERPL-MCNC: 20 MG/DL (ref 5–25)
BUN/CREAT SERPL: 21 {RATIO} (ref 6–20)
CALCIUM SERPL-MCNC: 9.6 MG/DL (ref 8.7–10.4)
CHLORIDE SERPL-SCNC: 102 MMOL/L (ref 99–111)
COLOR UR: YELLOW
CONV ABS IMM GRAN: 0.09 10*3/UL (ref 0–0.54)
CONV CO2: 26 MMOL/L (ref 22–32)
CONV COLLECTION SOURCE (UA): NORMAL
CONV EOSINOPHILS PERCENT BY MANUAL COUNT: 0.7 % (ref 0–7)
CONV IMMATURE GRAN: 1 % (ref 0–0.4)
CONV TOTAL PROTEIN: 7.1 G/DL (ref 6.3–8.2)
CONV UROBILINOGEN IN URINE BY AUTOMATED TEST STRIP: 0.2 {EHRLICHU}/DL (ref 0.1–1)
CREAT UR-MCNC: 0.97 MG/DL (ref 0.7–1.2)
EOSINOPHIL # BLD MANUAL: 0.06 10*3/UL (ref 0–0.7)
ERYTHROCYTE [DISTWIDTH] IN BLOOD BY AUTOMATED COUNT: 22.7 % (ref 11.5–14.5)
ERYTHROCYTE [DISTWIDTH] IN BLOOD BY AUTOMATED COUNT: 84.2 FL
GFR SERPLBLD BASED ON 1.73 SQ M-ARVRAT: >60 ML/MIN/{1.73_M2}
GLOBULIN UR ELPH-MCNC: 3.1 G/DL (ref 2–3.5)
GLUCOSE SERPL-MCNC: 95 MG/DL (ref 70–99)
GLUCOSE UR QL: NEGATIVE MG/DL
HBA1C MFR BLD: 13.4 G/DL (ref 14–18)
HCT VFR BLD AUTO: 41.7 % (ref 42–52)
HGB UR QL STRIP: NEGATIVE
KETONES UR QL STRIP: NEGATIVE MG/DL
LEUKOCYTE ESTERASE UR QL STRIP: NEGATIVE
LYMPHOCYTES # BLD AUTO: 1.62 10*3/UL (ref 1–5)
LYMPHOCYTES NFR BLD AUTO: 18.1 % (ref 20–45)
MCH RBC QN AUTO: 32.3 PG (ref 27–31)
MCHC RBC AUTO-ENTMCNC: 32.1 G/DL (ref 33–37)
MCV RBC AUTO: 100.5 FL (ref 80–96)
MONOCYTES # BLD AUTO: 1.13 10*3/UL (ref 0.2–1.2)
MONOCYTES NFR BLD MANUAL: 12.6 % (ref 3–10)
NEUTROPHILS # BLD AUTO: 6.03 10*3/UL (ref 2–8)
NEUTROPHILS NFR BLD MANUAL: 67.2 % (ref 30–85)
NITRITE UR QL STRIP: NEGATIVE
OSMOLALITY SERPL CALC.SUM OF ELEC: 296 MOSM/KG (ref 273–304)
PH UR STRIP.AUTO: 7 [PH] (ref 5–8)
PLATELET # BLD AUTO: 308 10*3/UL (ref 130–400)
PMV BLD AUTO: 10.1 FL (ref 7.4–10.4)
POTASSIUM SERPL-SCNC: 3.8 MMOL/L (ref 3.5–5.3)
PROT UR QL: NEGATIVE MG/DL
RBC MORPH BLD: 4.15 10*6/UL (ref 4.7–6.1)
SODIUM SERPL-SCNC: 142 MMOL/L (ref 135–147)
SP GR UR: 1.01 (ref 1–1.03)
WBC # BLD AUTO: 8.97 10*3/UL (ref 4.8–10.8)

## 2019-12-04 ENCOUNTER — HOSPITAL ENCOUNTER (OUTPATIENT)
Dept: OTHER | Facility: HOSPITAL | Age: 69
Setting detail: RECURRING SERIES
Discharge: HOME | End: 2019-12-31
Attending: INTERNAL MEDICINE

## 2019-12-04 LAB
ALBUMIN SERPL-MCNC: 3.7 G/DL (ref 3.5–5)
ALBUMIN/GLOB SERPL: 1.3 {RATIO} (ref 1.4–2.6)
ALP SERPL-CCNC: 90 U/L (ref 56–155)
ALT SERPL-CCNC: 15 U/L (ref 10–40)
ANION GAP SERPL CALC-SCNC: 17 MMOL/L (ref 8–19)
AST SERPL-CCNC: 22 U/L (ref 15–50)
BASOPHILS # BLD AUTO: 0.06 10*3/UL (ref 0–0.2)
BASOPHILS NFR BLD AUTO: 0.5 % (ref 0–3)
BILIRUB SERPL-MCNC: 0.7 MG/DL (ref 0.2–1.3)
BUN SERPL-MCNC: 22 MG/DL (ref 5–25)
BUN/CREAT SERPL: 28 {RATIO} (ref 6–20)
CALCIUM SERPL-MCNC: 9.5 MG/DL (ref 8.7–10.4)
CHLORIDE SERPL-SCNC: 103 MMOL/L (ref 99–111)
CONV ABS IMM GRAN: 0.14 10*3/UL (ref 0–0.54)
CONV CO2: 26 MMOL/L (ref 22–32)
CONV EOSINOPHILS PERCENT BY MANUAL COUNT: 1.3 % (ref 0–7)
CONV IMMATURE GRAN: 1.1 % (ref 0–0.4)
CONV TOTAL PROTEIN: 6.6 G/DL (ref 6.3–8.2)
CREAT UR-MCNC: 0.79 MG/DL (ref 0.7–1.2)
EOSINOPHIL # BLD MANUAL: 0.16 10*3/UL (ref 0–0.7)
ERYTHROCYTE [DISTWIDTH] IN BLOOD BY AUTOMATED COUNT: 20.3 % (ref 11.5–14.5)
ERYTHROCYTE [DISTWIDTH] IN BLOOD BY AUTOMATED COUNT: 76.3 FL
GFR SERPLBLD BASED ON 1.73 SQ M-ARVRAT: >60 ML/MIN/{1.73_M2}
GLOBULIN UR ELPH-MCNC: 2.9 G/DL (ref 2–3.5)
GLUCOSE SERPL-MCNC: 91 MG/DL (ref 70–99)
HBA1C MFR BLD: 12.3 G/DL (ref 14–18)
HCT VFR BLD AUTO: 38.8 % (ref 42–52)
LYMPHOCYTES # BLD AUTO: 1.82 10*3/UL (ref 1–5)
LYMPHOCYTES NFR BLD AUTO: 14.5 % (ref 20–45)
MCH RBC QN AUTO: 32 PG (ref 27–31)
MCHC RBC AUTO-ENTMCNC: 31.7 G/DL (ref 33–37)
MCV RBC AUTO: 101 FL (ref 80–96)
MONOCYTES # BLD AUTO: 0.43 10*3/UL (ref 0.2–1.2)
MONOCYTES NFR BLD MANUAL: 3.4 % (ref 3–10)
NEUTROPHILS # BLD AUTO: 9.94 10*3/UL (ref 2–8)
NEUTROPHILS NFR BLD MANUAL: 79.2 % (ref 30–85)
OSMOLALITY SERPL CALC.SUM OF ELEC: 297 MOSM/KG (ref 273–304)
PLATELET # BLD AUTO: 274 10*3/UL (ref 130–400)
PMV BLD AUTO: 10.8 FL (ref 7.4–10.4)
POTASSIUM SERPL-SCNC: 3.7 MMOL/L (ref 3.5–5.3)
RBC MORPH BLD: 3.84 10*6/UL (ref 4.7–6.1)
SODIUM SERPL-SCNC: 142 MMOL/L (ref 135–147)
WBC # BLD AUTO: 12.55 10*3/UL (ref 4.8–10.8)

## 2019-12-05 RX ORDER — PRAVASTATIN SODIUM 80 MG/1
80 TABLET ORAL DAILY
Qty: 90 TABLET | Refills: 0 | Status: SHIPPED | OUTPATIENT
Start: 2019-12-05

## 2019-12-11 LAB
BASOPHILS # BLD AUTO: 0.02 10*3/UL (ref 0–0.2)
BASOPHILS NFR BLD AUTO: 0.5 % (ref 0–3)
CONV ABS IMM GRAN: 0.02 10*3/UL (ref 0–0.54)
CONV EOSINOPHILS PERCENT BY MANUAL COUNT: 3.4 % (ref 0–7)
CONV IMMATURE GRAN: 0.5 % (ref 0–0.4)
EOSINOPHIL # BLD MANUAL: 0.15 10*3/UL (ref 0–0.7)
ERYTHROCYTE [DISTWIDTH] IN BLOOD BY AUTOMATED COUNT: 19.3 % (ref 11.5–14.5)
ERYTHROCYTE [DISTWIDTH] IN BLOOD BY AUTOMATED COUNT: 73.2 FL
HBA1C MFR BLD: 11.2 G/DL (ref 14–18)
HCT VFR BLD AUTO: 35.7 % (ref 42–52)
LYMPHOCYTES # BLD AUTO: 1.16 10*3/UL (ref 1–5)
LYMPHOCYTES NFR BLD AUTO: 26.3 % (ref 20–45)
MCH RBC QN AUTO: 32.7 PG (ref 27–31)
MCHC RBC AUTO-ENTMCNC: 31.4 G/DL (ref 33–37)
MCV RBC AUTO: 104.1 FL (ref 80–96)
MONOCYTES # BLD AUTO: 0.28 10*3/UL (ref 0.2–1.2)
MONOCYTES NFR BLD MANUAL: 6.3 % (ref 3–10)
NEUTROPHILS # BLD AUTO: 2.78 10*3/UL (ref 2–8)
NEUTROPHILS NFR BLD MANUAL: 63 % (ref 30–85)
PLATELET # BLD AUTO: 246 10*3/UL (ref 130–400)
PMV BLD AUTO: 10.9 FL (ref 7.4–10.4)
RBC MORPH BLD: 3.43 10*6/UL (ref 4.7–6.1)
WBC # BLD AUTO: 4.41 10*3/UL (ref 4.8–10.8)

## 2019-12-26 LAB
ALBUMIN SERPL-MCNC: 3.7 G/DL (ref 3.5–5)
ALBUMIN/GLOB SERPL: 1.3 {RATIO} (ref 1.4–2.6)
ALP SERPL-CCNC: 77 U/L (ref 56–155)
ALT SERPL-CCNC: 14 U/L (ref 10–40)
ANION GAP SERPL CALC-SCNC: 13 MMOL/L (ref 8–19)
APPEARANCE UR: CLEAR
AST SERPL-CCNC: 21 U/L (ref 15–50)
BASOPHILS # BLD AUTO: 0.05 10*3/UL (ref 0–0.2)
BASOPHILS NFR BLD AUTO: 0.7 % (ref 0–3)
BILIRUB SERPL-MCNC: 0.74 MG/DL (ref 0.2–1.3)
BILIRUB UR QL: NEGATIVE
BUN SERPL-MCNC: 17 MG/DL (ref 5–25)
BUN/CREAT SERPL: 22 {RATIO} (ref 6–20)
CALCIUM SERPL-MCNC: 9.5 MG/DL (ref 8.7–10.4)
CHLORIDE SERPL-SCNC: 101 MMOL/L (ref 99–111)
COLOR UR: YELLOW
CONV ABS IMM GRAN: 0.04 10*3/UL (ref 0–0.54)
CONV BACTERIA: NEGATIVE
CONV CO2: 29 MMOL/L (ref 22–32)
CONV COLLECTION SOURCE (UA): ABNORMAL
CONV EOSINOPHILS PERCENT BY MANUAL COUNT: 1.5 % (ref 0–7)
CONV IMMATURE GRAN: 0.6 % (ref 0–0.4)
CONV TOTAL PROTEIN: 6.5 G/DL (ref 6.3–8.2)
CONV UROBILINOGEN IN URINE BY AUTOMATED TEST STRIP: 1 {EHRLICHU}/DL (ref 0.1–1)
CREAT UR-MCNC: 0.79 MG/DL (ref 0.7–1.2)
EOSINOPHIL # BLD MANUAL: 0.11 10*3/UL (ref 0–0.7)
ERYTHROCYTE [DISTWIDTH] IN BLOOD BY AUTOMATED COUNT: 19.3 % (ref 11.5–14.5)
ERYTHROCYTE [DISTWIDTH] IN BLOOD BY AUTOMATED COUNT: 73.8 FL
GFR SERPLBLD BASED ON 1.73 SQ M-ARVRAT: >60 ML/MIN/{1.73_M2}
GLOBULIN UR ELPH-MCNC: 2.8 G/DL (ref 2–3.5)
GLUCOSE SERPL-MCNC: 87 MG/DL (ref 70–99)
GLUCOSE UR QL: NEGATIVE MG/DL
HBA1C MFR BLD: 12.3 G/DL (ref 14–18)
HCT VFR BLD AUTO: 38.7 % (ref 42–52)
HGB UR QL STRIP: NEGATIVE
KETONES UR QL STRIP: NEGATIVE MG/DL
LEUKOCYTE ESTERASE UR QL STRIP: ABNORMAL
LYMPHOCYTES # BLD AUTO: 2 10*3/UL (ref 1–5)
LYMPHOCYTES NFR BLD AUTO: 27.7 % (ref 20–45)
MCH RBC QN AUTO: 32.9 PG (ref 27–31)
MCHC RBC AUTO-ENTMCNC: 31.8 G/DL (ref 33–37)
MCV RBC AUTO: 103.5 FL (ref 80–96)
MONOCYTES # BLD AUTO: 0.9 10*3/UL (ref 0.2–1.2)
MONOCYTES NFR BLD MANUAL: 12.5 % (ref 3–10)
NEUTROPHILS # BLD AUTO: 4.11 10*3/UL (ref 2–8)
NEUTROPHILS NFR BLD MANUAL: 57 % (ref 30–85)
NITRITE UR QL STRIP: NEGATIVE
OSMOLALITY SERPL CALC.SUM OF ELEC: 289 MOSM/KG (ref 273–304)
PH UR STRIP.AUTO: 7.5 [PH] (ref 5–8)
PLATELET # BLD AUTO: 256 10*3/UL (ref 130–400)
PMV BLD AUTO: 10.1 FL (ref 7.4–10.4)
POTASSIUM SERPL-SCNC: 3.8 MMOL/L (ref 3.5–5.3)
PROT UR QL: NEGATIVE MG/DL
RBC #/AREA URNS HPF: ABNORMAL /[HPF]
RBC MORPH BLD: 3.74 10*6/UL (ref 4.7–6.1)
SODIUM SERPL-SCNC: 139 MMOL/L (ref 135–147)
SP GR UR: 1.01 (ref 1–1.03)
T4 FREE SERPL-MCNC: 1.1 NG/DL (ref 0.9–1.8)
TSH SERPL-ACNC: 5.88 M[IU]/L (ref 0.27–4.2)
WBC # BLD AUTO: 7.21 10*3/UL (ref 4.8–10.8)
WBC #/AREA URNS HPF: ABNORMAL /[HPF]

## 2020-01-01 ENCOUNTER — OFFICE VISIT CONVERTED (OUTPATIENT)
Dept: ONCOLOGY | Facility: HOSPITAL | Age: 70
End: 2020-01-01
Attending: INTERNAL MEDICINE

## 2020-01-01 ENCOUNTER — HOSPITAL ENCOUNTER (OUTPATIENT)
Dept: CT IMAGING | Facility: HOSPITAL | Age: 70
Discharge: HOME | End: 2020-12-04
Attending: INTERNAL MEDICINE

## 2020-01-01 ENCOUNTER — HOSPITAL ENCOUNTER (OUTPATIENT)
Dept: CT IMAGING | Facility: HOSPITAL | Age: 70
Discharge: HOME | End: 2020-08-24
Attending: INTERNAL MEDICINE

## 2020-01-01 ENCOUNTER — HOSPITAL ENCOUNTER (OUTPATIENT)
Dept: OTHER | Facility: HOSPITAL | Age: 70
Setting detail: RECURRING SERIES
Discharge: STILL ADMITTED OR ADMINISTRATIVE | End: 2020-10-23
Attending: INTERNAL MEDICINE

## 2020-01-01 ENCOUNTER — HOSPITAL ENCOUNTER (OUTPATIENT)
Dept: OTHER | Facility: HOSPITAL | Age: 70
Discharge: HOME | End: 2020-12-09
Attending: INTERNAL MEDICINE

## 2020-01-01 ENCOUNTER — OFFICE VISIT (OUTPATIENT)
Dept: CARDIOLOGY | Facility: CLINIC | Age: 70
End: 2020-01-01

## 2020-01-01 ENCOUNTER — HOSPITAL ENCOUNTER (OUTPATIENT)
Dept: OTHER | Facility: HOSPITAL | Age: 70
Discharge: HOME | End: 2020-11-03
Attending: INTERNAL MEDICINE

## 2020-01-01 ENCOUNTER — HOSPITAL ENCOUNTER (OUTPATIENT)
Dept: OTHER | Facility: HOSPITAL | Age: 70
Discharge: HOME | End: 2020-08-26
Attending: INTERNAL MEDICINE

## 2020-01-01 VITALS
HEART RATE: 70 BPM | DIASTOLIC BLOOD PRESSURE: 80 MMHG | OXYGEN SATURATION: 95 % | HEIGHT: 71 IN | BODY MASS INDEX: 35.48 KG/M2 | SYSTOLIC BLOOD PRESSURE: 134 MMHG | WEIGHT: 253.4 LBS | RESPIRATION RATE: 18 BRPM

## 2020-01-01 DIAGNOSIS — I10 ESSENTIAL HYPERTENSION: ICD-10-CM

## 2020-01-01 DIAGNOSIS — I25.10 CORONARY ARTERY DISEASE INVOLVING NATIVE CORONARY ARTERY OF NATIVE HEART WITHOUT ANGINA PECTORIS: Primary | ICD-10-CM

## 2020-01-01 LAB
ALBUMIN SERPL-MCNC: 2.9 G/DL (ref 3.5–5)
ALBUMIN SERPL-MCNC: 3 G/DL (ref 3.5–5)
ALBUMIN SERPL-MCNC: 3.1 G/DL (ref 3.5–5)
ALBUMIN SERPL-MCNC: 3.1 G/DL (ref 3.5–5)
ALBUMIN SERPL-MCNC: 3.5 G/DL (ref 3.5–5)
ALBUMIN SERPL-MCNC: 3.7 G/DL (ref 3.5–5)
ALBUMIN SERPL-MCNC: 3.8 G/DL (ref 3.5–5)
ALBUMIN/GLOB SERPL: 0.9 {RATIO} (ref 1.4–2.6)
ALBUMIN/GLOB SERPL: 0.9 {RATIO} (ref 1.4–2.6)
ALBUMIN/GLOB SERPL: 1 {RATIO} (ref 1.4–2.6)
ALBUMIN/GLOB SERPL: 1.1 {RATIO} (ref 1.4–2.6)
ALBUMIN/GLOB SERPL: 1.2 {RATIO} (ref 1.4–2.6)
ALBUMIN/GLOB SERPL: 1.4 {RATIO} (ref 1.4–2.6)
ALBUMIN/GLOB SERPL: 1.7 {RATIO} (ref 1.4–2.6)
ALP SERPL-CCNC: 117 U/L (ref 56–155)
ALP SERPL-CCNC: 124 U/L (ref 56–155)
ALP SERPL-CCNC: 132 U/L (ref 56–155)
ALP SERPL-CCNC: 136 U/L (ref 56–155)
ALP SERPL-CCNC: 138 U/L (ref 56–155)
ALP SERPL-CCNC: 139 U/L (ref 56–155)
ALP SERPL-CCNC: 173 U/L (ref 56–155)
ALT SERPL-CCNC: 20 U/L (ref 10–40)
ALT SERPL-CCNC: 21 U/L (ref 10–40)
ALT SERPL-CCNC: 23 U/L (ref 10–40)
ALT SERPL-CCNC: 24 U/L (ref 10–40)
ALT SERPL-CCNC: 25 U/L (ref 10–40)
ALT SERPL-CCNC: 27 U/L (ref 10–40)
ALT SERPL-CCNC: 35 U/L (ref 10–40)
ANION GAP SERPL CALC-SCNC: 10 MMOL/L (ref 8–19)
ANION GAP SERPL CALC-SCNC: 11 MMOL/L (ref 8–19)
ANION GAP SERPL CALC-SCNC: 12 MMOL/L (ref 8–19)
ANION GAP SERPL CALC-SCNC: 12 MMOL/L (ref 8–19)
ANION GAP SERPL CALC-SCNC: 15 MMOL/L (ref 8–19)
APPEARANCE UR: CLEAR
AST SERPL-CCNC: 18 U/L (ref 15–50)
AST SERPL-CCNC: 18 U/L (ref 15–50)
AST SERPL-CCNC: 21 U/L (ref 15–50)
AST SERPL-CCNC: 21 U/L (ref 15–50)
AST SERPL-CCNC: 22 U/L (ref 15–50)
AST SERPL-CCNC: 28 U/L (ref 15–50)
AST SERPL-CCNC: 35 U/L (ref 15–50)
BASOPHILS # BLD AUTO: 0.01 10*3/UL (ref 0–0.2)
BASOPHILS # BLD AUTO: 0.01 10*3/UL (ref 0–0.2)
BASOPHILS # BLD AUTO: 0.03 10*3/UL (ref 0–0.2)
BASOPHILS # BLD AUTO: 0.04 10*3/UL (ref 0–0.2)
BASOPHILS # BLD AUTO: 0.05 10*3/UL (ref 0–0.2)
BASOPHILS # BLD AUTO: 0.05 10*3/UL (ref 0–0.2)
BASOPHILS # BLD AUTO: 0.06 10*3/UL (ref 0–0.2)
BASOPHILS NFR BLD AUTO: 0.2 % (ref 0–3)
BASOPHILS NFR BLD AUTO: 0.2 % (ref 0–3)
BASOPHILS NFR BLD AUTO: 0.3 % (ref 0–3)
BASOPHILS NFR BLD AUTO: 0.3 % (ref 0–3)
BASOPHILS NFR BLD AUTO: 0.4 % (ref 0–3)
BASOPHILS NFR BLD AUTO: 0.4 % (ref 0–3)
BASOPHILS NFR BLD AUTO: 0.5 % (ref 0–3)
BILIRUB SERPL-MCNC: 0.24 MG/DL (ref 0.2–1.3)
BILIRUB SERPL-MCNC: 0.26 MG/DL (ref 0.2–1.3)
BILIRUB SERPL-MCNC: 0.31 MG/DL (ref 0.2–1.3)
BILIRUB SERPL-MCNC: 0.38 MG/DL (ref 0.2–1.3)
BILIRUB SERPL-MCNC: 0.54 MG/DL (ref 0.2–1.3)
BILIRUB SERPL-MCNC: 0.55 MG/DL (ref 0.2–1.3)
BILIRUB SERPL-MCNC: 0.87 MG/DL (ref 0.2–1.3)
BILIRUB UR QL: NEGATIVE
BUN SERPL-MCNC: 11 MG/DL (ref 5–25)
BUN SERPL-MCNC: 20 MG/DL (ref 5–25)
BUN SERPL-MCNC: 23 MG/DL (ref 5–25)
BUN SERPL-MCNC: 24 MG/DL (ref 5–25)
BUN SERPL-MCNC: 25 MG/DL (ref 5–25)
BUN SERPL-MCNC: 32 MG/DL (ref 5–25)
BUN SERPL-MCNC: 48 MG/DL (ref 5–25)
BUN/CREAT SERPL: 11 {RATIO} (ref 6–20)
BUN/CREAT SERPL: 22 {RATIO} (ref 6–20)
BUN/CREAT SERPL: 22 {RATIO} (ref 6–20)
BUN/CREAT SERPL: 25 {RATIO} (ref 6–20)
BUN/CREAT SERPL: 26 {RATIO} (ref 6–20)
BUN/CREAT SERPL: 32 {RATIO} (ref 6–20)
BUN/CREAT SERPL: 38 {RATIO} (ref 6–20)
CALCIUM SERPL-MCNC: 10.4 MG/DL (ref 8.7–10.4)
CALCIUM SERPL-MCNC: 9.2 MG/DL (ref 8.7–10.4)
CALCIUM SERPL-MCNC: 9.5 MG/DL (ref 8.7–10.4)
CALCIUM SERPL-MCNC: 9.6 MG/DL (ref 8.7–10.4)
CALCIUM SERPL-MCNC: 9.6 MG/DL (ref 8.7–10.4)
CHLORIDE SERPL-SCNC: 101 MMOL/L (ref 99–111)
CHLORIDE SERPL-SCNC: 101 MMOL/L (ref 99–111)
CHLORIDE SERPL-SCNC: 102 MMOL/L (ref 99–111)
CHLORIDE SERPL-SCNC: 111 MMOL/L (ref 99–111)
CHLORIDE SERPL-SCNC: 112 MMOL/L (ref 99–111)
COLOR UR: YELLOW
CONV ABS IMM GRAN: 0.02 10*3/UL (ref 0–0.2)
CONV ABS IMM GRAN: 0.03 10*3/UL (ref 0–0.54)
CONV ABS IMM GRAN: 0.05 10*3/UL (ref 0–0.54)
CONV ABS IMM GRAN: 0.05 10*3/UL (ref 0–0.54)
CONV ABS IMM GRAN: 0.06 10*3/UL (ref 0–0.54)
CONV ABS IMM GRAN: 0.12 10*3/UL (ref 0–0.2)
CONV ABS IMM GRAN: 1.9 10*3/UL (ref 0–0.54)
CONV ANISOCYTES: NORMAL
CONV CO2: 23 MMOL/L (ref 22–32)
CONV CO2: 25 MMOL/L (ref 22–32)
CONV CO2: 26 MMOL/L (ref 22–32)
CONV CO2: 28 MMOL/L (ref 22–32)
CONV CO2: 28 MMOL/L (ref 22–32)
CONV CO2: 29 MMOL/L (ref 22–32)
CONV CO2: 31 MMOL/L (ref 22–32)
CONV COLLECTION SOURCE (UA): NORMAL
CONV EOSINOPHILS PERCENT BY MANUAL COUNT: 1 % (ref 0–7)
CONV EOSINOPHILS PERCENT BY MANUAL COUNT: 2 % (ref 0–7)
CONV EOSINOPHILS PERCENT BY MANUAL COUNT: 3.6 % (ref 0–7)
CONV EOSINOPHILS PERCENT BY MANUAL COUNT: 3.7 % (ref 0–7)
CONV EOSINOPHILS PERCENT BY MANUAL COUNT: 3.8 % (ref 0–7)
CONV HYPOCHROMIA IN BLOOD BY LIGHT MICROSCOPY: SLIGHT
CONV IMMATURE GRAN: 0.3 % (ref 0–0.4)
CONV IMMATURE GRAN: 0.4 % (ref 0–0.4)
CONV IMMATURE GRAN: 0.5 % (ref 0–1.8)
CONV IMMATURE GRAN: 0.6 % (ref 0–0.4)
CONV IMMATURE GRAN: 0.6 % (ref 0–0.4)
CONV IMMATURE GRAN: 0.7 % (ref 0–1.8)
CONV IMMATURE GRAN: 22.8 % (ref 0–0.4)
CONV TOTAL PROTEIN: 5.4 G/DL (ref 6.3–8.2)
CONV TOTAL PROTEIN: 5.7 G/DL (ref 6.3–8.2)
CONV TOTAL PROTEIN: 6.1 G/DL (ref 6.3–8.2)
CONV TOTAL PROTEIN: 6.3 G/DL (ref 6.3–8.2)
CONV TOTAL PROTEIN: 6.4 G/DL (ref 6.3–8.2)
CONV TOTAL PROTEIN: 6.4 G/DL (ref 6.3–8.2)
CONV TOTAL PROTEIN: 7.1 G/DL (ref 6.3–8.2)
CONV UROBILINOGEN IN URINE BY AUTOMATED TEST STRIP: 1 {EHRLICHU}/DL (ref 0.1–1)
CREAT UR-MCNC: 0.81 MG/DL (ref 0.7–1.2)
CREAT UR-MCNC: 0.91 MG/DL (ref 0.7–1.2)
CREAT UR-MCNC: 0.97 MG/DL (ref 0.7–1.2)
CREAT UR-MCNC: 1.01 MG/DL (ref 0.7–1.2)
CREAT UR-MCNC: 1.04 MG/DL (ref 0.7–1.2)
CREAT UR-MCNC: 1.14 MG/DL (ref 0.7–1.2)
CREAT UR-MCNC: 1.27 MG/DL (ref 0.7–1.2)
DEPRECATED RDW RBC AUTO: 65.7 FL (ref 35.1–43.9)
DEPRECATED RDW RBC AUTO: 69.2 FL (ref 35.1–43.9)
EOSINOPHIL # BLD AUTO: 0.06 10*3/UL (ref 0–0.7)
EOSINOPHIL # BLD AUTO: 0.35 10*3/UL (ref 0–0.7)
EOSINOPHIL # BLD AUTO: 1.4 % (ref 0–7)
EOSINOPHIL # BLD AUTO: 2.2 % (ref 0–7)
EOSINOPHIL # BLD MANUAL: 0.08 10*3/UL (ref 0–0.7)
EOSINOPHIL # BLD MANUAL: 0.17 10*3/UL (ref 0–0.7)
EOSINOPHIL # BLD MANUAL: 0.2 10*3/UL (ref 0–0.7)
EOSINOPHIL # BLD MANUAL: 0.54 10*3/UL (ref 0–0.7)
EOSINOPHIL # BLD MANUAL: 0.56 10*3/UL (ref 0–0.7)
ERYTHROCYTE [DISTWIDTH] IN BLOOD BY AUTOMATED COUNT: 18.7 % (ref 11.6–14.4)
ERYTHROCYTE [DISTWIDTH] IN BLOOD BY AUTOMATED COUNT: 18.8 % (ref 11.5–14.5)
ERYTHROCYTE [DISTWIDTH] IN BLOOD BY AUTOMATED COUNT: 18.8 % (ref 11.6–14.4)
ERYTHROCYTE [DISTWIDTH] IN BLOOD BY AUTOMATED COUNT: 18.9 % (ref 11.5–14.5)
ERYTHROCYTE [DISTWIDTH] IN BLOOD BY AUTOMATED COUNT: 19.4 % (ref 11.5–14.5)
ERYTHROCYTE [DISTWIDTH] IN BLOOD BY AUTOMATED COUNT: 20.9 % (ref 11.5–14.5)
ERYTHROCYTE [DISTWIDTH] IN BLOOD BY AUTOMATED COUNT: 21.4 % (ref 11.5–14.5)
ERYTHROCYTE [DISTWIDTH] IN BLOOD BY AUTOMATED COUNT: 63.7 FL
ERYTHROCYTE [DISTWIDTH] IN BLOOD BY AUTOMATED COUNT: 67.6 FL
ERYTHROCYTE [DISTWIDTH] IN BLOOD BY AUTOMATED COUNT: 68.5 FL
ERYTHROCYTE [DISTWIDTH] IN BLOOD BY AUTOMATED COUNT: 69.8 FL
ERYTHROCYTE [DISTWIDTH] IN BLOOD BY AUTOMATED COUNT: 73.8 FL
GFR SERPLBLD BASED ON 1.73 SQ M-ARVRAT: 58 ML/MIN/{1.73_M2}
GFR SERPLBLD BASED ON 1.73 SQ M-ARVRAT: >60 ML/MIN/{1.73_M2}
GLOBULIN UR ELPH-MCNC: 2.3 G/DL (ref 2–3.5)
GLOBULIN UR ELPH-MCNC: 2.5 G/DL (ref 2–3.5)
GLOBULIN UR ELPH-MCNC: 2.6 G/DL (ref 2–3.5)
GLOBULIN UR ELPH-MCNC: 2.7 G/DL (ref 2–3.5)
GLOBULIN UR ELPH-MCNC: 3.3 G/DL (ref 2–3.5)
GLOBULIN UR ELPH-MCNC: 3.3 G/DL (ref 2–3.5)
GLOBULIN UR ELPH-MCNC: 3.6 G/DL (ref 2–3.5)
GLUCOSE SERPL-MCNC: 101 MG/DL (ref 70–99)
GLUCOSE SERPL-MCNC: 103 MG/DL (ref 70–99)
GLUCOSE SERPL-MCNC: 109 MG/DL (ref 70–99)
GLUCOSE SERPL-MCNC: 121 MG/DL (ref 70–99)
GLUCOSE SERPL-MCNC: 92 MG/DL (ref 70–99)
GLUCOSE SERPL-MCNC: 94 MG/DL (ref 70–99)
GLUCOSE SERPL-MCNC: 95 MG/DL (ref 70–99)
GLUCOSE UR QL: NEGATIVE MG/DL
HBA1C MFR BLD: 10.6 G/DL (ref 14–18)
HBA1C MFR BLD: 11.1 G/DL (ref 14–18)
HBA1C MFR BLD: 11.6 G/DL (ref 14–18)
HBA1C MFR BLD: 9.6 G/DL (ref 14–18)
HBA1C MFR BLD: 9.6 G/DL (ref 14–18)
HCT VFR BLD AUTO: 30 % (ref 42–52)
HCT VFR BLD AUTO: 30.9 % (ref 42–52)
HCT VFR BLD AUTO: 31.6 % (ref 42–52)
HCT VFR BLD AUTO: 34.9 % (ref 42–52)
HCT VFR BLD AUTO: 37.4 % (ref 42–52)
HCT VFR BLD AUTO: 39.3 % (ref 42–52)
HCT VFR BLD AUTO: 41 % (ref 42–52)
HGB BLD-MCNC: 12.4 G/DL (ref 14–18)
HGB BLD-MCNC: 9.3 G/DL (ref 14–18)
HGB UR QL STRIP: NEGATIVE
KETONES UR QL STRIP: NEGATIVE MG/DL
LEUKOCYTE ESTERASE UR QL STRIP: NEGATIVE
LYMPHOCYTES # BLD AUTO: 0.77 10*3/UL (ref 1–5)
LYMPHOCYTES # BLD AUTO: 1.38 10*3/UL (ref 1–5)
LYMPHOCYTES # BLD AUTO: 1.43 10*3/UL (ref 1–5)
LYMPHOCYTES # BLD AUTO: 1.69 10*3/UL (ref 1–5)
LYMPHOCYTES # BLD AUTO: 1.8 10*3/UL (ref 1–5)
LYMPHOCYTES # BLD AUTO: 1.8 10*3/UL (ref 1–5)
LYMPHOCYTES # BLD AUTO: 2.13 10*3/UL (ref 1–5)
LYMPHOCYTES NFR BLD AUTO: 10 % (ref 20–45)
LYMPHOCYTES NFR BLD AUTO: 10.5 % (ref 20–45)
LYMPHOCYTES NFR BLD AUTO: 11.8 % (ref 20–45)
LYMPHOCYTES NFR BLD AUTO: 14.1 % (ref 20–45)
LYMPHOCYTES NFR BLD AUTO: 17.2 % (ref 20–45)
LYMPHOCYTES NFR BLD AUTO: 26.3 % (ref 20–45)
LYMPHOCYTES NFR BLD AUTO: 42.2 % (ref 20–45)
MACROCYTES BLD QL SMEAR: NORMAL
MAGNESIUM SERPL-MCNC: 1.02 MG/DL (ref 1.6–2.3)
MAGNESIUM SERPL-MCNC: 1.26 MG/DL (ref 1.6–2.3)
MAGNESIUM SERPL-MCNC: 1.75 MG/DL (ref 1.6–2.3)
MAGNESIUM SERPL-MCNC: 1.85 MG/DL (ref 1.6–2.3)
MCH RBC QN AUTO: 28 PG (ref 27–31)
MCH RBC QN AUTO: 28.2 PG (ref 27–31)
MCH RBC QN AUTO: 28.6 PG (ref 27–31)
MCH RBC QN AUTO: 28.9 PG (ref 27–31)
MCH RBC QN AUTO: 29.6 PG (ref 27–31)
MCH RBC QN AUTO: 29.9 PG (ref 27–31)
MCH RBC QN AUTO: 31.4 PG (ref 27–31)
MCHC RBC AUTO-ENTMCNC: 29.5 G/DL (ref 33–37)
MCHC RBC AUTO-ENTMCNC: 29.7 G/DL (ref 33–37)
MCHC RBC AUTO-ENTMCNC: 30.2 G/DL (ref 33–37)
MCHC RBC AUTO-ENTMCNC: 30.4 G/DL (ref 33–37)
MCHC RBC AUTO-ENTMCNC: 30.4 G/DL (ref 33–37)
MCHC RBC AUTO-ENTMCNC: 31 G/DL (ref 33–37)
MCHC RBC AUTO-ENTMCNC: 31.1 G/DL (ref 33–37)
MCV RBC AUTO: 101.4 FL (ref 80–96)
MCV RBC AUTO: 92.1 FL (ref 80–96)
MCV RBC AUTO: 92.7 FL (ref 80–96)
MCV RBC AUTO: 94.7 FL (ref 80–96)
MCV RBC AUTO: 96.3 FL (ref 80–96)
MCV RBC AUTO: 97.8 FL (ref 80–96)
MCV RBC AUTO: 99.7 FL (ref 80–96)
MICROCYTES BLD QL: NORMAL
MONOCYTES # BLD AUTO: 0.11 10*3/UL (ref 0.2–1.2)
MONOCYTES # BLD AUTO: 0.18 10*3/UL (ref 0.2–1.2)
MONOCYTES # BLD AUTO: 0.2 10*3/UL (ref 0.2–1.2)
MONOCYTES # BLD AUTO: 0.78 10*3/UL (ref 0.2–1.2)
MONOCYTES # BLD AUTO: 0.85 10*3/UL (ref 0.2–1.2)
MONOCYTES # BLD AUTO: 0.94 10*3/UL (ref 0.2–1.2)
MONOCYTES # BLD AUTO: 0.95 10*3/UL (ref 0.2–1.2)
MONOCYTES NFR BLD AUTO: 4.2 % (ref 3–10)
MONOCYTES NFR BLD AUTO: 5.8 % (ref 3–10)
MONOCYTES NFR BLD MANUAL: 2.1 % (ref 3–10)
MONOCYTES NFR BLD MANUAL: 2.6 % (ref 3–10)
MONOCYTES NFR BLD MANUAL: 5.6 % (ref 3–10)
MONOCYTES NFR BLD MANUAL: 6.2 % (ref 3–10)
MONOCYTES NFR BLD MANUAL: 9.4 % (ref 3–10)
NEUTROPHILS # BLD AUTO: 11.52 10*3/UL (ref 2–8)
NEUTROPHILS # BLD AUTO: 11.82 10*3/UL (ref 2–8)
NEUTROPHILS # BLD AUTO: 12.93 10*3/UL (ref 2–8)
NEUTROPHILS # BLD AUTO: 2.2 10*3/UL (ref 2–8)
NEUTROPHILS # BLD AUTO: 3.51 10*3/UL (ref 2–8)
NEUTROPHILS # BLD AUTO: 4.01 10*3/UL (ref 2–8)
NEUTROPHILS # BLD AUTO: 6.6 10*3/UL (ref 2–8)
NEUTROPHILS NFR BLD AUTO: 51.5 % (ref 30–85)
NEUTROPHILS NFR BLD AUTO: 80.5 % (ref 30–85)
NEUTROPHILS NFR BLD MANUAL: 48.1 % (ref 30–85)
NEUTROPHILS NFR BLD MANUAL: 67 % (ref 30–85)
NEUTROPHILS NFR BLD MANUAL: 75.9 % (ref 30–85)
NEUTROPHILS NFR BLD MANUAL: 77.7 % (ref 30–85)
NEUTROPHILS NFR BLD MANUAL: 85.4 % (ref 30–85)
NITRITE UR QL STRIP: NEGATIVE
NRBC CBCN: 0 % (ref 0–0.7)
NRBC CBCN: 0 % (ref 0–0.7)
OSMOLALITY SERPL CALC.SUM OF ELEC: 286 MOSM/KG (ref 273–304)
OSMOLALITY SERPL CALC.SUM OF ELEC: 288 MOSM/KG (ref 273–304)
OSMOLALITY SERPL CALC.SUM OF ELEC: 291 MOSM/KG (ref 273–304)
OSMOLALITY SERPL CALC.SUM OF ELEC: 292 MOSM/KG (ref 273–304)
OSMOLALITY SERPL CALC.SUM OF ELEC: 294 MOSM/KG (ref 273–304)
OSMOLALITY SERPL CALC.SUM OF ELEC: 297 MOSM/KG (ref 273–304)
OSMOLALITY SERPL CALC.SUM OF ELEC: 307 MOSM/KG (ref 273–304)
OVALOCYTES BLD QL SMEAR: NORMAL
PATHOLOGY REVIEW: NORMAL
PH UR STRIP.AUTO: 5.5 [PH] (ref 5–8)
PLATELET # BLD AUTO: 134 10*3/UL (ref 130–400)
PLATELET # BLD AUTO: 151 10*3/UL (ref 130–400)
PLATELET # BLD AUTO: 193 10*3/UL (ref 130–400)
PLATELET # BLD AUTO: 242 10*3/UL (ref 130–400)
PLATELET # BLD AUTO: 247 10*3/UL (ref 130–400)
PLATELET # BLD AUTO: 254 10*3/UL (ref 130–400)
PLATELET # BLD AUTO: 268 10*3/UL (ref 130–400)
PMV BLD AUTO: 10.6 FL (ref 7.4–10.4)
PMV BLD AUTO: 10.7 FL (ref 9.4–12.4)
PMV BLD AUTO: 9.3 FL (ref 7.4–10.4)
PMV BLD AUTO: 9.4 FL (ref 7.4–10.4)
PMV BLD AUTO: 9.7 FL (ref 7.4–10.4)
PMV BLD AUTO: 9.9 FL (ref 7.4–10.4)
PMV BLD AUTO: 9.9 FL (ref 9.4–12.4)
POIKILOCYTOSIS BLD QL SMEAR: SLIGHT
POTASSIUM SERPL-SCNC: 3.6 MMOL/L (ref 3.5–5.3)
POTASSIUM SERPL-SCNC: 3.6 MMOL/L (ref 3.5–5.3)
POTASSIUM SERPL-SCNC: 4 MMOL/L (ref 3.5–5.3)
POTASSIUM SERPL-SCNC: 4.1 MMOL/L (ref 3.5–5.3)
POTASSIUM SERPL-SCNC: 4.2 MMOL/L (ref 3.5–5.3)
POTASSIUM SERPL-SCNC: 4.4 MMOL/L (ref 3.5–5.3)
POTASSIUM SERPL-SCNC: 4.4 MMOL/L (ref 3.5–5.3)
PROT UR QL: NORMAL MG/DL
RBC # BLD AUTO: 2.96 10*6/UL (ref 4.7–6.1)
RBC # BLD AUTO: 4.33 10*6/UL (ref 4.7–6.1)
RBC MORPH BLD: 3.21 10*6/UL (ref 4.7–6.1)
RBC MORPH BLD: 3.41 10*6/UL (ref 4.7–6.1)
RBC MORPH BLD: 3.75 10*6/UL (ref 4.7–6.1)
RBC MORPH BLD: 3.79 10*6/UL (ref 4.7–6.1)
RBC MORPH BLD: 4.02 10*6/UL (ref 4.7–6.1)
SODIUM SERPL-SCNC: 136 MMOL/L (ref 135–147)
SODIUM SERPL-SCNC: 137 MMOL/L (ref 135–147)
SODIUM SERPL-SCNC: 137 MMOL/L (ref 135–147)
SODIUM SERPL-SCNC: 139 MMOL/L (ref 135–147)
SODIUM SERPL-SCNC: 140 MMOL/L (ref 135–147)
SODIUM SERPL-SCNC: 142 MMOL/L (ref 135–147)
SODIUM SERPL-SCNC: 144 MMOL/L (ref 135–147)
SP GR UR: 1.01 (ref 1–1.03)
T4 FREE SERPL-MCNC: 1.2 NG/DL (ref 0.9–1.8)
TARGETS BLD QL SMEAR: NORMAL
TSH SERPL-ACNC: 4.7 M[IU]/L (ref 0.27–4.2)
TSH SERPL-ACNC: 5.21 M[IU]/L (ref 0.27–4.2)
WBC # BLD AUTO: 15.16 10*3/UL (ref 4.8–10.8)
WBC # BLD AUTO: 15.23 10*3/UL (ref 4.8–10.8)
WBC # BLD AUTO: 16.08 10*3/UL (ref 4.8–10.8)
WBC # BLD AUTO: 4.27 10*3/UL (ref 4.8–10.8)
WBC # BLD AUTO: 5.24 10*3/UL (ref 4.8–10.8)
WBC # BLD AUTO: 7.73 10*3/UL (ref 4.8–10.8)
WBC # BLD AUTO: 8.33 10*3/UL (ref 4.8–10.8)

## 2020-01-01 PROCEDURE — 99214 OFFICE O/P EST MOD 30 MIN: CPT | Performed by: NURSE PRACTITIONER

## 2020-01-01 PROCEDURE — 93000 ELECTROCARDIOGRAM COMPLETE: CPT | Performed by: NURSE PRACTITIONER

## 2020-01-01 RX ORDER — CHLORTHALIDONE 25 MG/1
12.5 TABLET ORAL DAILY
Qty: 45 TABLET | Refills: 0 | Status: SHIPPED | OUTPATIENT
Start: 2020-01-01 | End: 2020-01-01

## 2020-01-01 RX ORDER — DULOXETIN HYDROCHLORIDE 20 MG/1
20 CAPSULE, DELAYED RELEASE ORAL DAILY
COMMUNITY
End: 2021-01-01

## 2020-01-01 RX ORDER — CHLORTHALIDONE 25 MG/1
TABLET ORAL
Qty: 15 TABLET | Refills: 1 | Status: SHIPPED | OUTPATIENT
Start: 2020-01-01 | End: 2020-01-01

## 2020-01-01 RX ORDER — ELECTROLYTES/DEXTROSE
100 SOLUTION, ORAL ORAL 2 TIMES DAILY
COMMUNITY
End: 2021-01-01

## 2020-01-01 RX ORDER — PROCHLORPERAZINE MALEATE 10 MG
10 TABLET ORAL DAILY
COMMUNITY
Start: 2020-01-01 | End: 2021-01-01

## 2020-01-01 RX ORDER — CHLORTHALIDONE 25 MG/1
TABLET ORAL
Qty: 45 TABLET | OUTPATIENT
Start: 2020-01-01

## 2020-01-01 RX ORDER — LEVOTHYROXINE SODIUM 0.05 MG/1
50 TABLET ORAL DAILY
COMMUNITY
Start: 2020-01-01 | End: 2021-01-01

## 2020-01-01 RX ORDER — CHLORTHALIDONE 25 MG/1
TABLET ORAL
Qty: 45 TABLET | Refills: 3 | Status: SHIPPED | OUTPATIENT
Start: 2020-01-01

## 2020-01-01 RX ORDER — IRBESARTAN 300 MG/1
300 TABLET ORAL DAILY
COMMUNITY
Start: 2020-07-19 | End: 2021-01-01

## 2020-01-02 ENCOUNTER — HOSPITAL ENCOUNTER (OUTPATIENT)
Dept: OTHER | Facility: HOSPITAL | Age: 70
Setting detail: RECURRING SERIES
Discharge: HOME | End: 2020-01-31
Attending: INTERNAL MEDICINE

## 2020-01-02 LAB
BASOPHILS # BLD AUTO: 0.1 10*3/UL (ref 0–0.2)
BASOPHILS NFR BLD AUTO: 0.8 % (ref 0–3)
CONV ABS IMM GRAN: 0.16 10*3/UL (ref 0–0.2)
CONV IMMATURE GRAN: 1.3 % (ref 0–1.8)
DEPRECATED RDW RBC AUTO: 73.6 FL (ref 35.1–43.9)
EOSINOPHIL # BLD AUTO: 0.34 10*3/UL (ref 0–0.7)
EOSINOPHIL # BLD AUTO: 2.8 % (ref 0–7)
ERYTHROCYTE [DISTWIDTH] IN BLOOD BY AUTOMATED COUNT: 19.2 % (ref 11.6–14.4)
HCT VFR BLD AUTO: 36.8 % (ref 42–52)
HGB BLD-MCNC: 11.6 G/DL (ref 14–18)
LYMPHOCYTES # BLD AUTO: 2.55 10*3/UL (ref 1–5)
LYMPHOCYTES NFR BLD AUTO: 20.6 % (ref 20–45)
MCH RBC QN AUTO: 33.2 PG (ref 27–31)
MCHC RBC AUTO-ENTMCNC: 31.5 G/DL (ref 33–37)
MCV RBC AUTO: 105.4 FL (ref 80–96)
MONOCYTES # BLD AUTO: 0.51 10*3/UL (ref 0.2–1.2)
MONOCYTES NFR BLD AUTO: 4.1 % (ref 3–10)
NEUTROPHILS # BLD AUTO: 8.7 10*3/UL (ref 2–8)
NEUTROPHILS NFR BLD AUTO: 70.4 % (ref 30–85)
NRBC CBCN: 0 % (ref 0–0.7)
PLATELET # BLD AUTO: 286 10*3/UL (ref 130–400)
PMV BLD AUTO: 11.2 FL (ref 9.4–12.4)
RBC # BLD AUTO: 3.49 10*6/UL (ref 4.7–6.1)
WBC # BLD AUTO: 12.36 10*3/UL (ref 4.8–10.8)

## 2020-01-09 LAB
BASOPHILS # BLD AUTO: 0.04 10*3/UL (ref 0–0.2)
BASOPHILS NFR BLD AUTO: 0.5 % (ref 0–3)
CONV ABS IMM GRAN: 0.04 10*3/UL (ref 0–0.54)
CONV EOSINOPHILS PERCENT BY MANUAL COUNT: 3 % (ref 0–7)
CONV IMMATURE GRAN: 0.5 % (ref 0–0.4)
EOSINOPHIL # BLD MANUAL: 0.24 10*3/UL (ref 0–0.7)
ERYTHROCYTE [DISTWIDTH] IN BLOOD BY AUTOMATED COUNT: 18.8 % (ref 11.5–14.5)
ERYTHROCYTE [DISTWIDTH] IN BLOOD BY AUTOMATED COUNT: 72.5 FL
HBA1C MFR BLD: 11.7 G/DL (ref 14–18)
HCT VFR BLD AUTO: 36.5 % (ref 42–52)
LYMPHOCYTES # BLD AUTO: 1.57 10*3/UL (ref 1–5)
LYMPHOCYTES NFR BLD AUTO: 19.5 % (ref 20–45)
MCH RBC QN AUTO: 33.6 PG (ref 27–31)
MCHC RBC AUTO-ENTMCNC: 32.1 G/DL (ref 33–37)
MCV RBC AUTO: 104.9 FL (ref 80–96)
MONOCYTES # BLD AUTO: 0.24 10*3/UL (ref 0.2–1.2)
MONOCYTES NFR BLD MANUAL: 3 % (ref 3–10)
NEUTROPHILS # BLD AUTO: 5.93 10*3/UL (ref 2–8)
NEUTROPHILS NFR BLD MANUAL: 73.5 % (ref 30–85)
PLATELET # BLD AUTO: 244 10*3/UL (ref 130–400)
PMV BLD AUTO: 10.9 FL (ref 7.4–10.4)
RBC MORPH BLD: 3.48 10*6/UL (ref 4.7–6.1)
WBC # BLD AUTO: 8.06 10*3/UL (ref 4.8–10.8)

## 2020-01-22 ENCOUNTER — OFFICE VISIT CONVERTED (OUTPATIENT)
Dept: ONCOLOGY | Facility: HOSPITAL | Age: 70
End: 2020-01-22
Attending: INTERNAL MEDICINE

## 2020-01-22 ENCOUNTER — HOSPITAL ENCOUNTER (OUTPATIENT)
Dept: ONCOLOGY | Facility: HOSPITAL | Age: 70
Discharge: HOME | End: 2020-01-22
Attending: INTERNAL MEDICINE

## 2020-01-22 LAB
ALBUMIN SERPL-MCNC: 3.7 G/DL (ref 3.5–5)
ALBUMIN/GLOB SERPL: 1.5 {RATIO} (ref 1.4–2.6)
ALP SERPL-CCNC: 81 U/L (ref 56–155)
ALT SERPL-CCNC: 14 U/L (ref 10–40)
ANION GAP SERPL CALC-SCNC: 10 MMOL/L (ref 8–19)
APPEARANCE UR: CLEAR
AST SERPL-CCNC: 19 U/L (ref 15–50)
BASOPHILS # BLD AUTO: 0.03 10*3/UL (ref 0–0.2)
BASOPHILS NFR BLD AUTO: 0.5 % (ref 0–3)
BILIRUB SERPL-MCNC: 0.62 MG/DL (ref 0.2–1.3)
BILIRUB UR QL: NEGATIVE
BUN SERPL-MCNC: 18 MG/DL (ref 5–25)
BUN/CREAT SERPL: 20 {RATIO} (ref 6–20)
CALCIUM SERPL-MCNC: 9.1 MG/DL (ref 8.7–10.4)
CALCIUM SPEC-SCNC: 8.9 MG/DL (ref 8.7–10.4)
CHLORIDE SERPL-SCNC: 103 MMOL/L (ref 99–111)
COLOR UR: YELLOW
CONV ABS IMM GRAN: 0.04 10*3/UL (ref 0–0.2)
CONV CO2: 29 MMOL/L (ref 22–32)
CONV COLLECTION SOURCE (UA): NORMAL
CONV IMMATURE GRAN: 0.7 % (ref 0–1.8)
CONV TOTAL PROTEIN: 6.1 G/DL (ref 6.3–8.2)
CONV UROBILINOGEN IN URINE BY AUTOMATED TEST STRIP: 0.2 {EHRLICHU}/DL (ref 0.1–1)
CREAT UR-MCNC: 0.91 MG/DL (ref 0.7–1.2)
DEPRECATED RDW RBC AUTO: 76.9 FL (ref 35.1–43.9)
EOSINOPHIL # BLD AUTO: 0.07 10*3/UL (ref 0–0.7)
EOSINOPHIL # BLD AUTO: 1.2 % (ref 0–7)
ERYTHROCYTE [DISTWIDTH] IN BLOOD BY AUTOMATED COUNT: 19.6 % (ref 11.6–14.4)
GFR SERPLBLD BASED ON 1.73 SQ M-ARVRAT: >60 ML/MIN/{1.73_M2}
GLOBULIN UR ELPH-MCNC: 2.4 G/DL (ref 2–3.5)
GLUCOSE SERPL-MCNC: 96 MG/DL (ref 70–99)
GLUCOSE UR QL: NEGATIVE MG/DL
HCT VFR BLD AUTO: 35.8 % (ref 42–52)
HGB BLD-MCNC: 11.5 G/DL (ref 14–18)
HGB UR QL STRIP: NEGATIVE
KETONES UR QL STRIP: NEGATIVE MG/DL
LEUKOCYTE ESTERASE UR QL STRIP: NEGATIVE
LYMPHOCYTES # BLD AUTO: 1.42 10*3/UL (ref 1–5)
LYMPHOCYTES NFR BLD AUTO: 23.5 % (ref 20–45)
MCH RBC QN AUTO: 34 PG (ref 27–31)
MCHC RBC AUTO-ENTMCNC: 32.1 G/DL (ref 33–37)
MCV RBC AUTO: 105.9 FL (ref 80–96)
MONOCYTES # BLD AUTO: 1.12 10*3/UL (ref 0.2–1.2)
MONOCYTES NFR BLD AUTO: 18.6 % (ref 3–10)
NEUTROPHILS # BLD AUTO: 3.35 10*3/UL (ref 2–8)
NEUTROPHILS NFR BLD AUTO: 55.5 % (ref 30–85)
NITRITE UR QL STRIP: NEGATIVE
NRBC CBCN: 1.3 % (ref 0–0.7)
OSMOLALITY SERPL CALC.SUM OF ELEC: 288 MOSM/KG (ref 273–304)
PH UR STRIP.AUTO: 7 [PH] (ref 5–8)
PLATELET # BLD AUTO: 261 10*3/UL (ref 130–400)
PMV BLD AUTO: 11 FL (ref 9.4–12.4)
POTASSIUM SERPL-SCNC: 3.9 MMOL/L (ref 3.5–5.3)
PROT UR QL: NEGATIVE MG/DL
RBC # BLD AUTO: 3.38 10*6/UL (ref 4.7–6.1)
SODIUM SERPL-SCNC: 138 MMOL/L (ref 135–147)
SP GR UR: 1.01 (ref 1–1.03)
WBC # BLD AUTO: 6.03 10*3/UL (ref 4.8–10.8)

## 2020-01-29 LAB
BASOPHILS # BLD AUTO: 0.09 10*3/UL (ref 0–0.2)
BASOPHILS NFR BLD AUTO: 1.1 % (ref 0–3)
CONV ABS IMM GRAN: 0.07 10*3/UL (ref 0–0.2)
CONV IMMATURE GRAN: 0.8 % (ref 0–1.8)
DEPRECATED RDW RBC AUTO: 70.6 FL (ref 35.1–43.9)
EOSINOPHIL # BLD AUTO: 0.15 10*3/UL (ref 0–0.7)
EOSINOPHIL # BLD AUTO: 1.8 % (ref 0–7)
ERYTHROCYTE [DISTWIDTH] IN BLOOD BY AUTOMATED COUNT: 18.6 % (ref 11.6–14.4)
HCT VFR BLD AUTO: 34.6 % (ref 42–52)
HGB BLD-MCNC: 10.9 G/DL (ref 14–18)
LYMPHOCYTES # BLD AUTO: 2.43 10*3/UL (ref 1–5)
LYMPHOCYTES NFR BLD AUTO: 28.7 % (ref 20–45)
MCH RBC QN AUTO: 33 PG (ref 27–31)
MCHC RBC AUTO-ENTMCNC: 31.5 G/DL (ref 33–37)
MCV RBC AUTO: 104.8 FL (ref 80–96)
MONOCYTES # BLD AUTO: 0.41 10*3/UL (ref 0.2–1.2)
MONOCYTES NFR BLD AUTO: 4.8 % (ref 3–10)
NEUTROPHILS # BLD AUTO: 5.31 10*3/UL (ref 2–8)
NEUTROPHILS NFR BLD AUTO: 62.8 % (ref 30–85)
NRBC CBCN: 0.2 % (ref 0–0.7)
PLATELET # BLD AUTO: 336 10*3/UL (ref 130–400)
PMV BLD AUTO: 11.5 FL (ref 9.4–12.4)
RBC # BLD AUTO: 3.3 10*6/UL (ref 4.7–6.1)
WBC # BLD AUTO: 8.46 10*3/UL (ref 4.8–10.8)

## 2020-02-05 ENCOUNTER — HOSPITAL ENCOUNTER (OUTPATIENT)
Dept: OTHER | Facility: HOSPITAL | Age: 70
Setting detail: RECURRING SERIES
Discharge: STILL ADMITTED OR ADMINISTRATIVE | End: 2020-05-04
Attending: INTERNAL MEDICINE

## 2020-02-05 LAB
BASOPHILS # BLD AUTO: 0.03 10*3/UL (ref 0–0.2)
BASOPHILS # BLD AUTO: 0.07 10*3/UL (ref 0–0.2)
BASOPHILS NFR BLD AUTO: 0.8 % (ref 0–3)
BASOPHILS NFR BLD AUTO: 1.8 % (ref 0–3)
CONV ABS IMM GRAN: 0.02 10*3/UL (ref 0–0.2)
CONV ABS IMM GRAN: 0.03 10*3/UL (ref 0–0.54)
CONV EOSINOPHILS PERCENT BY MANUAL COUNT: 3.6 % (ref 0–7)
CONV IMMATURE GRAN: 0.5 % (ref 0–1.8)
CONV IMMATURE GRAN: 0.8 % (ref 0–0.4)
DEPRECATED RDW RBC AUTO: 70.8 FL (ref 35.1–43.9)
EOSINOPHIL # BLD AUTO: 0.11 10*3/UL (ref 0–0.7)
EOSINOPHIL # BLD AUTO: 2.8 % (ref 0–7)
EOSINOPHIL # BLD MANUAL: 0.14 10*3/UL (ref 0–0.7)
ERYTHROCYTE [DISTWIDTH] IN BLOOD BY AUTOMATED COUNT: 18.5 % (ref 11.5–14.5)
ERYTHROCYTE [DISTWIDTH] IN BLOOD BY AUTOMATED COUNT: 18.6 % (ref 11.6–14.4)
ERYTHROCYTE [DISTWIDTH] IN BLOOD BY AUTOMATED COUNT: 71.1 FL
HBA1C MFR BLD: 11.3 G/DL (ref 14–18)
HCT VFR BLD AUTO: 35.3 % (ref 42–52)
HCT VFR BLD AUTO: 35.5 % (ref 42–52)
HGB BLD-MCNC: 11.2 G/DL (ref 14–18)
LYMPHOCYTES # BLD AUTO: 1.44 10*3/UL (ref 1–5)
LYMPHOCYTES # BLD AUTO: 1.46 10*3/UL (ref 1–5)
LYMPHOCYTES NFR BLD AUTO: 36.6 % (ref 20–45)
LYMPHOCYTES NFR BLD AUTO: 36.6 % (ref 20–45)
MCH RBC QN AUTO: 33 PG (ref 27–31)
MCH RBC QN AUTO: 33.1 PG (ref 27–31)
MCHC RBC AUTO-ENTMCNC: 31.7 G/DL (ref 33–37)
MCHC RBC AUTO-ENTMCNC: 31.8 G/DL (ref 33–37)
MCV RBC AUTO: 103.8 FL (ref 80–96)
MCV RBC AUTO: 104.4 FL (ref 80–96)
MONOCYTES # BLD AUTO: 0.13 10*3/UL (ref 0.2–1.2)
MONOCYTES # BLD AUTO: 0.16 10*3/UL (ref 0.2–1.2)
MONOCYTES NFR BLD AUTO: 3.3 % (ref 3–10)
MONOCYTES NFR BLD MANUAL: 4.1 % (ref 3–10)
NEUTROPHILS # BLD AUTO: 2.13 10*3/UL (ref 2–8)
NEUTROPHILS # BLD AUTO: 2.2 10*3/UL (ref 2–8)
NEUTROPHILS NFR BLD AUTO: 55 % (ref 30–85)
NEUTROPHILS NFR BLD MANUAL: 54.1 % (ref 30–85)
NRBC CBCN: 0.8 % (ref 0–0.7)
PLATELET # BLD AUTO: 322 10*3/UL (ref 130–400)
PLATELET # BLD AUTO: 332 10*3/UL (ref 130–400)
PMV BLD AUTO: 10.8 FL (ref 7.4–10.4)
PMV BLD AUTO: 11.8 FL (ref 9.4–12.4)
RBC # BLD AUTO: 3.38 10*6/UL (ref 4.7–6.1)
RBC MORPH BLD: 3.42 10*6/UL (ref 4.7–6.1)
WBC # BLD AUTO: 3.93 10*3/UL (ref 4.8–10.8)
WBC # BLD AUTO: 3.99 10*3/UL (ref 4.8–10.8)

## 2020-02-12 RX ORDER — CHLORTHALIDONE 25 MG/1
TABLET ORAL
Qty: 15 TABLET | Refills: 5 | Status: SHIPPED | OUTPATIENT
Start: 2020-02-12 | End: 2020-01-01

## 2020-02-12 NOTE — TELEPHONE ENCOUNTER
Pt L/M re: Chlorthalidone 25 mg and verified he was taking a 1/2 tab daily.  Please advise of any recommendations.    ELYSIA Romero

## 2020-02-12 NOTE — TELEPHONE ENCOUNTER
Called left patient a message to call us back and let us know if he is taking 25mg once daily or 1/2 daily. Also left message to see what his BP has been running.

## 2020-02-12 NOTE — TELEPHONE ENCOUNTER
Can you please find out what his BP has been running?  It looks like I had intended to order 25 mg daily but when it was sent it defaulted to half tablet.  If his blood pressure is stable on this dose, we can just continue it.

## 2020-02-12 NOTE — TELEPHONE ENCOUNTER
Reyna in your last note you stated you was starting him on Chlorthalidone 25mg once daily. Prescription came in for 1/2 tablet daily   radiology results

## 2020-02-12 NOTE — TELEPHONE ENCOUNTER
I spoke with him.  His blood pressure is well controlled in the 130s over 60s and his swelling is almost none.  I will refill his medication at the current dose.

## 2020-02-17 ENCOUNTER — HOSPITAL ENCOUNTER (OUTPATIENT)
Dept: CT IMAGING | Facility: HOSPITAL | Age: 70
Discharge: HOME | End: 2020-02-17
Attending: INTERNAL MEDICINE

## 2020-02-17 LAB
ALBUMIN SERPL-MCNC: 3.7 G/DL (ref 3.5–5)
ALBUMIN/GLOB SERPL: 1.4 {RATIO} (ref 1.4–2.6)
ALP SERPL-CCNC: 83 U/L (ref 56–155)
ALT SERPL-CCNC: 17 U/L (ref 10–40)
ANION GAP SERPL CALC-SCNC: 16 MMOL/L (ref 8–19)
AST SERPL-CCNC: 22 U/L (ref 15–50)
BASOPHILS # BLD AUTO: 0.03 10*3/UL (ref 0–0.2)
BASOPHILS # BLD: 0 % (ref 0–3)
BASOPHILS NFR BLD AUTO: 0.7 % (ref 0–3)
BILIRUB SERPL-MCNC: 0.4 MG/DL (ref 0.2–1.3)
BUN SERPL-MCNC: 15 MG/DL (ref 5–25)
BUN/CREAT SERPL: 17 {RATIO} (ref 6–20)
C3 FRG RBC-MCNC: ABNORMAL
CALCIUM SERPL-MCNC: 9.2 MG/DL (ref 8.7–10.4)
CHLORIDE SERPL-SCNC: 103 MMOL/L (ref 99–111)
CONV ABS BANDS: 0 % (ref 1–5)
CONV ABS IMM GRAN: 0.27 10*3/UL (ref 0–0.2)
CONV ANISOCYTES: SLIGHT
CONV CO2: 28 MMOL/L (ref 22–32)
CONV IMMATURE GRAN: 5.9 % (ref 0–1.8)
CONV SEGMENTED NEUTROPHILS: 37 % (ref 45–70)
CONV TOTAL PROTEIN: 6.3 G/DL (ref 6.3–8.2)
CREAT UR-MCNC: 0.87 MG/DL (ref 0.7–1.2)
DACRYOCYTES BLD QL SMEAR: ABNORMAL
DEPRECATED RDW RBC AUTO: 75.9 FL (ref 35.1–43.9)
EOSINOPHIL # BLD AUTO: 0.08 10*3/UL (ref 0–0.7)
EOSINOPHIL # BLD AUTO: 1.7 % (ref 0–7)
EOSINOPHIL NFR BLD AUTO: 2 % (ref 0–7)
ERYTHROCYTE [DISTWIDTH] IN BLOOD BY AUTOMATED COUNT: 20.4 % (ref 11.6–14.4)
GFR SERPLBLD BASED ON 1.73 SQ M-ARVRAT: >60 ML/MIN/{1.73_M2}
GLOBULIN UR ELPH-MCNC: 2.6 G/DL (ref 2–3.5)
GLUCOSE SERPL-MCNC: 84 MG/DL (ref 70–99)
HCT VFR BLD AUTO: 33.4 % (ref 42–52)
HGB BLD-MCNC: 10.4 G/DL (ref 14–18)
LYMPHOCYTES # BLD AUTO: 1.85 10*3/UL (ref 1–5)
LYMPHOCYTES NFR BLD AUTO: 40.2 % (ref 20–45)
MACROCYTES BLD QL SMEAR: ABNORMAL
MCH RBC QN AUTO: 33.1 PG (ref 27–31)
MCHC RBC AUTO-ENTMCNC: 31.1 G/DL (ref 33–37)
MCV RBC AUTO: 106.4 FL (ref 80–96)
MONOCYTES # BLD AUTO: 0.93 10*3/UL (ref 0.2–1.2)
MONOCYTES NFR BLD AUTO: 20.2 % (ref 3–10)
MONOCYTES NFR BLD MANUAL: 20 % (ref 3–10)
NEUTROPHILS # BLD AUTO: 1.44 10*3/UL (ref 2–8)
NEUTROPHILS NFR BLD AUTO: 31.3 % (ref 30–85)
NRBC CBCN: 4.6 % (ref 0–0.7)
NUC CELL # PRT MANUAL: 5 /100{WBCS}
OSMOLALITY SERPL CALC.SUM OF ELEC: 296 MOSM/KG (ref 273–304)
OVALOCYTES BLD QL SMEAR: SLIGHT
PLAT MORPH BLD: NORMAL
PLATELET # BLD AUTO: 254 10*3/UL (ref 130–400)
PMV BLD AUTO: 10.9 FL (ref 9.4–12.4)
POTASSIUM SERPL-SCNC: 3.7 MMOL/L (ref 3.5–5.3)
RBC # BLD AUTO: 3.14 10*6/UL (ref 4.7–6.1)
SMALL PLATELETS BLD QL SMEAR: ADEQUATE
SODIUM SERPL-SCNC: 143 MMOL/L (ref 135–147)
SPHEROCYTES BLD QL SMEAR: SLIGHT
VARIANT LYMPHS NFR BLD MANUAL: 41 % (ref 20–45)
WBC # BLD AUTO: 4.6 10*3/UL (ref 4.8–10.8)

## 2020-02-19 ENCOUNTER — OFFICE VISIT CONVERTED (OUTPATIENT)
Dept: ONCOLOGY | Facility: HOSPITAL | Age: 70
End: 2020-02-19
Attending: INTERNAL MEDICINE

## 2020-02-19 LAB
APPEARANCE UR: CLEAR
BILIRUB UR QL: NEGATIVE
COLOR UR: YELLOW
CONV COLLECTION SOURCE (UA): NORMAL
CONV UROBILINOGEN IN URINE BY AUTOMATED TEST STRIP: 0.2 {EHRLICHU}/DL (ref 0.1–1)
GLUCOSE UR QL: NEGATIVE MG/DL
HGB UR QL STRIP: NEGATIVE
KETONES UR QL STRIP: NEGATIVE MG/DL
LEUKOCYTE ESTERASE UR QL STRIP: NEGATIVE
NITRITE UR QL STRIP: NEGATIVE
PH UR STRIP.AUTO: 7.5 [PH] (ref 5–8)
PROT UR QL: NEGATIVE MG/DL
SP GR UR: 1.01 (ref 1–1.03)
T4 FREE SERPL-MCNC: 1.1 NG/DL (ref 0.9–1.8)
TSH SERPL-ACNC: 6.43 M[IU]/L (ref 0.27–4.2)

## 2020-02-26 LAB
BASOPHILS # BLD AUTO: 0.08 10*3/UL (ref 0–0.2)
BASOPHILS NFR BLD AUTO: 0.7 % (ref 0–3)
CONV ABS IMM GRAN: 0.14 10*3/UL (ref 0–0.54)
CONV EOSINOPHILS PERCENT BY MANUAL COUNT: 1.6 % (ref 0–7)
CONV IMMATURE GRAN: 1.2 % (ref 0–0.4)
EOSINOPHIL # BLD MANUAL: 0.19 10*3/UL (ref 0–0.7)
ERYTHROCYTE [DISTWIDTH] IN BLOOD BY AUTOMATED COUNT: 19.4 % (ref 11.5–14.5)
ERYTHROCYTE [DISTWIDTH] IN BLOOD BY AUTOMATED COUNT: 73.5 FL
HBA1C MFR BLD: 10.9 G/DL (ref 14–18)
HCT VFR BLD AUTO: 34.6 % (ref 42–52)
LYMPHOCYTES # BLD AUTO: 1.79 10*3/UL (ref 1–5)
LYMPHOCYTES NFR BLD AUTO: 14.9 % (ref 20–45)
MCH RBC QN AUTO: 32.9 PG (ref 27–31)
MCHC RBC AUTO-ENTMCNC: 31.5 G/DL (ref 33–37)
MCV RBC AUTO: 104.5 FL (ref 80–96)
MONOCYTES # BLD AUTO: 0.39 10*3/UL (ref 0.2–1.2)
MONOCYTES NFR BLD MANUAL: 3.2 % (ref 3–10)
NEUTROPHILS # BLD AUTO: 9.46 10*3/UL (ref 2–8)
NEUTROPHILS NFR BLD MANUAL: 78.4 % (ref 30–85)
PLATELET # BLD AUTO: 371 10*3/UL (ref 130–400)
PMV BLD AUTO: 10.6 FL (ref 7.4–10.4)
RBC MORPH BLD: 3.31 10*6/UL (ref 4.7–6.1)
WBC # BLD AUTO: 12.05 10*3/UL (ref 4.8–10.8)

## 2020-03-04 LAB
BASOPHILS # BLD AUTO: 0.02 10*3/UL (ref 0–0.2)
BASOPHILS # BLD: 0 % (ref 0–3)
BASOPHILS NFR BLD AUTO: 0.5 % (ref 0–3)
CONV ABS BANDS: 0 % (ref 1–5)
CONV ABS IMM GRAN: 0.03 10*3/UL (ref 0–0.54)
CONV ANISOCYTES: SLIGHT
CONV ATYPICAL LYMPHOCYTES: 2 % (ref 0–5)
CONV EOSINOPHILS PERCENT BY MANUAL COUNT: 3.5 % (ref 0–7)
CONV IMMATURE GRAN: 0.7 % (ref 0–0.4)
CONV SEGMENTED NEUTROPHILS: 68 % (ref 45–70)
EOSINOPHIL # BLD MANUAL: 0.15 10*3/UL (ref 0–0.7)
EOSINOPHIL NFR BLD AUTO: 2 % (ref 0–7)
ERYTHROCYTE [DISTWIDTH] IN BLOOD BY AUTOMATED COUNT: 19.8 % (ref 11.5–14.5)
ERYTHROCYTE [DISTWIDTH] IN BLOOD BY AUTOMATED COUNT: 74.7 FL
HBA1C MFR BLD: 10.5 G/DL (ref 14–18)
HCT VFR BLD AUTO: 33.3 % (ref 42–52)
LYMPHOCYTES # BLD AUTO: 1.34 10*3/UL (ref 1–5)
LYMPHOCYTES NFR BLD AUTO: 31.1 % (ref 20–45)
MACROCYTES BLD QL SMEAR: ABNORMAL
MCH RBC QN AUTO: 33.1 PG (ref 27–31)
MCHC RBC AUTO-ENTMCNC: 31.5 G/DL (ref 33–37)
MCV RBC AUTO: 105 FL (ref 80–96)
MONOCYTES # BLD AUTO: 0.17 10*3/UL (ref 0.2–1.2)
MONOCYTES NFR BLD MANUAL: 3.9 % (ref 3–10)
MONOCYTES NFR BLD MANUAL: 4 % (ref 3–10)
NEUTROPHILS # BLD AUTO: 2.6 10*3/UL (ref 2–8)
NEUTROPHILS NFR BLD MANUAL: 60.3 % (ref 30–85)
NUC CELL # PRT MANUAL: 0 /100{WBCS}
PATHOLOGY REVIEW: NORMAL
PLAT MORPH BLD: NORMAL
PLATELET # BLD AUTO: 290 10*3/UL (ref 130–400)
PMV BLD AUTO: 11.3 FL (ref 7.4–10.4)
RBC MORPH BLD: 3.17 10*6/UL (ref 4.7–6.1)
SMALL PLATELETS BLD QL SMEAR: ADEQUATE
SPHEROCYTES BLD QL SMEAR: SLIGHT
VARIANT LYMPHS NFR BLD MANUAL: 24 % (ref 20–45)
WBC # BLD AUTO: 4.31 10*3/UL (ref 4.8–10.8)

## 2020-03-18 ENCOUNTER — OFFICE VISIT CONVERTED (OUTPATIENT)
Dept: ONCOLOGY | Facility: HOSPITAL | Age: 70
End: 2020-03-18
Attending: INTERNAL MEDICINE

## 2020-03-18 LAB
ALBUMIN SERPL-MCNC: 3.6 G/DL (ref 3.5–5)
ALBUMIN/GLOB SERPL: 1.5 {RATIO} (ref 1.4–2.6)
ALP SERPL-CCNC: 89 U/L (ref 56–155)
ALT SERPL-CCNC: 18 U/L (ref 10–40)
ANION GAP SERPL CALC-SCNC: 9 MMOL/L (ref 8–19)
APPEARANCE UR: CLEAR
AST SERPL-CCNC: 23 U/L (ref 15–50)
BASOPHILS # BLD AUTO: 0.02 10*3/UL (ref 0–0.2)
BASOPHILS NFR BLD AUTO: 0.4 % (ref 0–3)
BILIRUB SERPL-MCNC: 0.63 MG/DL (ref 0.2–1.3)
BILIRUB UR QL: NEGATIVE
BUN SERPL-MCNC: 17 MG/DL (ref 5–25)
BUN/CREAT SERPL: 19 {RATIO} (ref 6–20)
CALCIUM SERPL-MCNC: 9 MG/DL (ref 8.7–10.4)
CALCIUM SPEC-SCNC: 8.7 MG/DL (ref 8.7–10.4)
CHLORIDE SERPL-SCNC: 104 MMOL/L (ref 99–111)
COLOR UR: YELLOW
CONV ABS IMM GRAN: 0.12 10*3/UL (ref 0–0.54)
CONV CO2: 29 MMOL/L (ref 22–32)
CONV COLLECTION SOURCE (UA): NORMAL
CONV EOSINOPHILS PERCENT BY MANUAL COUNT: 1 % (ref 0–7)
CONV IMMATURE GRAN: 2.4 % (ref 0–0.4)
CONV TOTAL PROTEIN: 6 G/DL (ref 6.3–8.2)
CONV UROBILINOGEN IN URINE BY AUTOMATED TEST STRIP: 0.2 {EHRLICHU}/DL (ref 0.1–1)
CREAT UR-MCNC: 0.89 MG/DL (ref 0.7–1.2)
EOSINOPHIL # BLD MANUAL: 0.05 10*3/UL (ref 0–0.7)
ERYTHROCYTE [DISTWIDTH] IN BLOOD BY AUTOMATED COUNT: 21.4 % (ref 11.5–14.5)
ERYTHROCYTE [DISTWIDTH] IN BLOOD BY AUTOMATED COUNT: 85.8 FL
GFR SERPLBLD BASED ON 1.73 SQ M-ARVRAT: >60 ML/MIN/{1.73_M2}
GLOBULIN UR ELPH-MCNC: 2.4 G/DL (ref 2–3.5)
GLUCOSE SERPL-MCNC: 92 MG/DL (ref 70–99)
GLUCOSE UR QL: NEGATIVE MG/DL
HBA1C MFR BLD: 10.3 G/DL (ref 14–18)
HCT VFR BLD AUTO: 33.2 % (ref 42–52)
HGB UR QL STRIP: NEGATIVE
KETONES UR QL STRIP: NEGATIVE MG/DL
LEUKOCYTE ESTERASE UR QL STRIP: NEGATIVE
LYMPHOCYTES # BLD AUTO: 1.34 10*3/UL (ref 1–5)
LYMPHOCYTES NFR BLD AUTO: 27 % (ref 20–45)
MCH RBC QN AUTO: 33.4 PG (ref 27–31)
MCHC RBC AUTO-ENTMCNC: 31 G/DL (ref 33–37)
MCV RBC AUTO: 107.8 FL (ref 80–96)
MONOCYTES # BLD AUTO: 1.04 10*3/UL (ref 0.2–1.2)
MONOCYTES NFR BLD MANUAL: 21 % (ref 3–10)
NEUTROPHILS # BLD AUTO: 2.39 10*3/UL (ref 2–8)
NEUTROPHILS NFR BLD MANUAL: 48.2 % (ref 30–85)
NITRITE UR QL STRIP: NEGATIVE
OSMOLALITY SERPL CALC.SUM OF ELEC: 287 MOSM/KG (ref 273–304)
PH UR STRIP.AUTO: 6.5 [PH] (ref 5–8)
PLATELET # BLD AUTO: 250 10*3/UL (ref 130–400)
PMV BLD AUTO: 10.8 FL (ref 7.4–10.4)
POTASSIUM SERPL-SCNC: 3.9 MMOL/L (ref 3.5–5.3)
PROT UR QL: NEGATIVE MG/DL
RBC MORPH BLD: 3.08 10*6/UL (ref 4.7–6.1)
SODIUM SERPL-SCNC: 138 MMOL/L (ref 135–147)
SP GR UR: 1.01 (ref 1–1.03)
WBC # BLD AUTO: 4.96 10*3/UL (ref 4.8–10.8)

## 2020-03-25 LAB
BASOPHILS # BLD AUTO: 0.03 10*3/UL (ref 0–0.2)
BASOPHILS # BLD: 1 % (ref 0–3)
BASOPHILS NFR BLD AUTO: 0.5 % (ref 0–3)
CLUMPED PLATELETS: NORMAL
CONV ABS BANDS: 3 % (ref 1–5)
CONV ABS IMM GRAN: 0.03 10*3/UL (ref 0–0.54)
CONV ANISOCYTES: NORMAL
CONV ATYPICAL LYMPHOCYTES: 4 % (ref 0–5)
CONV EOSINOPHILS PERCENT BY MANUAL COUNT: 2.1 % (ref 0–7)
CONV HYPOCHROMIA IN BLOOD BY LIGHT MICROSCOPY: NORMAL
CONV IMMATURE GRAN: 0.5 % (ref 0–0.4)
CONV SEGMENTED NEUTROPHILS: 63 % (ref 45–70)
DACRYOCYTES BLD QL SMEAR: NORMAL
EOSINOPHIL # BLD MANUAL: 0.14 10*3/UL (ref 0–0.7)
EOSINOPHIL NFR BLD AUTO: 2 % (ref 0–7)
ERYTHROCYTE [DISTWIDTH] IN BLOOD BY AUTOMATED COUNT: 19.7 % (ref 11.5–14.5)
ERYTHROCYTE [DISTWIDTH] IN BLOOD BY AUTOMATED COUNT: 76.4 FL
HBA1C MFR BLD: 9.3 G/DL (ref 14–18)
HCT VFR BLD AUTO: 29.3 % (ref 42–52)
LYMPHOCYTES # BLD AUTO: 1.6 10*3/UL (ref 1–5)
LYMPHOCYTES NFR BLD AUTO: 24.1 % (ref 20–45)
MACROCYTES BLD QL SMEAR: NORMAL
MCH RBC QN AUTO: 33.5 PG (ref 27–31)
MCHC RBC AUTO-ENTMCNC: 31.7 G/DL (ref 33–37)
MCV RBC AUTO: 105.4 FL (ref 80–96)
METAMYELOCYTES NFR BLD MANUAL: 1 %
MONOCYTES # BLD AUTO: 0.2 10*3/UL (ref 0.2–1.2)
MONOCYTES NFR BLD MANUAL: 3 % (ref 3–10)
MONOCYTES NFR BLD MANUAL: 5 % (ref 3–10)
NEUTROPHILS # BLD AUTO: 4.65 10*3/UL (ref 2–8)
NEUTROPHILS NFR BLD MANUAL: 69.8 % (ref 30–85)
NUC CELL # PRT MANUAL: 0 /100{WBCS}
OVALOCYTES BLD QL SMEAR: NORMAL
PATHOLOGY REVIEW: NORMAL
PLAT MORPH BLD: NORMAL
PLATELET # BLD AUTO: 188 10*3/UL (ref 130–400)
PMV BLD AUTO: 12.2 FL (ref 7.4–10.4)
RBC MORPH BLD: 2.78 10*6/UL (ref 4.7–6.1)
SMALL PLATELETS BLD QL SMEAR: ADEQUATE
SPHEROCYTES BLD QL SMEAR: NORMAL
VARIANT LYMPHS NFR BLD MANUAL: 21 % (ref 20–45)
WBC # BLD AUTO: 6.65 10*3/UL (ref 4.8–10.8)

## 2020-04-01 LAB
BASOPHILS # BLD AUTO: 0.05 10*3/UL (ref 0–0.2)
BASOPHILS # BLD: 3 % (ref 0–3)
BASOPHILS NFR BLD AUTO: 2.1 % (ref 0–3)
C3 FRG RBC-MCNC: ABNORMAL
CLUMPED PLATELETS: ABNORMAL
CONV ABS BANDS: 0 % (ref 1–5)
CONV ABS IMM GRAN: 0.01 10*3/UL (ref 0–0.54)
CONV ANISOCYTES: SLIGHT
CONV ATYPICAL LYMPHOCYTES: 5 % (ref 0–5)
CONV EOSINOPHILS PERCENT BY MANUAL COUNT: 3.8 % (ref 0–7)
CONV HYPOCHROMIA IN BLOOD BY LIGHT MICROSCOPY: SLIGHT
CONV IMMATURE GRAN: 0.4 % (ref 0–0.4)
CONV SEGMENTED NEUTROPHILS: 19 % (ref 45–70)
DACRYOCYTES BLD QL SMEAR: SLIGHT
EOSINOPHIL # BLD MANUAL: 0.09 10*3/UL (ref 0–0.7)
EOSINOPHIL NFR BLD AUTO: 4 % (ref 0–7)
ERYTHROCYTE [DISTWIDTH] IN BLOOD BY AUTOMATED COUNT: 19.3 % (ref 11.5–14.5)
ERYTHROCYTE [DISTWIDTH] IN BLOOD BY AUTOMATED COUNT: 72.9 FL
GIANT PLATELETS: ABNORMAL
HBA1C MFR BLD: 9.2 G/DL (ref 14–18)
HCT VFR BLD AUTO: 28.7 % (ref 42–52)
HYPOGRANULAR PLATELETS: ABNORMAL
LARGE PLATELETS: SLIGHT
LYMPHOCYTES # BLD AUTO: 1.58 10*3/UL (ref 1–5)
LYMPHOCYTES NFR BLD AUTO: 66.9 % (ref 20–45)
MACROCYTES BLD QL SMEAR: ABNORMAL
MCH RBC QN AUTO: 33.5 PG (ref 27–31)
MCHC RBC AUTO-ENTMCNC: 32.1 G/DL (ref 33–37)
MCV RBC AUTO: 104.4 FL (ref 80–96)
MICROCYTES BLD QL: ABNORMAL
MONOCYTES # BLD AUTO: 0.1 10*3/UL (ref 0.2–1.2)
MONOCYTES NFR BLD MANUAL: 2 % (ref 3–10)
MONOCYTES NFR BLD MANUAL: 4.2 % (ref 3–10)
NEUTROPHILS # BLD AUTO: 0.53 10*3/UL (ref 2–8)
NEUTROPHILS NFR BLD MANUAL: 22.6 % (ref 30–85)
NRBC BLD AUTO-RTO: PRESENT %
NUC CELL # PRT MANUAL: 2 /100{WBCS}
OVALOCYTES BLD QL SMEAR: SLIGHT
PATHOLOGY REVIEW: NORMAL
PLAT MORPH BLD: NORMAL
PLATELET # BLD AUTO: 315 10*3/UL (ref 130–400)
PMV BLD AUTO: 11.9 FL (ref 7.4–10.4)
POIKILOCYTOSIS BLD QL SMEAR: SLIGHT
RBC MORPH BLD: 2.75 10*6/UL (ref 4.7–6.1)
SMALL PLATELETS BLD QL SMEAR: ADEQUATE
SMUDGE CELLS IN BLOOD BY LIGHT MICROSCOPY: ABNORMAL
SPHEROCYTES BLD QL SMEAR: ABNORMAL
VARIANT LYMPHS NFR BLD MANUAL: 67 % (ref 20–45)
WBC # BLD AUTO: 2.36 10*3/UL (ref 4.8–10.8)

## 2020-04-08 ENCOUNTER — OFFICE VISIT CONVERTED (OUTPATIENT)
Dept: ONCOLOGY | Facility: HOSPITAL | Age: 70
End: 2020-04-08
Attending: INTERNAL MEDICINE

## 2020-04-08 LAB
ALBUMIN SERPL-MCNC: 3.6 G/DL (ref 3.5–5)
ALBUMIN/GLOB SERPL: 1.4 {RATIO} (ref 1.4–2.6)
ALP SERPL-CCNC: 88 U/L (ref 56–155)
ALT SERPL-CCNC: 15 U/L (ref 10–40)
ANION GAP SERPL CALC-SCNC: 12 MMOL/L (ref 8–19)
APPEARANCE UR: CLEAR
AST SERPL-CCNC: 25 U/L (ref 15–50)
BASOPHILS # BLD AUTO: 0.03 10*3/UL (ref 0–0.2)
BASOPHILS # BLD AUTO: 0.07 10*3/UL (ref 0–0.2)
BASOPHILS # BLD: 1 % (ref 0–3)
BASOPHILS NFR BLD AUTO: 0.6 % (ref 0–3)
BASOPHILS NFR BLD AUTO: 1.4 % (ref 0–3)
BILIRUB SERPL-MCNC: 0.73 MG/DL (ref 0.2–1.3)
BILIRUB UR QL: NEGATIVE
BUN SERPL-MCNC: 20 MG/DL (ref 5–25)
BUN/CREAT SERPL: 19 {RATIO} (ref 6–20)
CALCIUM SERPL-MCNC: 9.1 MG/DL (ref 8.7–10.4)
CALCIUM SPEC-SCNC: 8.8 MG/DL (ref 8.7–10.4)
CHLORIDE SERPL-SCNC: 100 MMOL/L (ref 99–111)
CLUMPED PLATELETS: ABNORMAL
COLOR UR: YELLOW
CONV ABS BANDS: 11 % (ref 1–5)
CONV ABS IMM GRAN: 0.41 10*3/UL (ref 0–0.54)
CONV ABS IMM GRAN: 0.42 10*3/UL (ref 0–0.2)
CONV ANISOCYTES: SLIGHT
CONV BASOPHILIC STIPPLING IN BLOOD BY LIGHT MICROSCOPY: SLIGHT
CONV CO2: 27 MMOL/L (ref 22–32)
CONV COLLECTION SOURCE (UA): NORMAL
CONV EOSINOPHILS PERCENT BY MANUAL COUNT: 0.9 % (ref 0–7)
CONV IMMATURE GRAN: 7.6 % (ref 0–0.4)
CONV IMMATURE GRAN: 8.1 % (ref 0–1.8)
CONV SEGMENTED NEUTROPHILS: 23 % (ref 45–70)
CONV TOTAL PROTEIN: 6.1 G/DL (ref 6.3–8.2)
CONV UROBILINOGEN IN URINE BY AUTOMATED TEST STRIP: 0.2 {EHRLICHU}/DL (ref 0.1–1)
CREAT UR-MCNC: 1.06 MG/DL (ref 0.7–1.2)
DACRYOCYTES BLD QL SMEAR: SLIGHT
DEPRECATED RDW RBC AUTO: 84.3 FL (ref 35.1–43.9)
EOSINOPHIL # BLD AUTO: 0.09 10*3/UL (ref 0–0.7)
EOSINOPHIL # BLD AUTO: 1.7 % (ref 0–7)
EOSINOPHIL # BLD MANUAL: 0.05 10*3/UL (ref 0–0.7)
EOSINOPHIL NFR BLD AUTO: 0 % (ref 0–7)
ERYTHROCYTE [DISTWIDTH] IN BLOOD BY AUTOMATED COUNT: 21.6 % (ref 11.5–14.5)
ERYTHROCYTE [DISTWIDTH] IN BLOOD BY AUTOMATED COUNT: 21.7 % (ref 11.6–14.4)
ERYTHROCYTE [DISTWIDTH] IN BLOOD BY AUTOMATED COUNT: 84.4 FL
GFR SERPLBLD BASED ON 1.73 SQ M-ARVRAT: >60 ML/MIN/{1.73_M2}
GIANT PLATELETS: ABNORMAL
GLOBULIN UR ELPH-MCNC: 2.5 G/DL (ref 2–3.5)
GLUCOSE SERPL-MCNC: 87 MG/DL (ref 70–99)
GLUCOSE UR QL: NEGATIVE MG/DL
HBA1C MFR BLD: 9.9 G/DL (ref 14–18)
HCT VFR BLD AUTO: 31.7 % (ref 42–52)
HCT VFR BLD AUTO: 31.7 % (ref 42–52)
HGB BLD-MCNC: 9.8 G/DL (ref 14–18)
HGB UR QL STRIP: NEGATIVE
KETONES UR QL STRIP: NEGATIVE MG/DL
LARGE PLATELETS: SLIGHT
LEUKOCYTE ESTERASE UR QL STRIP: NEGATIVE
LYMPHOCYTES # BLD AUTO: 1.86 10*3/UL (ref 1–5)
LYMPHOCYTES # BLD AUTO: 2.09 10*3/UL (ref 1–5)
LYMPHOCYTES NFR BLD AUTO: 36 % (ref 20–45)
LYMPHOCYTES NFR BLD AUTO: 38.5 % (ref 20–45)
MACROCYTES BLD QL SMEAR: ABNORMAL
MCH RBC QN AUTO: 33.3 PG (ref 27–31)
MCH RBC QN AUTO: 33.8 PG (ref 27–31)
MCHC RBC AUTO-ENTMCNC: 30.9 G/DL (ref 33–37)
MCHC RBC AUTO-ENTMCNC: 31.2 G/DL (ref 33–37)
MCV RBC AUTO: 107.8 FL (ref 80–96)
MCV RBC AUTO: 108.2 FL (ref 80–96)
METAMYELOCYTES NFR BLD MANUAL: 4 %
MONOCYTES # BLD AUTO: 0.93 10*3/UL (ref 0.2–1.2)
MONOCYTES # BLD AUTO: 1.02 10*3/UL (ref 0.2–1.2)
MONOCYTES NFR BLD AUTO: 18 % (ref 3–10)
MONOCYTES NFR BLD MANUAL: 18.8 % (ref 3–10)
MONOCYTES NFR BLD MANUAL: 7 % (ref 3–10)
MYELOCYTES TOTAL PER COUNTED LEUKOCYTES BY MANUAL COUN: 3 %
NEUTROPHILS # BLD AUTO: 1.79 10*3/UL (ref 2–8)
NEUTROPHILS # BLD AUTO: 1.83 10*3/UL (ref 2–8)
NEUTROPHILS NFR BLD AUTO: 34.8 % (ref 30–85)
NEUTROPHILS NFR BLD MANUAL: 33.6 % (ref 30–85)
NITRITE UR QL STRIP: NEGATIVE
NRBC BLD AUTO-RTO: PRESENT %
NRBC CBCN: 5.8 % (ref 0–0.7)
NUC CELL # PRT MANUAL: 8 /100{WBCS}
OSMOLALITY SERPL CALC.SUM OF ELEC: 282 MOSM/KG (ref 273–304)
OVALOCYTES BLD QL SMEAR: SLIGHT
PATHOLOGY REVIEW: NORMAL
PH UR STRIP.AUTO: 6 [PH] (ref 5–8)
PLAT MORPH BLD: NORMAL
PLATELET # BLD AUTO: 236 10*3/UL (ref 130–400)
PLATELET # BLD AUTO: 266 10*3/UL (ref 130–400)
PMV BLD AUTO: 10.4 FL (ref 9.4–12.4)
PMV BLD AUTO: 9.7 FL (ref 7.4–10.4)
POIKILOCYTOSIS BLD QL SMEAR: SLIGHT
POLYCHROMASIA BLD QL SMEAR: ABNORMAL
POTASSIUM SERPL-SCNC: 3.7 MMOL/L (ref 3.5–5.3)
PROMYELOCYTES NFR BLD MANUAL: 1 %
PROT UR QL: NEGATIVE MG/DL
RBC # BLD AUTO: 2.94 10*6/UL (ref 4.7–6.1)
RBC MORPH BLD: 2.93 10*6/UL (ref 4.7–6.1)
SMALL PLATELETS BLD QL SMEAR: ADEQUATE
SMUDGE CELLS IN BLOOD BY LIGHT MICROSCOPY: SLIGHT
SODIUM SERPL-SCNC: 135 MMOL/L (ref 135–147)
SP GR UR: 1.01 (ref 1–1.03)
SPHEROCYTES BLD QL SMEAR: ABNORMAL
T4 FREE SERPL-MCNC: 1.1 NG/DL (ref 0.9–1.8)
TOXIC GRANULATION: SLIGHT
TSH SERPL-ACNC: 9.48 M[IU]/L (ref 0.27–4.2)
VARIANT LYMPHS NFR BLD MANUAL: 50 % (ref 20–45)
WBC # BLD AUTO: 5.16 10*3/UL (ref 4.8–10.8)
WBC # BLD AUTO: 5.43 10*3/UL (ref 4.8–10.8)

## 2020-04-15 LAB
BASOPHILS # BLD AUTO: 0.07 10*3/UL (ref 0–0.2)
BASOPHILS NFR BLD AUTO: 0.8 % (ref 0–3)
CONV ABS IMM GRAN: 0.09 10*3/UL (ref 0–0.54)
CONV EOSINOPHILS PERCENT BY MANUAL COUNT: 1.4 % (ref 0–7)
CONV IMMATURE GRAN: 1 % (ref 0–0.4)
EOSINOPHIL # BLD MANUAL: 0.13 10*3/UL (ref 0–0.7)
ERYTHROCYTE [DISTWIDTH] IN BLOOD BY AUTOMATED COUNT: 20.1 % (ref 11.5–14.5)
ERYTHROCYTE [DISTWIDTH] IN BLOOD BY AUTOMATED COUNT: 79.5 FL
HBA1C MFR BLD: 9.8 G/DL (ref 14–18)
HCT VFR BLD AUTO: 31.2 % (ref 42–52)
LYMPHOCYTES # BLD AUTO: 1.62 10*3/UL (ref 1–5)
LYMPHOCYTES NFR BLD AUTO: 17.6 % (ref 20–45)
MCH RBC QN AUTO: 33.3 PG (ref 27–31)
MCHC RBC AUTO-ENTMCNC: 31.4 G/DL (ref 33–37)
MCV RBC AUTO: 106.1 FL (ref 80–96)
MONOCYTES # BLD AUTO: 0.19 10*3/UL (ref 0.2–1.2)
MONOCYTES NFR BLD MANUAL: 2.1 % (ref 3–10)
NEUTROPHILS # BLD AUTO: 7.1 10*3/UL (ref 2–8)
NEUTROPHILS NFR BLD MANUAL: 77.1 % (ref 30–85)
PATHOLOGY REVIEW: NORMAL
PLATELET # BLD AUTO: 249 10*3/UL (ref 130–400)
PMV BLD AUTO: 11.5 FL (ref 7.4–10.4)
RBC MORPH BLD: 2.94 10*6/UL (ref 4.7–6.1)
WBC # BLD AUTO: 9.2 10*3/UL (ref 4.8–10.8)

## 2020-04-22 LAB
BASOPHILS # BLD AUTO: 0.03 10*3/UL (ref 0–0.2)
BASOPHILS NFR BLD AUTO: 1.4 % (ref 0–3)
CONV ABS IMM GRAN: 0 10*3/UL (ref 0–0.54)
CONV EOSINOPHILS PERCENT BY MANUAL COUNT: 4.8 % (ref 0–7)
CONV IMMATURE GRAN: 0 % (ref 0–0.4)
EOSINOPHIL # BLD MANUAL: 0.1 10*3/UL (ref 0–0.7)
ERYTHROCYTE [DISTWIDTH] IN BLOOD BY AUTOMATED COUNT: 19.4 % (ref 11.5–14.5)
ERYTHROCYTE [DISTWIDTH] IN BLOOD BY AUTOMATED COUNT: 74.6 FL
HBA1C MFR BLD: 8.6 G/DL (ref 14–18)
HCT VFR BLD AUTO: 27.3 % (ref 42–52)
LYMPHOCYTES # BLD AUTO: 1.38 10*3/UL (ref 1–5)
LYMPHOCYTES NFR BLD AUTO: 65.7 % (ref 20–45)
MCH RBC QN AUTO: 33.5 PG (ref 27–31)
MCHC RBC AUTO-ENTMCNC: 31.5 G/DL (ref 33–37)
MCV RBC AUTO: 106.2 FL (ref 80–96)
MONOCYTES # BLD AUTO: 0.08 10*3/UL (ref 0.2–1.2)
MONOCYTES NFR BLD MANUAL: 3.8 % (ref 3–10)
NEUTROPHILS # BLD AUTO: 0.51 10*3/UL (ref 2–8)
NEUTROPHILS NFR BLD MANUAL: 24.3 % (ref 30–85)
PLATELET # BLD AUTO: 245 10*3/UL (ref 130–400)
PMV BLD AUTO: 10.9 FL (ref 7.4–10.4)
RBC MORPH BLD: 2.57 10*6/UL (ref 4.7–6.1)
WBC # BLD AUTO: 2.1 10*3/UL (ref 4.8–10.8)

## 2020-05-04 ENCOUNTER — HOSPITAL ENCOUNTER (OUTPATIENT)
Dept: CT IMAGING | Facility: HOSPITAL | Age: 70
Discharge: HOME | End: 2020-05-04
Attending: INTERNAL MEDICINE

## 2020-05-04 LAB
ALBUMIN SERPL-MCNC: 3.7 G/DL (ref 3.5–5)
ALBUMIN/GLOB SERPL: 1.3 {RATIO} (ref 1.4–2.6)
ALP SERPL-CCNC: 93 U/L (ref 56–155)
ALT SERPL-CCNC: 14 U/L (ref 10–40)
ANION GAP SERPL CALC-SCNC: 16 MMOL/L (ref 8–19)
AST SERPL-CCNC: 23 U/L (ref 15–50)
BASOPHILS # BLD AUTO: 0.11 10*3/UL (ref 0–0.2)
BASOPHILS NFR BLD AUTO: 0.8 % (ref 0–3)
BILIRUB SERPL-MCNC: 0.53 MG/DL (ref 0.2–1.3)
BUN SERPL-MCNC: 18 MG/DL (ref 5–25)
BUN/CREAT SERPL: 18 {RATIO} (ref 6–20)
BURR CELLS BLD QL SMEAR: NORMAL
C3 FRG RBC-MCNC: NORMAL
CALCIUM SERPL-MCNC: 9.5 MG/DL (ref 8.7–10.4)
CHLORIDE SERPL-SCNC: 101 MMOL/L (ref 99–111)
CONV ABS IMM GRAN: 0.28 10*3/UL (ref 0–0.2)
CONV ANISOCYTES: NORMAL
CONV BASOPHILIC STIPPLING IN BLOOD BY LIGHT MICROSCOPY: SLIGHT
CONV CO2: 28 MMOL/L (ref 22–32)
CONV HYPOCHROMIA IN BLOOD BY LIGHT MICROSCOPY: SLIGHT
CONV IMMATURE GRAN: 2 % (ref 0–1.8)
CONV TOTAL PROTEIN: 6.5 G/DL (ref 6.3–8.2)
CREAT BLD-MCNC: 1 MG/DL (ref 0.6–1.4)
CREAT UR-MCNC: 0.98 MG/DL (ref 0.7–1.2)
DACRYOCYTES BLD QL SMEAR: SLIGHT
DEPRECATED RDW RBC AUTO: 91.9 FL (ref 35.1–43.9)
EOSINOPHIL # BLD AUTO: 0.1 10*3/UL (ref 0–0.7)
EOSINOPHIL # BLD AUTO: 0.7 % (ref 0–7)
ERYTHROCYTE [DISTWIDTH] IN BLOOD BY AUTOMATED COUNT: 22.5 % (ref 11.6–14.4)
GFR SERPLBLD BASED ON 1.73 SQ M-ARVRAT: >60 ML/MIN/{1.73_M2}
GFR SERPLBLD BASED ON 1.73 SQ M-ARVRAT: >60 ML/MIN/{1.73_M2}
GLOBULIN UR ELPH-MCNC: 2.8 G/DL (ref 2–3.5)
GLUCOSE SERPL-MCNC: 113 MG/DL (ref 70–99)
HCT VFR BLD AUTO: 34.9 % (ref 42–52)
HGB BLD-MCNC: 10.6 G/DL (ref 14–18)
LYMPHOCYTES # BLD AUTO: 2.23 10*3/UL (ref 1–5)
LYMPHOCYTES NFR BLD AUTO: 15.7 % (ref 20–45)
MACROCYTES BLD QL SMEAR: NORMAL
MCH RBC QN AUTO: 33.9 PG (ref 27–31)
MCHC RBC AUTO-ENTMCNC: 30.4 G/DL (ref 33–37)
MCV RBC AUTO: 111.5 FL (ref 80–96)
MICROCYTES BLD QL: SLIGHT
MONOCYTES # BLD AUTO: 1.44 10*3/UL (ref 0.2–1.2)
MONOCYTES NFR BLD AUTO: 10.1 % (ref 3–10)
NEUTROPHILS # BLD AUTO: 10.06 10*3/UL (ref 2–8)
NEUTROPHILS NFR BLD AUTO: 70.7 % (ref 30–85)
NRBC CBCN: 1.6 % (ref 0–0.7)
OSMOLALITY SERPL CALC.SUM OF ELEC: 295 MOSM/KG (ref 273–304)
OVALOCYTES BLD QL SMEAR: SLIGHT
PLATELET # BLD AUTO: 219 10*3/UL (ref 130–400)
PMV BLD AUTO: 10.2 FL (ref 9.4–12.4)
POIKILOCYTOSIS BLD QL SMEAR: SLIGHT
POLYCHROMASIA BLD QL SMEAR: NORMAL
POTASSIUM SERPL-SCNC: 4.4 MMOL/L (ref 3.5–5.3)
RBC # BLD AUTO: 3.13 10*6/UL (ref 4.7–6.1)
SODIUM SERPL-SCNC: 141 MMOL/L (ref 135–147)
SPHEROCYTES BLD QL SMEAR: NORMAL
WBC # BLD AUTO: 14.22 10*3/UL (ref 4.8–10.8)

## 2020-05-06 ENCOUNTER — OFFICE VISIT CONVERTED (OUTPATIENT)
Dept: ONCOLOGY | Facility: HOSPITAL | Age: 70
End: 2020-05-06
Attending: INTERNAL MEDICINE

## 2020-05-06 ENCOUNTER — HOSPITAL ENCOUNTER (OUTPATIENT)
Dept: OTHER | Facility: HOSPITAL | Age: 70
Setting detail: RECURRING SERIES
Discharge: STILL ADMITTED OR ADMINISTRATIVE | End: 2020-07-24
Attending: INTERNAL MEDICINE

## 2020-05-06 LAB
APPEARANCE UR: CLEAR
BILIRUB UR QL: NEGATIVE
COLOR UR: NORMAL
CONV COLLECTION SOURCE (UA): NORMAL
CONV UROBILINOGEN IN URINE BY AUTOMATED TEST STRIP: 0.2 {EHRLICHU}/DL (ref 0.1–1)
GLUCOSE UR QL: NEGATIVE MG/DL
HGB UR QL STRIP: NEGATIVE
KETONES UR QL STRIP: NEGATIVE MG/DL
LEUKOCYTE ESTERASE UR QL STRIP: NEGATIVE
NITRITE UR QL STRIP: NEGATIVE
PH UR STRIP.AUTO: 7 [PH] (ref 5–8)
PROT UR QL: NEGATIVE MG/DL
SP GR UR: 1.01 (ref 1–1.03)
T4 FREE SERPL-MCNC: 1 NG/DL (ref 0.9–1.8)
TSH SERPL-ACNC: 13.5 M[IU]/L (ref 0.27–4.2)

## 2020-05-13 LAB
BASOPHILS # BLD AUTO: 0.09 10*3/UL (ref 0–0.2)
BASOPHILS NFR BLD AUTO: 1.1 % (ref 0–3)
CONV ABS IMM GRAN: 0.07 10*3/UL (ref 0–0.54)
CONV EOSINOPHILS PERCENT BY MANUAL COUNT: 4.2 % (ref 0–7)
CONV IMMATURE GRAN: 0.8 % (ref 0–0.4)
EOSINOPHIL # BLD MANUAL: 0.36 10*3/UL (ref 0–0.7)
ERYTHROCYTE [DISTWIDTH] IN BLOOD BY AUTOMATED COUNT: 19.6 % (ref 11.5–14.5)
ERYTHROCYTE [DISTWIDTH] IN BLOOD BY AUTOMATED COUNT: 79.6 FL
HBA1C MFR BLD: 9.7 G/DL (ref 14–18)
HCT VFR BLD AUTO: 31.4 % (ref 42–52)
LYMPHOCYTES # BLD AUTO: 1.53 10*3/UL (ref 1–5)
LYMPHOCYTES NFR BLD AUTO: 18 % (ref 20–45)
MCH RBC QN AUTO: 33.8 PG (ref 27–31)
MCHC RBC AUTO-ENTMCNC: 30.9 G/DL (ref 33–37)
MCV RBC AUTO: 109.4 FL (ref 80–96)
MONOCYTES # BLD AUTO: 0.28 10*3/UL (ref 0.2–1.2)
MONOCYTES NFR BLD MANUAL: 3.3 % (ref 3–10)
NEUTROPHILS # BLD AUTO: 6.19 10*3/UL (ref 2–8)
NEUTROPHILS NFR BLD MANUAL: 72.6 % (ref 30–85)
PATHOLOGY REVIEW: NORMAL
PLATELET # BLD AUTO: 234 10*3/UL (ref 130–400)
PMV BLD AUTO: 10.9 FL (ref 7.4–10.4)
RBC MORPH BLD: 2.87 10*6/UL (ref 4.7–6.1)
WBC # BLD AUTO: 8.52 10*3/UL (ref 4.8–10.8)

## 2020-05-20 LAB
BASOPHILS # BLD AUTO: 0.01 10*3/UL (ref 0–0.2)
BASOPHILS NFR BLD AUTO: 0.3 % (ref 0–3)
CONV ABS IMM GRAN: 0.01 10*3/UL (ref 0–0.54)
CONV EOSINOPHILS PERCENT BY MANUAL COUNT: 6.1 % (ref 0–7)
CONV IMMATURE GRAN: 0.3 % (ref 0–0.4)
EOSINOPHIL # BLD MANUAL: 0.2 10*3/UL (ref 0–0.7)
ERYTHROCYTE [DISTWIDTH] IN BLOOD BY AUTOMATED COUNT: 19.4 % (ref 11.5–14.5)
ERYTHROCYTE [DISTWIDTH] IN BLOOD BY AUTOMATED COUNT: 75.8 FL
HBA1C MFR BLD: 9.1 G/DL (ref 14–18)
HCT VFR BLD AUTO: 28.7 % (ref 42–52)
LYMPHOCYTES # BLD AUTO: 1.65 10*3/UL (ref 1–5)
LYMPHOCYTES NFR BLD AUTO: 50.3 % (ref 20–45)
MCH RBC QN AUTO: 34.2 PG (ref 27–31)
MCHC RBC AUTO-ENTMCNC: 31.7 G/DL (ref 33–37)
MCV RBC AUTO: 107.9 FL (ref 80–96)
MONOCYTES # BLD AUTO: 0.08 10*3/UL (ref 0.2–1.2)
MONOCYTES NFR BLD MANUAL: 2.4 % (ref 3–10)
NEUTROPHILS # BLD AUTO: 1.33 10*3/UL (ref 2–8)
NEUTROPHILS NFR BLD MANUAL: 40.6 % (ref 30–85)
PLATELET # BLD AUTO: 273 10*3/UL (ref 130–400)
PMV BLD AUTO: 11.5 FL (ref 7.4–10.4)
RBC MORPH BLD: 2.66 10*6/UL (ref 4.7–6.1)
WBC # BLD AUTO: 3.28 10*3/UL (ref 4.8–10.8)

## 2020-06-03 ENCOUNTER — OFFICE VISIT CONVERTED (OUTPATIENT)
Dept: ONCOLOGY | Facility: HOSPITAL | Age: 70
End: 2020-06-03
Attending: INTERNAL MEDICINE

## 2020-06-03 LAB
ALBUMIN SERPL-MCNC: 3.6 G/DL (ref 3.5–5)
ALBUMIN/GLOB SERPL: 1.4 {RATIO} (ref 1.4–2.6)
ALP SERPL-CCNC: 93 U/L (ref 56–155)
ALT SERPL-CCNC: 14 U/L (ref 10–40)
ANION GAP SERPL CALC-SCNC: 12 MMOL/L (ref 8–19)
APPEARANCE UR: CLEAR
AST SERPL-CCNC: 18 U/L (ref 15–50)
BASOPHILS # BLD AUTO: 0.02 10*3/UL (ref 0–0.2)
BASOPHILS # BLD AUTO: 0.06 10*3/UL (ref 0–0.2)
BASOPHILS # BLD: 1 % (ref 0–3)
BASOPHILS NFR BLD AUTO: 0.4 % (ref 0–3)
BASOPHILS NFR BLD AUTO: 1.1 % (ref 0–3)
BILIRUB SERPL-MCNC: 0.41 MG/DL (ref 0.2–1.3)
BILIRUB UR QL: NEGATIVE
BUN SERPL-MCNC: 16 MG/DL (ref 5–25)
BUN/CREAT SERPL: 17 {RATIO} (ref 6–20)
CALCIUM SERPL-MCNC: 9.3 MG/DL (ref 8.7–10.4)
CALCIUM SPEC-SCNC: 9 MG/DL (ref 8.7–10.4)
CHLORIDE SERPL-SCNC: 103 MMOL/L (ref 99–111)
COLOR UR: YELLOW
CONV ABS BANDS: 0 % (ref 1–5)
CONV ABS IMM GRAN: 0.12 10*3/UL (ref 0–0.54)
CONV ABS IMM GRAN: 0.13 10*3/UL (ref 0–0.2)
CONV ANISOCYTES: ABNORMAL
CONV CO2: 27 MMOL/L (ref 22–32)
CONV COLLECTION SOURCE (UA): NORMAL
CONV EOSINOPHILS PERCENT BY MANUAL COUNT: 0.9 % (ref 0–7)
CONV IMMATURE GRAN: 2.2 % (ref 0–0.4)
CONV IMMATURE GRAN: 2.3 % (ref 0–1.8)
CONV SEGMENTED NEUTROPHILS: 40 % (ref 45–70)
CONV TOTAL PROTEIN: 6.2 G/DL (ref 6.3–8.2)
CONV UROBILINOGEN IN URINE BY AUTOMATED TEST STRIP: 1 {EHRLICHU}/DL (ref 0.1–1)
CREAT UR-MCNC: 0.92 MG/DL (ref 0.7–1.2)
DEPRECATED RDW RBC AUTO: 80.2 FL (ref 35.1–43.9)
EOSINOPHIL # BLD AUTO: 0.06 10*3/UL (ref 0–0.7)
EOSINOPHIL # BLD AUTO: 1.1 % (ref 0–7)
EOSINOPHIL # BLD MANUAL: 0.05 10*3/UL (ref 0–0.7)
EOSINOPHIL NFR BLD AUTO: 1 % (ref 0–7)
ERYTHROCYTE [DISTWIDTH] IN BLOOD BY AUTOMATED COUNT: 20.2 % (ref 11.5–14.5)
ERYTHROCYTE [DISTWIDTH] IN BLOOD BY AUTOMATED COUNT: 20.4 % (ref 11.6–14.4)
ERYTHROCYTE [DISTWIDTH] IN BLOOD BY AUTOMATED COUNT: 82.1 FL
GFR SERPLBLD BASED ON 1.73 SQ M-ARVRAT: >60 ML/MIN/{1.73_M2}
GLOBULIN UR ELPH-MCNC: 2.6 G/DL (ref 2–3.5)
GLUCOSE SERPL-MCNC: 102 MG/DL (ref 70–99)
GLUCOSE UR QL: NEGATIVE MG/DL
HBA1C MFR BLD: 9.5 G/DL (ref 14–18)
HCT VFR BLD AUTO: 31.4 % (ref 42–52)
HCT VFR BLD AUTO: 31.6 % (ref 42–52)
HGB BLD-MCNC: 9.4 G/DL (ref 14–18)
HGB UR QL STRIP: NEGATIVE
KETONES UR QL STRIP: NEGATIVE MG/DL
LARGE PLATELETS: SLIGHT
LEUKOCYTE ESTERASE UR QL STRIP: NEGATIVE
LYMPHOCYTES # BLD AUTO: 2.47 10*3/UL (ref 1–5)
LYMPHOCYTES # BLD AUTO: 2.52 10*3/UL (ref 1–5)
LYMPHOCYTES NFR BLD AUTO: 43.7 % (ref 20–45)
LYMPHOCYTES NFR BLD AUTO: 45.3 % (ref 20–45)
MCH RBC QN AUTO: 32.3 PG (ref 27–31)
MCH RBC QN AUTO: 33 PG (ref 27–31)
MCHC RBC AUTO-ENTMCNC: 29.7 G/DL (ref 33–37)
MCHC RBC AUTO-ENTMCNC: 30.3 G/DL (ref 33–37)
MCV RBC AUTO: 108.6 FL (ref 80–96)
MCV RBC AUTO: 109 FL (ref 80–96)
METAMYELOCYTES NFR BLD MANUAL: 2 %
MICROCYTES BLD QL: SLIGHT
MONOCYTES # BLD AUTO: 1.15 10*3/UL (ref 0.2–1.2)
MONOCYTES # BLD AUTO: 1.24 10*3/UL (ref 0.2–1.2)
MONOCYTES NFR BLD AUTO: 21.9 % (ref 3–10)
MONOCYTES NFR BLD MANUAL: 14 % (ref 3–10)
MONOCYTES NFR BLD MANUAL: 20.7 % (ref 3–10)
MYELOCYTES TOTAL PER COUNTED LEUKOCYTES BY MANUAL COUN: 1 %
NEUTROPHILS # BLD AUTO: 1.69 10*3/UL (ref 2–8)
NEUTROPHILS # BLD AUTO: 1.7 10*3/UL (ref 2–8)
NEUTROPHILS NFR BLD AUTO: 29.9 % (ref 30–85)
NEUTROPHILS NFR BLD MANUAL: 30.5 % (ref 30–85)
NITRITE UR QL STRIP: NEGATIVE
NRBC BLD AUTO-RTO: PRESENT %
NRBC CBCN: 1.8 % (ref 0–0.7)
NUC CELL # PRT MANUAL: 4 /100{WBCS}
OSMOLALITY SERPL CALC.SUM OF ELEC: 287 MOSM/KG (ref 273–304)
PATHOLOGY REVIEW: NORMAL
PH UR STRIP.AUTO: 7 [PH] (ref 5–8)
PLAT MORPH BLD: NORMAL
PLATELET # BLD AUTO: 270 10*3/UL (ref 130–400)
PLATELET # BLD AUTO: 282 10*3/UL (ref 130–400)
PMV BLD AUTO: 10.1 FL (ref 7.4–10.4)
PMV BLD AUTO: 11.3 FL (ref 9.4–12.4)
POIKILOCYTOSIS BLD QL SMEAR: SLIGHT
POLYCHROMASIA BLD QL SMEAR: ABNORMAL
POTASSIUM SERPL-SCNC: 3.9 MMOL/L (ref 3.5–5.3)
PROMYELOCYTES NFR BLD MANUAL: 1 %
PROT UR QL: NEGATIVE MG/DL
RBC # BLD AUTO: 2.91 10*6/UL (ref 4.7–6.1)
RBC MORPH BLD: 2.88 10*6/UL (ref 4.7–6.1)
SMALL PLATELETS BLD QL SMEAR: ADEQUATE
SODIUM SERPL-SCNC: 138 MMOL/L (ref 135–147)
SP GR UR: 1.01 (ref 1–1.03)
VARIANT LYMPHS NFR BLD MANUAL: 40 % (ref 20–45)
WBC # BLD AUTO: 5.56 10*3/UL (ref 4.8–10.8)
WBC # BLD AUTO: 5.65 10*3/UL (ref 4.8–10.8)

## 2020-06-17 ENCOUNTER — OFFICE VISIT CONVERTED (OUTPATIENT)
Dept: ONCOLOGY | Facility: HOSPITAL | Age: 70
End: 2020-06-17
Attending: INTERNAL MEDICINE

## 2020-06-17 LAB
ALBUMIN SERPL-MCNC: 3.5 G/DL (ref 3.5–5)
ALBUMIN/GLOB SERPL: 1.4 {RATIO} (ref 1.4–2.6)
ALP SERPL-CCNC: 88 U/L (ref 56–155)
ALT SERPL-CCNC: 15 U/L (ref 10–40)
ANION GAP SERPL CALC-SCNC: 12 MMOL/L (ref 8–19)
APPEARANCE UR: CLEAR
AST SERPL-CCNC: 20 U/L (ref 15–50)
BASOPHILS # BLD AUTO: 0.06 10*3/UL (ref 0–0.2)
BASOPHILS NFR BLD AUTO: 0.6 % (ref 0–3)
BILIRUB SERPL-MCNC: 0.6 MG/DL (ref 0.2–1.3)
BILIRUB UR QL: NEGATIVE
BUN SERPL-MCNC: 21 MG/DL (ref 5–25)
BUN/CREAT SERPL: 23 {RATIO} (ref 6–20)
CALCIUM SERPL-MCNC: 9.2 MG/DL (ref 8.7–10.4)
CALCIUM SPEC-SCNC: 8.8 MG/DL (ref 8.7–10.4)
CHLORIDE SERPL-SCNC: 102 MMOL/L (ref 99–111)
COLOR UR: YELLOW
CONV ABS IMM GRAN: 0.02 10*3/UL (ref 0–0.54)
CONV CO2: 27 MMOL/L (ref 22–32)
CONV COLLECTION SOURCE (UA): NORMAL
CONV EOSINOPHILS PERCENT BY MANUAL COUNT: 2.7 % (ref 0–7)
CONV IMMATURE GRAN: 0.2 % (ref 0–0.4)
CONV TOTAL PROTEIN: 6 G/DL (ref 6.3–8.2)
CONV UROBILINOGEN IN URINE BY AUTOMATED TEST STRIP: 1 {EHRLICHU}/DL (ref 0.1–1)
CREAT UR-MCNC: 0.91 MG/DL (ref 0.7–1.2)
EOSINOPHIL # BLD MANUAL: 0.25 10*3/UL (ref 0–0.7)
ERYTHROCYTE [DISTWIDTH] IN BLOOD BY AUTOMATED COUNT: 20.2 % (ref 11.5–14.5)
ERYTHROCYTE [DISTWIDTH] IN BLOOD BY AUTOMATED COUNT: 80.2 FL
GFR SERPLBLD BASED ON 1.73 SQ M-ARVRAT: >60 ML/MIN/{1.73_M2}
GLOBULIN UR ELPH-MCNC: 2.5 G/DL (ref 2–3.5)
GLUCOSE SERPL-MCNC: 169 MG/DL (ref 70–99)
GLUCOSE UR QL: NEGATIVE MG/DL
HBA1C MFR BLD: 9.7 G/DL (ref 14–18)
HCT VFR BLD AUTO: 32.3 % (ref 42–52)
HGB UR QL STRIP: NEGATIVE
KETONES UR QL STRIP: NEGATIVE MG/DL
LEUKOCYTE ESTERASE UR QL STRIP: NEGATIVE
LYMPHOCYTES # BLD AUTO: 1.78 10*3/UL (ref 1–5)
LYMPHOCYTES NFR BLD AUTO: 19.1 % (ref 20–45)
MCH RBC QN AUTO: 32.4 PG (ref 27–31)
MCHC RBC AUTO-ENTMCNC: 30 G/DL (ref 33–37)
MCV RBC AUTO: 108 FL (ref 80–96)
MONOCYTES # BLD AUTO: 0.73 10*3/UL (ref 0.2–1.2)
MONOCYTES NFR BLD MANUAL: 7.8 % (ref 3–10)
NEUTROPHILS # BLD AUTO: 6.47 10*3/UL (ref 2–8)
NEUTROPHILS NFR BLD MANUAL: 69.6 % (ref 30–85)
NITRITE UR QL STRIP: NEGATIVE
OSMOLALITY SERPL CALC.SUM OF ELEC: 293 MOSM/KG (ref 273–304)
PH UR STRIP.AUTO: 6.5 [PH] (ref 5–8)
PLATELET # BLD AUTO: 201 10*3/UL (ref 130–400)
PMV BLD AUTO: 11.1 FL (ref 7.4–10.4)
POTASSIUM SERPL-SCNC: 3.4 MMOL/L (ref 3.5–5.3)
PROT UR QL: NEGATIVE MG/DL
RBC MORPH BLD: 2.99 10*6/UL (ref 4.7–6.1)
SODIUM SERPL-SCNC: 138 MMOL/L (ref 135–147)
SP GR UR: 1.01 (ref 1–1.03)
T4 FREE SERPL-MCNC: 1.2 NG/DL (ref 0.9–1.8)
TSH SERPL-ACNC: 7.23 M[IU]/L (ref 0.27–4.2)
WBC # BLD AUTO: 9.31 10*3/UL (ref 4.8–10.8)

## 2020-07-01 LAB
ALBUMIN SERPL-MCNC: 3.4 G/DL (ref 3.5–5)
ALBUMIN/GLOB SERPL: 1 {RATIO} (ref 1.4–2.6)
ALP SERPL-CCNC: 108 U/L (ref 56–155)
ALT SERPL-CCNC: 15 U/L (ref 10–40)
ANION GAP SERPL CALC-SCNC: 12 MMOL/L (ref 8–19)
APPEARANCE UR: CLEAR
AST SERPL-CCNC: 17 U/L (ref 15–50)
BASOPHILS # BLD AUTO: 0.07 10*3/UL (ref 0–0.2)
BASOPHILS NFR BLD AUTO: 0.4 % (ref 0–3)
BILIRUB SERPL-MCNC: 0.61 MG/DL (ref 0.2–1.3)
BILIRUB UR QL: NEGATIVE
BUN SERPL-MCNC: 18 MG/DL (ref 5–25)
BUN/CREAT SERPL: 20 {RATIO} (ref 6–20)
CALCIUM SERPL-MCNC: 9.7 MG/DL (ref 8.7–10.4)
CALCIUM SPEC-SCNC: 9.2 MG/DL (ref 8.7–10.4)
CHLORIDE SERPL-SCNC: 100 MMOL/L (ref 99–111)
COLOR UR: YELLOW
CONV ABS IMM GRAN: 0.06 10*3/UL (ref 0–0.54)
CONV BACTERIA: NEGATIVE
CONV CO2: 28 MMOL/L (ref 22–32)
CONV COLLECTION SOURCE (UA): ABNORMAL
CONV EOSINOPHILS PERCENT BY MANUAL COUNT: 3.6 % (ref 0–7)
CONV HYALINE CASTS IN URINE MICRO: ABNORMAL /[LPF]
CONV IMMATURE GRAN: 0.3 % (ref 0–0.4)
CONV TOTAL PROTEIN: 6.7 G/DL (ref 6.3–8.2)
CONV UROBILINOGEN IN URINE BY AUTOMATED TEST STRIP: 1 {EHRLICHU}/DL (ref 0.1–1)
CREAT UR-MCNC: 0.91 MG/DL (ref 0.7–1.2)
EOSINOPHIL # BLD MANUAL: 0.69 10*3/UL (ref 0–0.7)
ERYTHROCYTE [DISTWIDTH] IN BLOOD BY AUTOMATED COUNT: 19.3 % (ref 11.5–14.5)
ERYTHROCYTE [DISTWIDTH] IN BLOOD BY AUTOMATED COUNT: 75.1 FL
GFR SERPLBLD BASED ON 1.73 SQ M-ARVRAT: >60 ML/MIN/{1.73_M2}
GLOBULIN UR ELPH-MCNC: 3.3 G/DL (ref 2–3.5)
GLUCOSE SERPL-MCNC: 127 MG/DL (ref 70–99)
GLUCOSE UR QL: NEGATIVE MG/DL
HBA1C MFR BLD: 10.9 G/DL (ref 14–18)
HCT VFR BLD AUTO: 36.3 % (ref 42–52)
HGB UR QL STRIP: NEGATIVE
KETONES UR QL STRIP: NEGATIVE MG/DL
LEUKOCYTE ESTERASE UR QL STRIP: ABNORMAL
LYMPHOCYTES # BLD AUTO: 2.18 10*3/UL (ref 1–5)
LYMPHOCYTES NFR BLD AUTO: 11.2 % (ref 20–45)
MCH RBC QN AUTO: 31.4 PG (ref 27–31)
MCHC RBC AUTO-ENTMCNC: 30 G/DL (ref 33–37)
MCV RBC AUTO: 104.6 FL (ref 80–96)
MONOCYTES # BLD AUTO: 0.71 10*3/UL (ref 0.2–1.2)
MONOCYTES NFR BLD MANUAL: 3.7 % (ref 3–10)
MUCOUS THREADS URNS QL MICRO: ABNORMAL
NEUTROPHILS # BLD AUTO: 15.72 10*3/UL (ref 2–8)
NEUTROPHILS NFR BLD MANUAL: 80.8 % (ref 30–85)
NITRITE UR QL STRIP: NEGATIVE
OSMOLALITY SERPL CALC.SUM OF ELEC: 287 MOSM/KG (ref 273–304)
PH UR STRIP.AUTO: 6 [PH] (ref 5–8)
PLATELET # BLD AUTO: 265 10*3/UL (ref 130–400)
PMV BLD AUTO: 10.7 FL (ref 7.4–10.4)
POTASSIUM SERPL-SCNC: 3.4 MMOL/L (ref 3.5–5.3)
PROT UR QL: NEGATIVE MG/DL
RBC #/AREA URNS HPF: ABNORMAL /[HPF]
RBC MORPH BLD: 3.47 10*6/UL (ref 4.7–6.1)
SODIUM SERPL-SCNC: 137 MMOL/L (ref 135–147)
SP GR UR: 1.02 (ref 1–1.03)
WBC # BLD AUTO: 19.43 10*3/UL (ref 4.8–10.8)
WBC #/AREA URNS HPF: ABNORMAL /[HPF]

## 2020-07-15 LAB
ALBUMIN SERPL-MCNC: 3.3 G/DL (ref 3.5–5)
ALBUMIN/GLOB SERPL: 0.9 {RATIO} (ref 1.4–2.6)
ALP SERPL-CCNC: 105 U/L (ref 56–155)
ALT SERPL-CCNC: 14 U/L (ref 10–40)
ANION GAP SERPL CALC-SCNC: 13 MMOL/L (ref 8–19)
APPEARANCE UR: CLEAR
AST SERPL-CCNC: 22 U/L (ref 15–50)
BASOPHILS # BLD AUTO: 0.06 10*3/UL (ref 0–0.2)
BASOPHILS NFR BLD AUTO: 0.4 % (ref 0–3)
BILIRUB SERPL-MCNC: 0.38 MG/DL (ref 0.2–1.3)
BILIRUB UR QL: NEGATIVE
BUN SERPL-MCNC: 14 MG/DL (ref 5–25)
BUN/CREAT SERPL: 17 {RATIO} (ref 6–20)
CALCIUM SERPL-MCNC: 9.6 MG/DL (ref 8.7–10.4)
CHLORIDE SERPL-SCNC: 103 MMOL/L (ref 99–111)
COLOR UR: YELLOW
CONV ABS IMM GRAN: 0.06 10*3/UL (ref 0–0.54)
CONV CO2: 27 MMOL/L (ref 22–32)
CONV COLLECTION SOURCE (UA): NORMAL
CONV EOSINOPHILS PERCENT BY MANUAL COUNT: 3 % (ref 0–7)
CONV IMMATURE GRAN: 0.4 % (ref 0–0.4)
CONV TOTAL PROTEIN: 6.8 G/DL (ref 6.3–8.2)
CONV UROBILINOGEN IN URINE BY AUTOMATED TEST STRIP: 0.2 {EHRLICHU}/DL (ref 0.1–1)
CREAT UR-MCNC: 0.83 MG/DL (ref 0.7–1.2)
EOSINOPHIL # BLD MANUAL: 0.51 10*3/UL (ref 0–0.7)
ERYTHROCYTE [DISTWIDTH] IN BLOOD BY AUTOMATED COUNT: 18.7 % (ref 11.5–14.5)
ERYTHROCYTE [DISTWIDTH] IN BLOOD BY AUTOMATED COUNT: 70.5 FL
GFR SERPLBLD BASED ON 1.73 SQ M-ARVRAT: >60 ML/MIN/{1.73_M2}
GLOBULIN UR ELPH-MCNC: 3.5 G/DL (ref 2–3.5)
GLUCOSE SERPL-MCNC: 109 MG/DL (ref 70–99)
GLUCOSE UR QL: NEGATIVE MG/DL
HBA1C MFR BLD: 11.4 G/DL (ref 14–18)
HCT VFR BLD AUTO: 38.6 % (ref 42–52)
HGB UR QL STRIP: NEGATIVE
KETONES UR QL STRIP: NEGATIVE MG/DL
LEUKOCYTE ESTERASE UR QL STRIP: NEGATIVE
LYMPHOCYTES # BLD AUTO: 1.71 10*3/UL (ref 1–5)
LYMPHOCYTES NFR BLD AUTO: 10.2 % (ref 20–45)
MCH RBC QN AUTO: 30.1 PG (ref 27–31)
MCHC RBC AUTO-ENTMCNC: 29.5 G/DL (ref 33–37)
MCV RBC AUTO: 101.8 FL (ref 80–96)
MONOCYTES # BLD AUTO: 0.88 10*3/UL (ref 0.2–1.2)
MONOCYTES NFR BLD MANUAL: 5.3 % (ref 3–10)
NEUTROPHILS # BLD AUTO: 13.51 10*3/UL (ref 2–8)
NEUTROPHILS NFR BLD MANUAL: 80.7 % (ref 30–85)
NITRITE UR QL STRIP: NEGATIVE
OSMOLALITY SERPL CALC.SUM OF ELEC: 289 MOSM/KG (ref 273–304)
PH UR STRIP.AUTO: 6.5 [PH] (ref 5–8)
PLATELET # BLD AUTO: 296 10*3/UL (ref 130–400)
PMV BLD AUTO: 10.3 FL (ref 7.4–10.4)
POTASSIUM SERPL-SCNC: 3.7 MMOL/L (ref 3.5–5.3)
PROT UR QL: NEGATIVE MG/DL
RBC MORPH BLD: 3.79 10*6/UL (ref 4.7–6.1)
SODIUM SERPL-SCNC: 139 MMOL/L (ref 135–147)
SP GR UR: 1.01 (ref 1–1.03)
T4 FREE SERPL-MCNC: 1.1 NG/DL (ref 0.9–1.8)
TSH SERPL-ACNC: 6.69 M[IU]/L (ref 0.27–4.2)
WBC # BLD AUTO: 16.73 10*3/UL (ref 4.8–10.8)

## 2020-09-16 NOTE — PROGRESS NOTES
Date of Office Visit: 2020  Encounter Provider: YADI Vaz  Place of Service: [facility] CARDIOLOGY  Patient Name: Gabriele Connolly  :1950    Chief Complaint   Patient presents with   • Coronary Artery Disease     1 YR FOLLOW UP   • Hypertension   :     HPI: Gabriele Connolly is a 69 y.o. male, known to me, who presents today for follow-up.  Old records have been obtained and reviewed by me.  He is a patient of Dr. Perera with a past cardiac history significant for hypertension and coronary artery disease.  In 2017, he had an inferior STEMI underwent thrombectomy and drug-eluting stent placement to the RCA.  At that time, he had normal LV function.   I last saw him in the office on 2019 time he was doing well with no complaints of angina or heart failure.  He had been diagnosed with esophageal cancer in 2018.  He was undergoing chemotherapy as well as immunotherapy.  Additionally, he was following with [facility] doctor who had him on several different herbal therapies for a number of ailments.  He was reporting lower extremity edema, and I stopped his amlodipine and instead started him on chlorthalidone.  He was advised to follow-up in 6 months.   From a cardiac standpoint, he has been doing well.  He denies any chest pain, shortness of breath, palpitations, dizziness, or syncope.  He does have bilateral lower extremity edema that is overall unchanged.  He does report an improvement since stopping the amlodipine and starting the chlorthalidone last year.  He is still undergoing chemotherapy for his cancer.  He just started a fourth treatment with a new chemotherapy drug.  Evidently he has not had any side effects with any of the medications until this 1.  This 1 did cause him to feel nausea and have some diarrhea.      Past Medical History:   Diagnosis Date   • Arthritis    • ASCVD (arteriosclerotic cardiovascular disease)    • Cancer (CMS/HCC)     • Cerebral infarct (CMS/[facility])    • Coronary artery disease    • Gout    • Hypertension    • Pneumonia    • ST elevation myocardial infarction (STEMI) (CMS/[facility]) 10/25/2017   • Stroke (CMS/[facility])    • TIA (transient ischemic attack)        Past Surgical History:   Procedure Laterality Date   • ANKLE SURGERY Left    • CARDIAC CATHETERIZATION N/A 10/25/2017    Procedure: Left Heart Cath;  Surgeon: Contreras Guillory MD;  Location: [facility] CATH INVASIVE LOCATION;  Service:    • CARDIAC CATHETERIZATION N/A 10/25/2017    Procedure: Stent LUISITO coronary;  Surgeon: Contreras Guillory MD;  Location: [facility] CATH INVASIVE LOCATION;  Service:    • CARDIAC CATHETERIZATION N/A 10/25/2017    Procedure: Left ventriculography;  Surgeon: Contreras Guillory MD;  Location: [facility] CATH INVASIVE LOCATION;  Service:    • CARDIAC CATHETERIZATION N/A 10/25/2017    Procedure: Coronary angiography;  Surgeon: Contreras Guillory MD;  Location: [facility] CATH INVASIVE LOCATION;  Service:    • CARDIAC CATHETERIZATION  10/25/2017    Procedure: Percutaneous Manual Thrombectomy;  Surgeon: Contreras Guillory MD;  Location: [facility] CATH INVASIVE LOCATION;  Service:    • CORONARY ANGIOPLASTY     • CORONARY STENT PLACEMENT     • JOINT REPLACEMENT      knee   • MOLE REMOVAL     • REPLACEMENT TOTAL KNEE BILATERAL         Social History     Socioeconomic History   • Marital status:      Spouse name: Not on file   • Number of children: Not on file   • Years of education: Not on file   • Highest education level: Not on file   Tobacco Use   • Smoking status: Former Smoker     Types: Cigarettes     Quit date:      Years since quittin.7   • Smokeless tobacco: Never Used   • Tobacco comment: CAFFEINE USE-1 CUPS DECAF COFFEE DAILY    Substance and Sexual Activity   • Alcohol use: No   • Drug use: No   • Sexual activity: Defer       Family History   Problem Relation Age of Onset   • Hypertension Mother    • Cancer Mother    • Hypertension  Father    • Arthritis Father    • No Known Problems Maternal Grandmother    • No Known Problems Maternal Grandfather    • No Known Problems Paternal Grandmother    • No Known Problems Paternal Grandfather        Review of Systems   Constitution: Negative for chills, fever and malaise/fatigue.   Cardiovascular: Positive for leg swelling (BLE). Negative for chest pain, dyspnea on exertion, near-syncope, orthopnea, palpitations, paroxysmal nocturnal dyspnea and syncope.   Respiratory: Negative for cough and shortness of breath.    Musculoskeletal: Negative for joint pain, joint swelling and myalgias.   Gastrointestinal: Positive for diarrhea and nausea. Negative for abdominal pain, melena and vomiting.   Genitourinary: Negative for frequency and hematuria.   Neurological: Negative for light-headedness, numbness, paresthesias and seizures.   Allergic/Immunologic: Negative.    All other systems reviewed and are negative.      Allergies   Allergen Reactions   • Atorvastatin Myalgia         Current Outpatient Medications:   •  allopurinol (ZYLOPRIM) 300 MG tablet, Take 300 mg by mouth Daily., Disp: , Rfl: 11  •  aspirin 81 MG EC tablet, Take 81 mg by mouth Daily., Disp: , Rfl:   •  chlorthalidone (HYGROTON) 25 MG tablet, Take 0.5 tablets by mouth Daily for 90 days., Disp: 45 tablet, Rfl: 0  •  Coenzyme Q10 (COQ10) 100 MG capsule, Take 100 mg by mouth Daily., Disp: , Rfl:   •  DULoxetine (CYMBALTA) 20 MG capsule, Take 20 mg by mouth Daily., Disp: , Rfl:   •  FEROSUL 325 (65 Fe) MG tablet, Take 1 tablet by mouth 2 (Two) Times a Day., Disp: , Rfl: 11  •  folic acid (FOLVITE) 1 MG tablet, Take 1 mg by mouth Daily., Disp: , Rfl:   •  furosemide (LASIX) 40 MG tablet, Take 1 tablet by mouth Daily., Disp: 90 tablet, Rfl: 1  •  glucosamine-chondroitin 500-400 MG capsule capsule, Take 1 capsule by mouth 2 (Two) Times a Day With Meals., Disp: , Rfl:   •  HYDROcodone-acetaminophen (NORCO) 5-325 MG per tablet, TK 1 T PO  Q 6 H PRN P,  "Disp: , Rfl: 0  •  irbesartan (AVAPRO) 300 MG tablet, Take 300 mg by mouth Daily., Disp: , Rfl:   •  levothyroxine (SYNTHROID, LEVOTHROID) 50 MCG tablet, Take 50 mcg by mouth Daily., Disp: , Rfl:   •  methotrexate 2.5 MG tablet, Take 10 mg by mouth 1 (One) Time Per Week. Patient takes four 2.5 mg tablets once a week on mondays per patient and patient's wife. Written list of medications states \"2.5 mg four per week\", Disp: , Rfl:   •  metoprolol tartrate (LOPRESSOR) 25 MG tablet, TAKE 1 TABLET BY MOUTH EVERY 12 HOURS, Disp: 60 tablet, Rfl: 0  •  nitroglycerin (NITROSTAT) 0.4 MG SL tablet, Place 1 tablet under the tongue Every 5 (Five) Minutes As Needed for Chest Pain. Take no more than 3 doses in 15 minutes., Disp: 30 tablet, Rfl: 5  •  pantoprazole (PROTONIX) 40 MG EC tablet, Take 40 mg by mouth 2 (Two) Times a Day., Disp: , Rfl: 11  •  pravastatin (PRAVACHOL) 80 MG tablet, TAKE 1 TABLET BY MOUTH DAILY, Disp: 90 tablet, Rfl: 0  •  predniSONE (DELTASONE) 1 MG tablet, Take 1 mg by mouth 3 (Three) Times a Day., Disp: , Rfl:   •  prochlorperazine (COMPAZINE) 10 MG tablet, Take 10 mg by mouth Daily., Disp: , Rfl:   •  Pyridoxine HCl (Vitamin B6) 100 MG tablet, Take 100 mg by mouth 2 (two) times a day., Disp: , Rfl:       Objective:     Vitals:    09/17/20 0953 09/17/20 1006   BP: 134/80 134/80   BP Location: Right arm Left arm   Patient Position: Sitting Sitting   Pulse: 70    Resp: 18    SpO2: 95%    Weight: 115 kg (253 lb 6.4 oz)    Height: 180.3 cm (71\")      Body mass index is 35.34 kg/m².    PHYSICAL EXAM:    Constitutional:       General: Not in acute distress.     Appearance: Well-developed. Not diaphoretic.   Eyes:      Pupils: Pupils are equal, round, and reactive to light.   HENT:      Head: Normocephalic and atraumatic.   Neck:      Thyroid: No thyromegaly.      Vascular: No JVD.   Pulmonary:      Effort: Pulmonary effort is normal. No respiratory distress.      Breath sounds: Normal breath sounds. "   Cardiovascular:      Normal rate. Regular rhythm.   Pulses:     Intact distal pulses.   Edema:     Peripheral edema (BLE mild) present.  Abdominal:      General: Bowel sounds are normal. There is no distension.      Palpations: Abdomen is soft. There is no hepatomegaly or splenomegaly.      Tenderness: There is no abdominal tenderness.   Musculoskeletal: Normal range of motion.   Skin:     General: Skin is warm and dry.      Findings: Bruising present. No erythema.   Neurological:      Mental Status: Alert and oriented to person, place, and time.   Psychiatric:         Behavior: Behavior normal.         Judgment: Judgment normal.           ECG 12 Lead    Date/Time: 9/17/2020 10:10 AM  Performed by: Reyna Gamez APRN  Authorized by: Reyna Gamez APRN   Comparison: compared with previous ECG from 9/6/2019  Similar to previous ECG  Rhythm: sinus rhythm  Rate: normal  BPM: 67  Q waves: II, III and aVF    T inversion: III    Clinical impression: abnormal EKG  Comments: Indication: CAD              Assessment:       Diagnosis Plan   1. Coronary artery disease involving native coronary artery of native heart without angina pectoris  ECG 12 Lead   2. Essential hypertension       Orders Placed This Encounter   Procedures   • ECG 12 Lead     This order was created via procedure documentation          Plan:       1.  Coronary artery disease.  He denies any symptoms of angina.  His EKG is stable and unchanged.  He is on good medical therapy with an aspirin and a statin.        2.  Hypertension.  His blood pressure is well controlled.        I think he is stable and doing well.  He denies any symptoms of angina or heart failure.  I am not going to make any changes, and he will follow-up with Dr. Guillory in 1 year or sooner if needed.      As always, it has been a pleasure to participate in your patient's care.      Sincerely,         YADI Richardson

## 2021-01-01 ENCOUNTER — HOSPITAL ENCOUNTER (OUTPATIENT)
Dept: RADIATION ONCOLOGY | Facility: HOSPITAL | Age: 71
Discharge: HOME | End: 2021-06-23

## 2021-01-01 ENCOUNTER — DOCUMENTATION (OUTPATIENT)
Dept: NUTRITION | Facility: HOSPITAL | Age: 71
End: 2021-01-01

## 2021-01-01 ENCOUNTER — HOSPITAL ENCOUNTER (OUTPATIENT)
Facility: HOSPITAL | Age: 71
Setting detail: HOSPITAL OUTPATIENT SURGERY
Discharge: HOME | End: 2021-06-17
Attending: INTERNAL MEDICINE | Admitting: INTERNAL MEDICINE

## 2021-01-01 ENCOUNTER — HOSPITAL ENCOUNTER (OUTPATIENT)
Dept: CARDIOLOGY | Facility: HOSPITAL | Age: 71
Discharge: HOME | End: 2021-06-24
Admitting: RADIOLOGY

## 2021-01-01 ENCOUNTER — ANESTHESIA EVENT (OUTPATIENT)
Dept: GASTROENTEROLOGY | Facility: HOSPITAL | Age: 71
End: 2021-01-01

## 2021-01-01 ENCOUNTER — OFFICE VISIT CONVERTED (OUTPATIENT)
Dept: ONCOLOGY | Facility: HOSPITAL | Age: 71
End: 2021-01-01
Attending: INTERNAL MEDICINE

## 2021-01-01 ENCOUNTER — HOSPITAL ENCOUNTER (OUTPATIENT)
Dept: RADIATION ONCOLOGY | Facility: HOSPITAL | Age: 71
Setting detail: RADIATION/ONCOLOGY SERIES
End: 2021-01-01

## 2021-01-01 ENCOUNTER — OFFICE VISIT (OUTPATIENT)
Dept: ONCOLOGY | Facility: HOSPITAL | Age: 71
End: 2021-01-01

## 2021-01-01 ENCOUNTER — TRANSCRIBE ORDERS (OUTPATIENT)
Dept: PHYSICAL THERAPY | Facility: CLINIC | Age: 71
End: 2021-01-01

## 2021-01-01 ENCOUNTER — HOSPITAL ENCOUNTER (OUTPATIENT)
Dept: PET IMAGING | Facility: HOSPITAL | Age: 71
Discharge: HOME | End: 2021-02-03
Attending: INTERNAL MEDICINE

## 2021-01-01 ENCOUNTER — HOSPITAL ENCOUNTER (OUTPATIENT)
Dept: RADIATION ONCOLOGY | Facility: HOSPITAL | Age: 71
Discharge: HOME | End: 2021-06-29

## 2021-01-01 ENCOUNTER — HOSPITAL ENCOUNTER (OUTPATIENT)
Dept: RADIATION ONCOLOGY | Facility: HOSPITAL | Age: 71
Discharge: HOME | End: 2021-07-06

## 2021-01-01 ENCOUNTER — APPOINTMENT (OUTPATIENT)
Dept: ONCOLOGY | Facility: HOSPITAL | Age: 71
End: 2021-01-01

## 2021-01-01 ENCOUNTER — HOSPITAL ENCOUNTER (OUTPATIENT)
Dept: PREADMISSION TESTING | Facility: HOSPITAL | Age: 71
Discharge: HOME | End: 2021-01-11
Attending: INTERNAL MEDICINE

## 2021-01-01 ENCOUNTER — HOSPITAL ENCOUNTER (OUTPATIENT)
Dept: RADIATION ONCOLOGY | Facility: HOSPITAL | Age: 71
Discharge: HOME | End: 2021-06-22

## 2021-01-01 ENCOUNTER — HOSPITAL ENCOUNTER (OUTPATIENT)
Dept: INFUSION THERAPY | Facility: HOSPITAL | Age: 71
Setting detail: INFUSION SERIES
Discharge: HOME | End: 2021-06-21

## 2021-01-01 ENCOUNTER — HOSPITAL ENCOUNTER (OUTPATIENT)
Dept: GENERAL RADIOLOGY | Facility: HOSPITAL | Age: 71
Discharge: HOME | End: 2021-06-15

## 2021-01-01 ENCOUNTER — HOSPITAL ENCOUNTER (OUTPATIENT)
Dept: GASTROENTEROLOGY | Facility: HOSPITAL | Age: 71
Setting detail: HOSPITAL OUTPATIENT SURGERY
Discharge: HOME | End: 2021-01-15
Attending: INTERNAL MEDICINE

## 2021-01-01 ENCOUNTER — HOSPITAL ENCOUNTER (OUTPATIENT)
Dept: OTHER | Facility: HOSPITAL | Age: 71
Discharge: HOME | End: 2021-02-03
Attending: INTERNAL MEDICINE

## 2021-01-01 ENCOUNTER — TELEPHONE (OUTPATIENT)
Dept: RADIATION ONCOLOGY | Facility: HOSPITAL | Age: 71
End: 2021-01-01

## 2021-01-01 ENCOUNTER — HOSPITAL ENCOUNTER (OUTPATIENT)
Dept: OTHER | Facility: HOSPITAL | Age: 71
Discharge: HOME | End: 2021-06-01
Attending: INTERNAL MEDICINE

## 2021-01-01 ENCOUNTER — HOSPITAL ENCOUNTER (OUTPATIENT)
Dept: OTHER | Facility: HOSPITAL | Age: 71
Discharge: HOME | End: 2021-05-11
Attending: INTERNAL MEDICINE

## 2021-01-01 ENCOUNTER — OFFICE VISIT CONVERTED (OUTPATIENT)
Dept: GASTROENTEROLOGY | Facility: CLINIC | Age: 71
End: 2021-01-01
Attending: NURSE PRACTITIONER

## 2021-01-01 ENCOUNTER — HOSPITAL ENCOUNTER (OUTPATIENT)
Dept: RADIATION ONCOLOGY | Facility: HOSPITAL | Age: 71
Discharge: HOME | End: 2021-06-24

## 2021-01-01 ENCOUNTER — HOSPITAL ENCOUNTER (OUTPATIENT)
Dept: ONCOLOGY | Facility: HOSPITAL | Age: 71
Setting detail: INFUSION SERIES
Discharge: HOME | End: 2021-07-15

## 2021-01-01 ENCOUNTER — HOSPITAL ENCOUNTER (OUTPATIENT)
Dept: RADIATION ONCOLOGY | Facility: HOSPITAL | Age: 71
Discharge: HOME | End: 2021-06-30

## 2021-01-01 ENCOUNTER — HOSPITAL ENCOUNTER (OUTPATIENT)
Dept: CT IMAGING | Facility: HOSPITAL | Age: 71
Discharge: HOME | End: 2021-05-28
Attending: INTERNAL MEDICINE

## 2021-01-01 ENCOUNTER — DOCUMENTATION (OUTPATIENT)
Dept: ONCOLOGY | Facility: HOSPITAL | Age: 71
End: 2021-01-01

## 2021-01-01 ENCOUNTER — HOSPITAL ENCOUNTER (OUTPATIENT)
Dept: RADIATION ONCOLOGY | Facility: HOSPITAL | Age: 71
Setting detail: RADIATION/ONCOLOGY SERIES
Discharge: HOME | End: 2021-07-02

## 2021-01-01 ENCOUNTER — HOSPITAL ENCOUNTER (OUTPATIENT)
Dept: GASTROENTEROLOGY | Facility: HOSPITAL | Age: 71
Setting detail: HOSPITAL OUTPATIENT SURGERY
Discharge: HOME | End: 2021-04-14
Attending: INTERNAL MEDICINE

## 2021-01-01 ENCOUNTER — HOSPITAL ENCOUNTER (OUTPATIENT)
Dept: RADIATION ONCOLOGY | Facility: HOSPITAL | Age: 71
Discharge: HOME | End: 2021-07-01

## 2021-01-01 ENCOUNTER — HOSPITAL ENCOUNTER (OUTPATIENT)
Dept: RADIATION ONCOLOGY | Facility: HOSPITAL | Age: 71
Setting detail: RADIATION/ONCOLOGY SERIES
Discharge: HOME | End: 2021-06-21

## 2021-01-01 ENCOUNTER — TRANSCRIBE ORDERS (OUTPATIENT)
Dept: ADMINISTRATIVE | Facility: HOSPITAL | Age: 71
End: 2021-01-01

## 2021-01-01 ENCOUNTER — CONSULT (OUTPATIENT)
Dept: RADIATION ONCOLOGY | Facility: HOSPITAL | Age: 71
End: 2021-01-01

## 2021-01-01 ENCOUNTER — HOSPITAL ENCOUNTER (OUTPATIENT)
Dept: OTHER | Facility: HOSPITAL | Age: 71
Discharge: HOME | End: 2021-01-07
Attending: INTERNAL MEDICINE

## 2021-01-01 ENCOUNTER — TRANSCRIBE ORDERS (OUTPATIENT)
Dept: GENERAL RADIOLOGY | Facility: HOSPITAL | Age: 71
End: 2021-01-01

## 2021-01-01 ENCOUNTER — ANESTHESIA (OUTPATIENT)
Dept: GASTROENTEROLOGY | Facility: HOSPITAL | Age: 71
End: 2021-01-01

## 2021-01-01 ENCOUNTER — HOSPITAL ENCOUNTER (OUTPATIENT)
Dept: RADIATION ONCOLOGY | Facility: HOSPITAL | Age: 71
Discharge: HOME | End: 2021-06-25

## 2021-01-01 ENCOUNTER — HOSPITAL ENCOUNTER (OUTPATIENT)
Dept: ONCOLOGY | Facility: HOSPITAL | Age: 71
Setting detail: INFUSION SERIES
Discharge: HOME | End: 2021-06-09

## 2021-01-01 ENCOUNTER — HOSPITAL ENCOUNTER (OUTPATIENT)
Dept: RADIATION ONCOLOGY | Facility: HOSPITAL | Age: 71
Discharge: HOME | End: 2021-06-28

## 2021-01-01 ENCOUNTER — PREP FOR SURGERY (OUTPATIENT)
Dept: OTHER | Facility: HOSPITAL | Age: 71
End: 2021-01-01

## 2021-01-01 ENCOUNTER — PROCEDURE VISIT CONVERTED (OUTPATIENT)
Dept: OTHER | Facility: HOSPITAL | Age: 71
End: 2021-01-01
Attending: INTERNAL MEDICINE

## 2021-01-01 ENCOUNTER — HOSPITAL ENCOUNTER (OUTPATIENT)
Dept: CT IMAGING | Facility: HOSPITAL | Age: 71
Discharge: HOME | End: 2021-02-04
Attending: INTERNAL MEDICINE

## 2021-01-01 ENCOUNTER — HOSPITAL ENCOUNTER (OUTPATIENT)
Dept: ONCOLOGY | Facility: HOSPITAL | Age: 71
End: 2021-01-01

## 2021-01-01 VITALS
SYSTOLIC BLOOD PRESSURE: 130 MMHG | TEMPERATURE: 97 F | RESPIRATION RATE: 20 BRPM | BODY MASS INDEX: 28.31 KG/M2 | HEART RATE: 71 BPM | OXYGEN SATURATION: 95 % | WEIGHT: 197.31 LBS | DIASTOLIC BLOOD PRESSURE: 79 MMHG

## 2021-01-01 VITALS
RESPIRATION RATE: 18 BRPM | BODY MASS INDEX: 38.21 KG/M2 | TEMPERATURE: 98.8 F | SYSTOLIC BLOOD PRESSURE: 145 MMHG | HEIGHT: 70 IN | OXYGEN SATURATION: 97 % | TEMPERATURE: 98.3 F | BODY MASS INDEX: 38.99 KG/M2 | OXYGEN SATURATION: 98 % | BODY MASS INDEX: 37.27 KG/M2 | HEART RATE: 88 BPM | HEIGHT: 69 IN | OXYGEN SATURATION: 96 % | DIASTOLIC BLOOD PRESSURE: 77 MMHG | RESPIRATION RATE: 20 BRPM | DIASTOLIC BLOOD PRESSURE: 84 MMHG | HEIGHT: 69 IN | TEMPERATURE: 98.3 F | WEIGHT: 263.23 LBS | BODY MASS INDEX: 37.84 KG/M2 | WEIGHT: 261.02 LBS | SYSTOLIC BLOOD PRESSURE: 127 MMHG | HEART RATE: 69 BPM | OXYGEN SATURATION: 97 % | DIASTOLIC BLOOD PRESSURE: 80 MMHG | BODY MASS INDEX: 38.66 KG/M2 | WEIGHT: 263.23 LBS | OXYGEN SATURATION: 100 % | TEMPERATURE: 98.9 F | DIASTOLIC BLOOD PRESSURE: 90 MMHG | HEIGHT: 69 IN | BODY MASS INDEX: 38.2 KG/M2 | DIASTOLIC BLOOD PRESSURE: 77 MMHG | RESPIRATION RATE: 20 BRPM | RESPIRATION RATE: 20 BRPM | RESPIRATION RATE: 18 BRPM | RESPIRATION RATE: 20 BRPM | TEMPERATURE: 98.5 F | RESPIRATION RATE: 20 BRPM | HEART RATE: 64 BPM | BODY MASS INDEX: 38 KG/M2 | HEART RATE: 74 BPM | WEIGHT: 261.47 LBS | HEIGHT: 69 IN | BODY MASS INDEX: 36.71 KG/M2 | TEMPERATURE: 98.4 F | TEMPERATURE: 98.2 F | DIASTOLIC BLOOD PRESSURE: 81 MMHG | OXYGEN SATURATION: 98 % | TEMPERATURE: 98.8 F | HEART RATE: 71 BPM | OXYGEN SATURATION: 96 % | HEART RATE: 63 BPM | HEIGHT: 69 IN | WEIGHT: 265.43 LBS | HEART RATE: 69 BPM | BODY MASS INDEX: 38.43 KG/M2 | WEIGHT: 258.82 LBS | SYSTOLIC BLOOD PRESSURE: 165 MMHG | BODY MASS INDEX: 36.47 KG/M2 | BODY MASS INDEX: 37.45 KG/M2 | DIASTOLIC BLOOD PRESSURE: 70 MMHG | OXYGEN SATURATION: 94 % | OXYGEN SATURATION: 95 % | SYSTOLIC BLOOD PRESSURE: 158 MMHG | DIASTOLIC BLOOD PRESSURE: 78 MMHG | DIASTOLIC BLOOD PRESSURE: 83 MMHG | HEART RATE: 74 BPM | TEMPERATURE: 98.8 F | OXYGEN SATURATION: 96 % | DIASTOLIC BLOOD PRESSURE: 92 MMHG | HEART RATE: 74 BPM | DIASTOLIC BLOOD PRESSURE: 94 MMHG | OXYGEN SATURATION: 98 % | HEIGHT: 69 IN | WEIGHT: 258.38 LBS | RESPIRATION RATE: 20 BRPM | WEIGHT: 257.94 LBS | WEIGHT: 251.54 LBS | RESPIRATION RATE: 16 BRPM | RESPIRATION RATE: 20 BRPM | TEMPERATURE: 98 F | TEMPERATURE: 98 F | DIASTOLIC BLOOD PRESSURE: 81 MMHG | WEIGHT: 260.36 LBS | HEIGHT: 69 IN | WEIGHT: 252.87 LBS | SYSTOLIC BLOOD PRESSURE: 148 MMHG | SYSTOLIC BLOOD PRESSURE: 143 MMHG | SYSTOLIC BLOOD PRESSURE: 160 MMHG | TEMPERATURE: 97.4 F | SYSTOLIC BLOOD PRESSURE: 144 MMHG | HEART RATE: 69 BPM | BODY MASS INDEX: 36.37 KG/M2 | DIASTOLIC BLOOD PRESSURE: 79 MMHG | HEART RATE: 73 BPM | RESPIRATION RATE: 16 BRPM | SYSTOLIC BLOOD PRESSURE: 128 MMHG | RESPIRATION RATE: 18 BRPM | HEART RATE: 66 BPM | SYSTOLIC BLOOD PRESSURE: 140 MMHG | RESPIRATION RATE: 20 BRPM | BODY MASS INDEX: 34.84 KG/M2 | TEMPERATURE: 99.5 F | WEIGHT: 259.04 LBS | RESPIRATION RATE: 18 BRPM | SYSTOLIC BLOOD PRESSURE: 170 MMHG | HEIGHT: 69 IN | OXYGEN SATURATION: 93 % | WEIGHT: 268.08 LBS | DIASTOLIC BLOOD PRESSURE: 69 MMHG | RESPIRATION RATE: 18 BRPM | SYSTOLIC BLOOD PRESSURE: 142 MMHG | HEART RATE: 60 BPM | SYSTOLIC BLOOD PRESSURE: 137 MMHG | HEART RATE: 69 BPM | WEIGHT: 265.43 LBS | HEART RATE: 76 BPM | SYSTOLIC BLOOD PRESSURE: 125 MMHG | OXYGEN SATURATION: 96 % | SYSTOLIC BLOOD PRESSURE: 143 MMHG | HEIGHT: 70 IN | DIASTOLIC BLOOD PRESSURE: 90 MMHG | SYSTOLIC BLOOD PRESSURE: 136 MMHG | RESPIRATION RATE: 18 BRPM | DIASTOLIC BLOOD PRESSURE: 73 MMHG | HEIGHT: 69 IN | BODY MASS INDEX: 34.1 KG/M2 | HEIGHT: 70 IN | TEMPERATURE: 97.8 F | DIASTOLIC BLOOD PRESSURE: 87 MMHG | DIASTOLIC BLOOD PRESSURE: 80 MMHG | BODY MASS INDEX: 38.33 KG/M2 | OXYGEN SATURATION: 96 % | HEART RATE: 66 BPM | WEIGHT: 244.49 LBS | WEIGHT: 252.21 LBS | WEIGHT: 260.8 LBS | SYSTOLIC BLOOD PRESSURE: 148 MMHG | TEMPERATURE: 97.5 F | RESPIRATION RATE: 20 BRPM | OXYGEN SATURATION: 94 % | WEIGHT: 254.19 LBS | OXYGEN SATURATION: 94 % | BODY MASS INDEX: 35.45 KG/M2 | DIASTOLIC BLOOD PRESSURE: 91 MMHG | TEMPERATURE: 98.6 F | BODY MASS INDEX: 38.02 KG/M2 | BODY MASS INDEX: 36.62 KG/M2 | HEART RATE: 64 BPM | TEMPERATURE: 98.5 F | OXYGEN SATURATION: 93 % | HEART RATE: 70 BPM | DIASTOLIC BLOOD PRESSURE: 90 MMHG | BODY MASS INDEX: 38 KG/M2 | WEIGHT: 262.57 LBS | SYSTOLIC BLOOD PRESSURE: 150 MMHG | BODY MASS INDEX: 39.71 KG/M2 | TEMPERATURE: 98.7 F | RESPIRATION RATE: 18 BRPM | SYSTOLIC BLOOD PRESSURE: 162 MMHG | RESPIRATION RATE: 18 BRPM | RESPIRATION RATE: 16 BRPM | OXYGEN SATURATION: 95 % | WEIGHT: 263.89 LBS | TEMPERATURE: 98.1 F | BODY MASS INDEX: 37.92 KG/M2 | HEART RATE: 67 BPM | DIASTOLIC BLOOD PRESSURE: 83 MMHG | SYSTOLIC BLOOD PRESSURE: 154 MMHG | WEIGHT: 249.78 LBS | RESPIRATION RATE: 20 BRPM | BODY MASS INDEX: 39.09 KG/M2 | OXYGEN SATURATION: 95 % | HEART RATE: 74 BPM | WEIGHT: 255.51 LBS | BODY MASS INDEX: 37.26 KG/M2 | OXYGEN SATURATION: 95 % | TEMPERATURE: 98.6 F | HEART RATE: 79 BPM | SYSTOLIC BLOOD PRESSURE: 134 MMHG | OXYGEN SATURATION: 94 % | HEART RATE: 67 BPM | RESPIRATION RATE: 20 BRPM | DIASTOLIC BLOOD PRESSURE: 73 MMHG | SYSTOLIC BLOOD PRESSURE: 152 MMHG | TEMPERATURE: 97.9 F | TEMPERATURE: 99 F

## 2021-01-01 VITALS
HEART RATE: 70 BPM | SYSTOLIC BLOOD PRESSURE: 141 MMHG | WEIGHT: 253.31 LBS | OXYGEN SATURATION: 95 % | DIASTOLIC BLOOD PRESSURE: 77 MMHG | BODY MASS INDEX: 37.52 KG/M2 | TEMPERATURE: 97.5 F | RESPIRATION RATE: 20 BRPM | HEIGHT: 69 IN

## 2021-01-01 VITALS
TEMPERATURE: 97.1 F | RESPIRATION RATE: 22 BRPM | RESPIRATION RATE: 18 BRPM | BODY MASS INDEX: 33.82 KG/M2 | SYSTOLIC BLOOD PRESSURE: 136 MMHG | SYSTOLIC BLOOD PRESSURE: 128 MMHG | OXYGEN SATURATION: 95 % | SYSTOLIC BLOOD PRESSURE: 115 MMHG | RESPIRATION RATE: 18 BRPM | OXYGEN SATURATION: 96 % | HEART RATE: 68 BPM | SYSTOLIC BLOOD PRESSURE: 135 MMHG | BODY MASS INDEX: 35.91 KG/M2 | WEIGHT: 257.94 LBS | WEIGHT: 253.53 LBS | SYSTOLIC BLOOD PRESSURE: 153 MMHG | RESPIRATION RATE: 18 BRPM | OXYGEN SATURATION: 98 % | TEMPERATURE: 97.9 F | HEART RATE: 68 BPM | OXYGEN SATURATION: 96 % | HEART RATE: 69 BPM | OXYGEN SATURATION: 94 % | TEMPERATURE: 95.1 F | DIASTOLIC BLOOD PRESSURE: 85 MMHG | HEART RATE: 68 BPM | HEART RATE: 69 BPM | TEMPERATURE: 98.4 F | DIASTOLIC BLOOD PRESSURE: 64 MMHG | RESPIRATION RATE: 16 BRPM | TEMPERATURE: 98.1 F | TEMPERATURE: 97 F | BODY MASS INDEX: 32.58 KG/M2 | DIASTOLIC BLOOD PRESSURE: 71 MMHG | DIASTOLIC BLOOD PRESSURE: 64 MMHG | OXYGEN SATURATION: 96 % | HEART RATE: 66 BPM | BODY MASS INDEX: 36.82 KG/M2 | BODY MASS INDEX: 35.36 KG/M2 | BODY MASS INDEX: 35.98 KG/M2 | WEIGHT: 257.5 LBS | WEIGHT: 227.07 LBS | RESPIRATION RATE: 16 BRPM | DIASTOLIC BLOOD PRESSURE: 87 MMHG | DIASTOLIC BLOOD PRESSURE: 73 MMHG | WEIGHT: 242.51 LBS | SYSTOLIC BLOOD PRESSURE: 141 MMHG | WEIGHT: 250.88 LBS

## 2021-01-01 VITALS
BODY MASS INDEX: 31.53 KG/M2 | RESPIRATION RATE: 20 BRPM | SYSTOLIC BLOOD PRESSURE: 134 MMHG | HEART RATE: 85 BPM | OXYGEN SATURATION: 95 % | DIASTOLIC BLOOD PRESSURE: 87 MMHG | HEIGHT: 70 IN | WEIGHT: 220.24 LBS | TEMPERATURE: 97.5 F

## 2021-01-01 VITALS
RESPIRATION RATE: 20 BRPM | DIASTOLIC BLOOD PRESSURE: 63 MMHG | SYSTOLIC BLOOD PRESSURE: 103 MMHG | OXYGEN SATURATION: 91 % | HEART RATE: 72 BPM | TEMPERATURE: 98 F

## 2021-01-01 VITALS
OXYGEN SATURATION: 99 % | HEART RATE: 76 BPM | WEIGHT: 210.1 LBS | DIASTOLIC BLOOD PRESSURE: 66 MMHG | RESPIRATION RATE: 16 BRPM | BODY MASS INDEX: 30.15 KG/M2 | SYSTOLIC BLOOD PRESSURE: 118 MMHG | TEMPERATURE: 97.6 F

## 2021-01-01 VITALS — WEIGHT: 209.66 LBS | BODY MASS INDEX: 30.08 KG/M2

## 2021-01-01 VITALS
TEMPERATURE: 97.4 F | HEART RATE: 73 BPM | RESPIRATION RATE: 18 BRPM | DIASTOLIC BLOOD PRESSURE: 66 MMHG | BODY MASS INDEX: 31.89 KG/M2 | SYSTOLIC BLOOD PRESSURE: 117 MMHG | WEIGHT: 222.22 LBS | OXYGEN SATURATION: 96 %

## 2021-01-01 VITALS
DIASTOLIC BLOOD PRESSURE: 75 MMHG | WEIGHT: 238.25 LBS | HEART RATE: 75 BPM | BODY MASS INDEX: 34.11 KG/M2 | SYSTOLIC BLOOD PRESSURE: 118 MMHG | TEMPERATURE: 99.3 F | HEIGHT: 70 IN

## 2021-01-01 VITALS
BODY MASS INDEX: 28.25 KG/M2 | OXYGEN SATURATION: 95 % | RESPIRATION RATE: 20 BRPM | DIASTOLIC BLOOD PRESSURE: 79 MMHG | HEART RATE: 71 BPM | HEIGHT: 70 IN | SYSTOLIC BLOOD PRESSURE: 130 MMHG | TEMPERATURE: 97 F | WEIGHT: 197.31 LBS

## 2021-01-01 VITALS
DIASTOLIC BLOOD PRESSURE: 55 MMHG | HEIGHT: 70 IN | TEMPERATURE: 98.4 F | HEART RATE: 84 BPM | SYSTOLIC BLOOD PRESSURE: 90 MMHG | RESPIRATION RATE: 16 BRPM | BODY MASS INDEX: 30.35 KG/M2 | WEIGHT: 212 LBS | OXYGEN SATURATION: 94 %

## 2021-01-01 VITALS — BODY MASS INDEX: 29.28 KG/M2 | WEIGHT: 207.01 LBS

## 2021-01-01 VITALS
OXYGEN SATURATION: 85 % | HEART RATE: 99 BPM | TEMPERATURE: 98.5 F | SYSTOLIC BLOOD PRESSURE: 86 MMHG | DIASTOLIC BLOOD PRESSURE: 48 MMHG | RESPIRATION RATE: 14 BRPM | WEIGHT: 209 LBS | BODY MASS INDEX: 29.99 KG/M2

## 2021-01-01 VITALS
BODY MASS INDEX: 28.13 KG/M2 | OXYGEN SATURATION: 97 % | HEART RATE: 103 BPM | TEMPERATURE: 97.6 F | SYSTOLIC BLOOD PRESSURE: 95 MMHG | RESPIRATION RATE: 16 BRPM | WEIGHT: 198.85 LBS | DIASTOLIC BLOOD PRESSURE: 70 MMHG

## 2021-01-01 VITALS
RESPIRATION RATE: 16 BRPM | DIASTOLIC BLOOD PRESSURE: 68 MMHG | BODY MASS INDEX: 29.26 KG/M2 | TEMPERATURE: 98 F | HEIGHT: 71 IN | OXYGEN SATURATION: 98 % | SYSTOLIC BLOOD PRESSURE: 130 MMHG | WEIGHT: 209 LBS | HEART RATE: 107 BPM

## 2021-01-01 DIAGNOSIS — C16.0 ADENOCARCINOMA OF CARDIO-ESOPHAGEAL JUNCTION (HCC): ICD-10-CM

## 2021-01-01 DIAGNOSIS — C15.5 MALIGNANT NEOPLASM OF LOWER THIRD OF ESOPHAGUS (HCC): Primary | ICD-10-CM

## 2021-01-01 DIAGNOSIS — C15.5 MALIGNANT NEOPLASM OF LOWER THIRD OF ESOPHAGUS (HCC): ICD-10-CM

## 2021-01-01 DIAGNOSIS — C15.9 ESOPHAGEAL ADENOCARCINOMA (HCC): ICD-10-CM

## 2021-01-01 DIAGNOSIS — R13.10 ODYNOPHAGIA: Primary | ICD-10-CM

## 2021-01-01 DIAGNOSIS — C15.5 CANCER OF LOWER THIRD OF ESOPHAGUS (HCC): Primary | ICD-10-CM

## 2021-01-01 DIAGNOSIS — R07.89 CHEST DISCOMFORT: ICD-10-CM

## 2021-01-01 DIAGNOSIS — C15.9 ESOPHAGEAL ADENOCARCINOMA (HCC): Primary | ICD-10-CM

## 2021-01-01 DIAGNOSIS — R07.89 CHEST DISCOMFORT: Primary | ICD-10-CM

## 2021-01-01 DIAGNOSIS — R60.0 EDEMA OF EXTREMITY: ICD-10-CM

## 2021-01-01 DIAGNOSIS — M79.641 RIGHT HAND PAIN: Primary | ICD-10-CM

## 2021-01-01 DIAGNOSIS — M06.9 RHEUMATOID ARTHRITIS INVOLVING RIGHT HAND, UNSPECIFIED WHETHER RHEUMATOID FACTOR PRESENT (HCC): ICD-10-CM

## 2021-01-01 DIAGNOSIS — E86.0 DEHYDRATION: Primary | ICD-10-CM

## 2021-01-01 DIAGNOSIS — C16.0 ADENOCARCINOMA OF CARDIO-ESOPHAGEAL JUNCTION (HCC): Primary | ICD-10-CM

## 2021-01-01 LAB
ALBUMIN SERPL-MCNC: 2.55 G/DL (ref 3.5–5.2)
ALBUMIN SERPL-MCNC: 2.72 G/DL (ref 3.5–5.2)
ALBUMIN SERPL-MCNC: 2.8 G/DL (ref 3.5–5)
ALBUMIN/GLOB SERPL: 0.7 G/DL
ALBUMIN/GLOB SERPL: 0.7 {RATIO} (ref 1.4–2.6)
ALBUMIN/GLOB SERPL: 1 G/DL
ALP SERPL-CCNC: 289 U/L (ref 56–155)
ALP SERPL-CCNC: 393 U/L (ref 39–117)
ALP SERPL-CCNC: 521 U/L (ref 39–117)
ALT SERPL W P-5'-P-CCNC: 28 U/L (ref 1–41)
ALT SERPL W P-5'-P-CCNC: 50 U/L (ref 1–41)
ALT SERPL-CCNC: 27 U/L (ref 10–40)
ANION GAP SERPL CALC-SCNC: 12 MMOL/L (ref 8–19)
ANION GAP SERPL CALCULATED.3IONS-SCNC: 5.5 MMOL/L (ref 5–15)
ANION GAP SERPL CALCULATED.3IONS-SCNC: 8 MMOL/L (ref 5–15)
AST SERPL-CCNC: 33 U/L (ref 15–50)
AST SERPL-CCNC: 37 U/L (ref 1–40)
AST SERPL-CCNC: 65 U/L (ref 1–40)
BASOPHILS # BLD AUTO: 0 10*3/MM3 (ref 0–0.2)
BASOPHILS # BLD AUTO: 0.01 10*3/MM3 (ref 0–0.2)
BASOPHILS # BLD AUTO: 0.05 10*3/UL (ref 0–0.2)
BASOPHILS NFR BLD AUTO: 0 % (ref 0–1.5)
BASOPHILS NFR BLD AUTO: 0.1 % (ref 0–1.5)
BASOPHILS NFR BLD AUTO: 0.4 % (ref 0–3)
BH CV UPPER VENOUS LEFT AXILLARY AUGMENT: NORMAL
BH CV UPPER VENOUS LEFT AXILLARY COMPETENT: NORMAL
BH CV UPPER VENOUS LEFT AXILLARY COMPRESS: NORMAL
BH CV UPPER VENOUS LEFT AXILLARY PHASIC: NORMAL
BH CV UPPER VENOUS LEFT AXILLARY SPONT: NORMAL
BH CV UPPER VENOUS LEFT BASILIC FOREARM COMPRESS: NORMAL
BH CV UPPER VENOUS LEFT BASILIC UPPER COMPRESS: NORMAL
BH CV UPPER VENOUS LEFT BRACHIAL COMPRESS: NORMAL
BH CV UPPER VENOUS LEFT CEPHALIC FOREARM COMPRESS: NORMAL
BH CV UPPER VENOUS LEFT CEPHALIC UPPER COMPRESS: NORMAL
BH CV UPPER VENOUS LEFT INTERNAL JUGULAR AUGMENT: NORMAL
BH CV UPPER VENOUS LEFT INTERNAL JUGULAR COMPETENT: NORMAL
BH CV UPPER VENOUS LEFT INTERNAL JUGULAR COMPRESS: NORMAL
BH CV UPPER VENOUS LEFT INTERNAL JUGULAR PHASIC: NORMAL
BH CV UPPER VENOUS LEFT INTERNAL JUGULAR SPONT: NORMAL
BH CV UPPER VENOUS LEFT RADIAL COMPRESS: NORMAL
BH CV UPPER VENOUS LEFT SUBCLAVIAN AUGMENT: NORMAL
BH CV UPPER VENOUS LEFT SUBCLAVIAN COMPETENT: NORMAL
BH CV UPPER VENOUS LEFT SUBCLAVIAN COMPRESS: NORMAL
BH CV UPPER VENOUS LEFT SUBCLAVIAN PHASIC: NORMAL
BH CV UPPER VENOUS LEFT SUBCLAVIAN SPONT: NORMAL
BH CV UPPER VENOUS LEFT ULNAR COMPRESS: NORMAL
BH CV UPPER VENOUS RIGHT INTERNAL JUGULAR AUGMENT: NORMAL
BH CV UPPER VENOUS RIGHT INTERNAL JUGULAR COMPETENT: NORMAL
BH CV UPPER VENOUS RIGHT INTERNAL JUGULAR COMPRESS: NORMAL
BH CV UPPER VENOUS RIGHT INTERNAL JUGULAR PHASIC: NORMAL
BH CV UPPER VENOUS RIGHT INTERNAL JUGULAR SPONT: NORMAL
BH CV UPPER VENOUS RIGHT SUBCLAVIAN AUGMENT: NORMAL
BH CV UPPER VENOUS RIGHT SUBCLAVIAN COMPETENT: NORMAL
BH CV UPPER VENOUS RIGHT SUBCLAVIAN COMPRESS: NORMAL
BH CV UPPER VENOUS RIGHT SUBCLAVIAN PHASIC: NORMAL
BH CV UPPER VENOUS RIGHT SUBCLAVIAN SPONT: NORMAL
BILIRUB SERPL-MCNC: 0.72 MG/DL (ref 0.2–1.3)
BILIRUB SERPL-MCNC: 1.1 MG/DL (ref 0–1.2)
BILIRUB SERPL-MCNC: 1.7 MG/DL (ref 0–1.2)
BUN SERPL-MCNC: 14 MG/DL (ref 5–25)
BUN SERPL-MCNC: 21 MG/DL (ref 8–23)
BUN SERPL-MCNC: 27 MG/DL (ref 8–23)
BUN/CREAT SERPL: 17 {RATIO} (ref 6–20)
BUN/CREAT SERPL: 27.3 (ref 7–25)
BUN/CREAT SERPL: 29.3 (ref 7–25)
CALCIUM SERPL-MCNC: 10.4 MG/DL (ref 8.7–10.4)
CALCIUM SPEC-SCNC: 8.4 MG/DL (ref 8.6–10.5)
CALCIUM SPEC-SCNC: 9.5 MG/DL (ref 8.6–10.5)
CHLORIDE SERPL-SCNC: 101 MMOL/L (ref 99–111)
CHLORIDE SERPL-SCNC: 94 MMOL/L (ref 98–107)
CHLORIDE SERPL-SCNC: 99 MMOL/L (ref 98–107)
CO2 SERPL-SCNC: 28.5 MMOL/L (ref 22–29)
CO2 SERPL-SCNC: 33 MMOL/L (ref 22–29)
CONV ABS IMM GRAN: 0.12 10*3/UL (ref 0–0.2)
CONV CO2: 30 MMOL/L (ref 22–32)
CONV IMMATURE GRAN: 1 % (ref 0–1.8)
CONV TOTAL PROTEIN: 7.1 G/DL (ref 6.3–8.2)
CREAT BLD-MCNC: 0.8 MG/DL (ref 0.6–1.4)
CREAT BLD-MCNC: 1.1 MG/DL (ref 0.6–1.4)
CREAT SERPL-MCNC: 0.77 MG/DL (ref 0.76–1.27)
CREAT SERPL-MCNC: 0.92 MG/DL (ref 0.76–1.27)
CREAT UR-MCNC: 0.82 MG/DL (ref 0.7–1.2)
CYTO UR: NORMAL
DEPRECATED RDW RBC AUTO: 63.4 FL (ref 35.1–43.9)
DEPRECATED RDW RBC AUTO: 63.5 FL (ref 37–54)
DEPRECATED RDW RBC AUTO: 70.5 FL (ref 37–54)
EOSINOPHIL # BLD AUTO: 0 10*3/MM3 (ref 0–0.4)
EOSINOPHIL # BLD AUTO: 0 10*3/MM3 (ref 0–0.4)
EOSINOPHIL # BLD AUTO: 0.09 10*3/UL (ref 0–0.7)
EOSINOPHIL # BLD AUTO: 0.7 % (ref 0–7)
EOSINOPHIL NFR BLD AUTO: 0 % (ref 0.3–6.2)
EOSINOPHIL NFR BLD AUTO: 0 % (ref 0.3–6.2)
ERYTHROCYTE [DISTWIDTH] IN BLOOD BY AUTOMATED COUNT: 19.4 % (ref 12.3–15.4)
ERYTHROCYTE [DISTWIDTH] IN BLOOD BY AUTOMATED COUNT: 20.2 % (ref 11.6–14.4)
ERYTHROCYTE [DISTWIDTH] IN BLOOD BY AUTOMATED COUNT: 21.9 % (ref 12.3–15.4)
GFR SERPL CREATININE-BSD FRML MDRD: 100 ML/MIN/1.73
GFR SERPL CREATININE-BSD FRML MDRD: 82 ML/MIN/1.73
GFR SERPLBLD BASED ON 1.73 SQ M-ARVRAT: >60 ML/MIN/{1.73_M2}
GLOBULIN UR ELPH-MCNC: 2.7 GM/DL
GLOBULIN UR ELPH-MCNC: 3.8 GM/DL
GLOBULIN UR ELPH-MCNC: 4.3 G/DL (ref 2–3.5)
GLUCOSE SERPL-MCNC: 123 MG/DL (ref 65–99)
GLUCOSE SERPL-MCNC: 212 MG/DL (ref 65–99)
GLUCOSE SERPL-MCNC: 90 MG/DL (ref 70–99)
HCT VFR BLD AUTO: 38.2 % (ref 42–52)
HCT VFR BLD AUTO: 39.3 % (ref 37.5–51)
HCT VFR BLD AUTO: 40.1 % (ref 37.5–51)
HGB BLD-MCNC: 11.4 G/DL (ref 14–18)
HGB BLD-MCNC: 11.8 G/DL (ref 13–17.7)
HGB BLD-MCNC: 12.1 G/DL (ref 13–17.7)
IMM GRANULOCYTES # BLD AUTO: 0.06 10*3/MM3 (ref 0–0.05)
IMM GRANULOCYTES # BLD AUTO: 0.08 10*3/MM3 (ref 0–0.05)
IMM GRANULOCYTES NFR BLD AUTO: 0.4 % (ref 0–0.5)
IMM GRANULOCYTES NFR BLD AUTO: 0.7 % (ref 0–0.5)
LAB AP CASE REPORT: NORMAL
LAB AP CLINICAL INFORMATION: NORMAL
LAB AP SPECIAL STAINS: NORMAL
LYMPHOCYTES # BLD AUTO: 0.36 10*3/MM3 (ref 0.7–3.1)
LYMPHOCYTES # BLD AUTO: 0.39 10*3/MM3 (ref 0.7–3.1)
LYMPHOCYTES # BLD AUTO: 0.74 10*3/UL (ref 1–5)
LYMPHOCYTES NFR BLD AUTO: 2.8 % (ref 19.6–45.3)
LYMPHOCYTES NFR BLD AUTO: 2.9 % (ref 19.6–45.3)
LYMPHOCYTES NFR BLD AUTO: 5.9 % (ref 20–45)
MAXIMAL PREDICTED HEART RATE: 152 BPM
MCH RBC QN AUTO: 26.2 PG (ref 27–31)
MCH RBC QN AUTO: 26.3 PG (ref 26.6–33)
MCH RBC QN AUTO: 27.7 PG (ref 26.6–33)
MCHC RBC AUTO-ENTMCNC: 29.4 G/DL (ref 31.5–35.7)
MCHC RBC AUTO-ENTMCNC: 29.8 G/DL (ref 33–37)
MCHC RBC AUTO-ENTMCNC: 30.8 G/DL (ref 31.5–35.7)
MCV RBC AUTO: 87.8 FL (ref 80–96)
MCV RBC AUTO: 89.5 FL (ref 79–97)
MCV RBC AUTO: 89.9 FL (ref 79–97)
MONOCYTES # BLD AUTO: 0.14 10*3/MM3 (ref 0.1–0.9)
MONOCYTES # BLD AUTO: 0.3 10*3/MM3 (ref 0.1–0.9)
MONOCYTES # BLD AUTO: 1.1 10*3/UL (ref 0.2–1.2)
MONOCYTES NFR BLD AUTO: 1 % (ref 5–12)
MONOCYTES NFR BLD AUTO: 2.5 % (ref 5–12)
MONOCYTES NFR BLD AUTO: 8.8 % (ref 3–10)
NEUTROPHILS # BLD AUTO: 10.47 10*3/UL (ref 2–8)
NEUTROPHILS NFR BLD AUTO: 11.47 10*3/MM3 (ref 1.7–7)
NEUTROPHILS NFR BLD AUTO: 13.53 10*3/MM3 (ref 1.7–7)
NEUTROPHILS NFR BLD AUTO: 83.2 % (ref 30–85)
NEUTROPHILS NFR BLD AUTO: 93.9 % (ref 42.7–76)
NEUTROPHILS NFR BLD AUTO: 95.7 % (ref 42.7–76)
NRBC CBCN: 0 % (ref 0–0.7)
OSMOLALITY SERPL CALC.SUM OF ELEC: 288 MOSM/KG (ref 273–304)
PATH REPORT.FINAL DX SPEC: NORMAL
PATH REPORT.GROSS SPEC: NORMAL
PLATELET # BLD AUTO: 108 10*3/MM3 (ref 140–450)
PLATELET # BLD AUTO: 238 10*3/MM3 (ref 140–450)
PLATELET # BLD AUTO: 296 10*3/UL (ref 130–400)
PMV BLD AUTO: 10.3 FL (ref 6–12)
PMV BLD AUTO: 10.4 FL (ref 9.4–12.4)
PMV BLD AUTO: 11.4 FL (ref 6–12)
POTASSIUM SERPL-SCNC: 3.1 MMOL/L (ref 3.5–5.2)
POTASSIUM SERPL-SCNC: 4.2 MMOL/L (ref 3.5–5.2)
POTASSIUM SERPL-SCNC: 4.3 MMOL/L (ref 3.5–5.3)
PROT SERPL-MCNC: 5.2 G/DL (ref 6–8.5)
PROT SERPL-MCNC: 6.5 G/DL (ref 6–8.5)
RBC # BLD AUTO: 4.35 10*6/UL (ref 4.7–6.1)
RBC # BLD AUTO: 4.37 10*6/MM3 (ref 4.14–5.8)
RBC # BLD AUTO: 4.48 10*6/MM3 (ref 4.14–5.8)
SARS-COV-2 RNA SPEC QL NAA+PROBE: NOT DETECTED
SODIUM SERPL-SCNC: 133 MMOL/L (ref 136–145)
SODIUM SERPL-SCNC: 135 MMOL/L (ref 136–145)
SODIUM SERPL-SCNC: 139 MMOL/L (ref 135–147)
STRESS TARGET HR: 129 BPM
WBC # BLD AUTO: 12.21 10*3/MM3 (ref 3.4–10.8)
WBC # BLD AUTO: 12.57 10*3/UL (ref 4.8–10.8)
WBC # BLD AUTO: 14.13 10*3/MM3 (ref 3.4–10.8)

## 2021-01-01 PROCEDURE — 25010000002 METHYLPREDNISOLONE PER 125 MG: Performed by: NURSE ANESTHETIST, CERTIFIED REGISTERED

## 2021-01-01 PROCEDURE — 77427 RADIATION TX MANAGEMENT X5: CPT | Performed by: RADIOLOGY

## 2021-01-01 PROCEDURE — 88342 IMHCHEM/IMCYTCHM 1ST ANTB: CPT | Performed by: INTERNAL MEDICINE

## 2021-01-01 PROCEDURE — 43239 EGD BIOPSY SINGLE/MULTIPLE: CPT | Performed by: INTERNAL MEDICINE

## 2021-01-01 PROCEDURE — 25010000002 HEPARIN LOCK FLUSH PER 10 UNITS: Performed by: INTERNAL MEDICINE

## 2021-01-01 PROCEDURE — 77014 CHG CT GUIDANCE RADIATION THERAPY FLDS PLACEMENT: CPT | Performed by: RADIOLOGY

## 2021-01-01 PROCEDURE — 77300 RADIATION THERAPY DOSE PLAN: CPT | Performed by: RADIOLOGY

## 2021-01-01 PROCEDURE — 99214 OFFICE O/P EST MOD 30 MIN: CPT | Performed by: INTERNAL MEDICINE

## 2021-01-01 PROCEDURE — 77387 GUIDANCE FOR RADJ TX DLVR: CPT | Performed by: RADIOLOGY

## 2021-01-01 PROCEDURE — 77332 RADIATION TREATMENT AID(S): CPT | Performed by: RADIOLOGY

## 2021-01-01 PROCEDURE — 96413 CHEMO IV INFUSION 1 HR: CPT

## 2021-01-01 PROCEDURE — 80053 COMPREHEN METABOLIC PANEL: CPT | Performed by: INTERNAL MEDICINE

## 2021-01-01 PROCEDURE — 85025 COMPLETE CBC W/AUTO DIFF WBC: CPT | Performed by: INTERNAL MEDICINE

## 2021-01-01 PROCEDURE — 25010000002 HEPARIN LOCK FLUSH PER 10 UNITS: Performed by: ANESTHESIOLOGY

## 2021-01-01 PROCEDURE — 77280 THER RAD SIMULAJ FIELD SMPL: CPT | Performed by: RADIOLOGY

## 2021-01-01 PROCEDURE — 77334 RADIATION TREATMENT AID(S): CPT | Performed by: RADIOLOGY

## 2021-01-01 PROCEDURE — 77412 RADIATION TX DELIVERY LVL 3: CPT | Performed by: RADIOLOGY

## 2021-01-01 PROCEDURE — 96360 HYDRATION IV INFUSION INIT: CPT

## 2021-01-01 PROCEDURE — 93971 EXTREMITY STUDY: CPT

## 2021-01-01 PROCEDURE — 77295 3-D RADIOTHERAPY PLAN: CPT | Performed by: RADIOLOGY

## 2021-01-01 PROCEDURE — 74019 RADEX ABDOMEN 2 VIEWS: CPT

## 2021-01-01 PROCEDURE — 25010000002 PROPOFOL 10 MG/ML EMULSION: Performed by: NURSE ANESTHETIST, CERTIFIED REGISTERED

## 2021-01-01 PROCEDURE — 25010000002 DOCETAXEL 80 MG/8ML SOLUTION 8 ML VIAL: Performed by: INTERNAL MEDICINE

## 2021-01-01 PROCEDURE — 25010000002 HYDROMORPHONE 1 MG/ML SOLUTION: Performed by: ANESTHESIOLOGY

## 2021-01-01 PROCEDURE — 25010000002 HEPARIN LOCK FLUSH PER 10 UNITS: Performed by: RADIOLOGY

## 2021-01-01 PROCEDURE — 88341 IMHCHEM/IMCYTCHM EA ADD ANTB: CPT | Performed by: INTERNAL MEDICINE

## 2021-01-01 PROCEDURE — 77336 RADIATION PHYSICS CONSULT: CPT | Performed by: RADIOLOGY

## 2021-01-01 PROCEDURE — 99204 OFFICE O/P NEW MOD 45 MIN: CPT | Performed by: RADIOLOGY

## 2021-01-01 PROCEDURE — 88305 TISSUE EXAM BY PATHOLOGIST: CPT | Performed by: INTERNAL MEDICINE

## 2021-01-01 PROCEDURE — 77290 THER RAD SIMULAJ FIELD CPLX: CPT | Performed by: RADIOLOGY

## 2021-01-01 PROCEDURE — 77263 THER RADIOLOGY TX PLNG CPLX: CPT | Performed by: RADIOLOGY

## 2021-01-01 PROCEDURE — 93971 EXTREMITY STUDY: CPT | Performed by: SURGERY

## 2021-01-01 PROCEDURE — 88360 TUMOR IMMUNOHISTOCHEM/MANUAL: CPT | Performed by: INTERNAL MEDICINE

## 2021-01-01 RX ORDER — SODIUM CHLORIDE 9 MG/ML
250 INJECTION, SOLUTION INTRAVENOUS ONCE
Status: COMPLETED | OUTPATIENT
Start: 2021-01-01 | End: 2021-01-01

## 2021-01-01 RX ORDER — DIPHENHYDRAMINE HYDROCHLORIDE 50 MG/ML
50 INJECTION INTRAMUSCULAR; INTRAVENOUS AS NEEDED
Status: DISCONTINUED | OUTPATIENT
Start: 2021-01-01 | End: 2021-01-01

## 2021-01-01 RX ORDER — DIPHENHYDRAMINE HYDROCHLORIDE 50 MG/ML
50 INJECTION INTRAMUSCULAR; INTRAVENOUS AS NEEDED
Status: CANCELLED | OUTPATIENT
Start: 2021-01-01

## 2021-01-01 RX ORDER — SODIUM CHLORIDE, SODIUM LACTATE, POTASSIUM CHLORIDE, CALCIUM CHLORIDE 600; 310; 30; 20 MG/100ML; MG/100ML; MG/100ML; MG/100ML
9 INJECTION, SOLUTION INTRAVENOUS CONTINUOUS
Status: DISCONTINUED | OUTPATIENT
Start: 2021-01-01 | End: 2021-01-01 | Stop reason: HOSPADM

## 2021-01-01 RX ORDER — ONDANSETRON HYDROCHLORIDE 8 MG/1
8 TABLET, FILM COATED ORAL EVERY 8 HOURS PRN
COMMUNITY
Start: 2021-01-01

## 2021-01-01 RX ORDER — SODIUM CHLORIDE 0.9 % (FLUSH) 0.9 %
10 SYRINGE (ML) INJECTION EVERY 12 HOURS SCHEDULED
Status: DISCONTINUED | OUTPATIENT
Start: 2021-01-01 | End: 2021-01-01 | Stop reason: HOSPADM

## 2021-01-01 RX ORDER — LEVOTHYROXINE SODIUM 13 UG/1
CAPSULE ORAL
COMMUNITY

## 2021-01-01 RX ORDER — SODIUM CHLORIDE 0.9 % (FLUSH) 0.9 %
20 SYRINGE (ML) INJECTION AS NEEDED
Status: CANCELLED | OUTPATIENT
Start: 2021-01-01

## 2021-01-01 RX ORDER — HEPARIN SODIUM (PORCINE) LOCK FLUSH IV SOLN 100 UNIT/ML 100 UNIT/ML
500 SOLUTION INTRAVENOUS AS NEEDED
Status: DISCONTINUED | OUTPATIENT
Start: 2021-01-01 | End: 2021-01-01 | Stop reason: HOSPADM

## 2021-01-01 RX ORDER — HEPARIN SODIUM (PORCINE) LOCK FLUSH IV SOLN 100 UNIT/ML 100 UNIT/ML
500 SOLUTION INTRAVENOUS AS NEEDED
Status: CANCELLED | OUTPATIENT
Start: 2021-01-01

## 2021-01-01 RX ORDER — HYDROCODONE BITARTRATE AND ACETAMINOPHEN 7.5; 325 MG/1; MG/1
TABLET ORAL
COMMUNITY
Start: 2021-01-01 | End: 2021-01-01

## 2021-01-01 RX ORDER — SODIUM CHLORIDE 0.9 % (FLUSH) 0.9 %
10 SYRINGE (ML) INJECTION AS NEEDED
Status: CANCELLED | OUTPATIENT
Start: 2021-01-01

## 2021-01-01 RX ORDER — SODIUM CHLORIDE 0.9 % (FLUSH) 0.9 %
3 SYRINGE (ML) INJECTION EVERY 12 HOURS SCHEDULED
Status: DISCONTINUED | OUTPATIENT
Start: 2021-01-01 | End: 2021-01-01 | Stop reason: HOSPADM

## 2021-01-01 RX ORDER — HYDROMORPHONE HCL 110MG/55ML
0.25 PATIENT CONTROLLED ANALGESIA SYRINGE INTRAVENOUS
Status: DISCONTINUED | OUTPATIENT
Start: 2021-01-01 | End: 2021-01-01 | Stop reason: SDUPTHER

## 2021-01-01 RX ORDER — PRAVASTATIN SODIUM 80 MG/1
TABLET ORAL
COMMUNITY
End: 2021-01-01

## 2021-01-01 RX ORDER — OXYCODONE HYDROCHLORIDE 5 MG/1
10 TABLET ORAL EVERY 6 HOURS PRN
COMMUNITY
Start: 2021-01-01

## 2021-01-01 RX ORDER — PROPOFOL 10 MG/ML
VIAL (ML) INTRAVENOUS AS NEEDED
Status: DISCONTINUED | OUTPATIENT
Start: 2021-01-01 | End: 2021-01-01 | Stop reason: SURG

## 2021-01-01 RX ORDER — FUROSEMIDE 40 MG/1
TABLET ORAL
COMMUNITY
End: 2021-01-01

## 2021-01-01 RX ORDER — LIDOCAINE HYDROCHLORIDE 20 MG/ML
INJECTION, SOLUTION INFILTRATION; PERINEURAL AS NEEDED
Status: DISCONTINUED | OUTPATIENT
Start: 2021-01-01 | End: 2021-01-01 | Stop reason: SURG

## 2021-01-01 RX ORDER — HEPARIN SODIUM (PORCINE) LOCK FLUSH IV SOLN 100 UNIT/ML 100 UNIT/ML
5 SOLUTION INTRAVENOUS AS NEEDED
Status: DISCONTINUED | OUTPATIENT
Start: 2021-01-01 | End: 2021-01-01 | Stop reason: HOSPADM

## 2021-01-01 RX ORDER — FAMOTIDINE 10 MG/ML
20 INJECTION, SOLUTION INTRAVENOUS AS NEEDED
Status: CANCELLED | OUTPATIENT
Start: 2021-01-01

## 2021-01-01 RX ORDER — PSYLLIUM HUSK (WITH SUGAR) 3 G/12 G
POWDER (GRAM) ORAL
COMMUNITY

## 2021-01-01 RX ORDER — IRBESARTAN 300 MG/1
TABLET ORAL
COMMUNITY

## 2021-01-01 RX ORDER — ASPIRIN 81 MG/1
TABLET ORAL
COMMUNITY

## 2021-01-01 RX ORDER — DULOXETIN HYDROCHLORIDE 60 MG/1
60 CAPSULE, DELAYED RELEASE ORAL DAILY
Qty: 30 CAPSULE | Refills: 2 | Status: SHIPPED | OUTPATIENT
Start: 2021-01-01

## 2021-01-01 RX ORDER — SODIUM CHLORIDE 9 MG/ML
500 INJECTION, SOLUTION INTRAVENOUS ONCE
Status: CANCELLED | OUTPATIENT
Start: 2021-01-01

## 2021-01-01 RX ORDER — FAMOTIDINE 10 MG/ML
20 INJECTION, SOLUTION INTRAVENOUS AS NEEDED
Status: DISCONTINUED | OUTPATIENT
Start: 2021-01-01 | End: 2021-01-01

## 2021-01-01 RX ORDER — PHENYLEPHRINE HCL IN 0.9% NACL 1 MG/10 ML
SYRINGE (ML) INTRAVENOUS AS NEEDED
Status: DISCONTINUED | OUTPATIENT
Start: 2021-01-01 | End: 2021-01-01 | Stop reason: SURG

## 2021-01-01 RX ORDER — PREDNISONE 1 MG/1
5 TABLET ORAL DAILY
COMMUNITY
Start: 2021-01-01 | End: 2021-01-01

## 2021-01-01 RX ORDER — HEPARIN SODIUM (PORCINE) LOCK FLUSH IV SOLN 100 UNIT/ML 100 UNIT/ML
400 SOLUTION INTRAVENOUS AS NEEDED
Status: DISCONTINUED | OUTPATIENT
Start: 2021-01-01 | End: 2021-01-01 | Stop reason: HOSPADM

## 2021-01-01 RX ORDER — FLUCONAZOLE 100 MG/1
100 TABLET ORAL DAILY
Qty: 10 TABLET | Refills: 0 | Status: SHIPPED | OUTPATIENT
Start: 2021-01-01 | End: 2021-01-01

## 2021-01-01 RX ORDER — SODIUM CHLORIDE 0.9 % (FLUSH) 0.9 %
20 SYRINGE (ML) INJECTION AS NEEDED
Status: DISCONTINUED | OUTPATIENT
Start: 2021-01-01 | End: 2021-01-01 | Stop reason: HOSPADM

## 2021-01-01 RX ORDER — CHOLECALCIFEROL (VITAMIN D3) 10(400)/ML
DROPS ORAL
COMMUNITY

## 2021-01-01 RX ORDER — HEPARIN SODIUM (PORCINE) LOCK FLUSH IV SOLN 100 UNIT/ML 100 UNIT/ML
400 SOLUTION INTRAVENOUS AS NEEDED
Status: CANCELLED | OUTPATIENT
Start: 2021-01-01

## 2021-01-01 RX ORDER — SODIUM CHLORIDE 9 MG/ML
250 INJECTION, SOLUTION INTRAVENOUS ONCE
Status: CANCELLED | OUTPATIENT
Start: 2021-01-01

## 2021-01-01 RX ORDER — GLUCOSAMINE SULF/CHONDROITIN A 500-250 MG
CAPSULE ORAL
COMMUNITY

## 2021-01-01 RX ORDER — DEXAMETHASONE 4 MG/1
TABLET ORAL
COMMUNITY
Start: 2021-01-01

## 2021-01-01 RX ORDER — METHYLPREDNISOLONE SODIUM SUCCINATE 125 MG/2ML
INJECTION, POWDER, LYOPHILIZED, FOR SOLUTION INTRAMUSCULAR; INTRAVENOUS AS NEEDED
Status: DISCONTINUED | OUTPATIENT
Start: 2021-01-01 | End: 2021-01-01 | Stop reason: SURG

## 2021-01-01 RX ORDER — CALCIUM CHLORIDE 100 MG/ML
INJECTION INTRAVENOUS; INTRAVENTRICULAR AS NEEDED
Status: DISCONTINUED | OUTPATIENT
Start: 2021-01-01 | End: 2021-01-01 | Stop reason: SURG

## 2021-01-01 RX ORDER — SODIUM CHLORIDE 0.9 % (FLUSH) 0.9 %
10 SYRINGE (ML) INJECTION AS NEEDED
Status: DISCONTINUED | OUTPATIENT
Start: 2021-01-01 | End: 2021-01-01 | Stop reason: HOSPADM

## 2021-01-01 RX ORDER — SODIUM CHLORIDE 9 MG/ML
500 INJECTION, SOLUTION INTRAVENOUS ONCE
Status: COMPLETED | OUTPATIENT
Start: 2021-01-01 | End: 2021-01-01

## 2021-01-01 RX ORDER — FOLIC ACID 1 MG/1
1 TABLET ORAL DAILY
COMMUNITY
Start: 2021-01-01

## 2021-01-01 RX ORDER — PANTOPRAZOLE SODIUM 40 MG/1
TABLET, DELAYED RELEASE ORAL
COMMUNITY
End: 2021-01-01

## 2021-01-01 RX ORDER — ALLOPURINOL 300 MG/1
TABLET ORAL
COMMUNITY
Start: 2021-01-01

## 2021-01-01 RX ADMIN — PROPOFOL 20 MG: 10 INJECTION, EMULSION INTRAVENOUS at 13:54

## 2021-01-01 RX ADMIN — SODIUM CHLORIDE, PRESERVATIVE FREE 20 ML: 5 INJECTION INTRAVENOUS at 12:30

## 2021-01-01 RX ADMIN — METHYLPREDNISOLONE SODIUM SUCCINATE 60 MG: 125 INJECTION, POWDER, FOR SOLUTION INTRAMUSCULAR; INTRAVENOUS at 14:03

## 2021-01-01 RX ADMIN — HEPARIN SODIUM (PORCINE) LOCK FLUSH IV SOLN 100 UNIT/ML 400 UNITS: 100 SOLUTION at 16:33

## 2021-01-01 RX ADMIN — SODIUM CHLORIDE 250 ML: 9 INJECTION, SOLUTION INTRAVENOUS at 11:16

## 2021-01-01 RX ADMIN — Medication 200 MCG: at 14:03

## 2021-01-01 RX ADMIN — Medication 200 MCG: at 14:09

## 2021-01-01 RX ADMIN — Medication 200 MCG: at 13:58

## 2021-01-01 RX ADMIN — CALCIUM CHLORIDE 300 G: 100 INJECTION INTRAVENOUS; INTRAVENTRICULAR at 14:06

## 2021-01-01 RX ADMIN — PROPOFOL 80 MG: 10 INJECTION, EMULSION INTRAVENOUS at 13:50

## 2021-01-01 RX ADMIN — SODIUM CHLORIDE 250 ML: 9 INJECTION, SOLUTION INTRAVENOUS at 11:08

## 2021-01-01 RX ADMIN — SODIUM CHLORIDE, POTASSIUM CHLORIDE, SODIUM LACTATE AND CALCIUM CHLORIDE 9 ML/HR: 600; 310; 30; 20 INJECTION, SOLUTION INTRAVENOUS at 12:55

## 2021-01-01 RX ADMIN — HEPARIN SODIUM (PORCINE) LOCK FLUSH IV SOLN 100 UNIT/ML 500 UNITS: 100 SOLUTION at 15:04

## 2021-01-01 RX ADMIN — PROPOFOL 20 MG: 10 INJECTION, EMULSION INTRAVENOUS at 14:00

## 2021-01-01 RX ADMIN — HEPARIN SODIUM (PORCINE) LOCK FLUSH IV SOLN 100 UNIT/ML 500 UNITS: 100 SOLUTION at 12:31

## 2021-01-01 RX ADMIN — LIDOCAINE HYDROCHLORIDE 80 MG: 20 INJECTION, SOLUTION INFILTRATION; PERINEURAL at 13:50

## 2021-01-01 RX ADMIN — DOCETAXEL ANHYDROUS 165 MG: 10 INJECTION, SOLUTION INTRAVENOUS at 11:13

## 2021-01-01 RX ADMIN — SODIUM CHLORIDE, PRESERVATIVE FREE 10 ML: 5 INJECTION INTRAVENOUS at 15:02

## 2021-01-01 RX ADMIN — PROPOFOL 20 MG: 10 INJECTION, EMULSION INTRAVENOUS at 14:06

## 2021-01-01 RX ADMIN — SODIUM CHLORIDE 500 ML/HR: 9 INJECTION, SOLUTION INTRAVENOUS at 15:21

## 2021-01-01 RX ADMIN — Medication 100 MCG: at 13:54

## 2021-01-01 RX ADMIN — DOCETAXEL ANHYDROUS 165 MG: 10 INJECTION, SOLUTION INTRAVENOUS at 11:11

## 2021-01-01 RX ADMIN — HYDROMORPHONE HYDROCHLORIDE 0.25 MG: 1 INJECTION, SOLUTION INTRAMUSCULAR; INTRAVENOUS; SUBCUTANEOUS at 13:20

## 2021-01-01 RX ADMIN — PROPOFOL 20 MG: 10 INJECTION, EMULSION INTRAVENOUS at 14:12

## 2021-01-01 RX ADMIN — SODIUM CHLORIDE, PRESERVATIVE FREE 20 ML: 5 INJECTION INTRAVENOUS at 12:22

## 2021-01-01 RX ADMIN — HEPARIN SODIUM (PORCINE) LOCK FLUSH IV SOLN 100 UNIT/ML 500 UNITS: 100 SOLUTION at 12:22

## 2021-05-10 NOTE — H&P
History and Physical      Patient Name: Gabriele Connolly   Patient ID: [id number]   Sex: Male   YOB: 1950        Create Date: January 15, 2021              Chief Complaint  · Surgical History and Physical  · Esophageal Cancer      History Of Present Illness  NON-INPATIENT HISTORY AND PHYSICAL  Allergies: no allergies   Chief Complaint/History of Present Illness: Esophageal Cancer / referral from Dr. Menchaca   Colon Recall: No   Failed Outpatient Treatment/Contraindications: N/A   Current Medications: MEDICATION LIST   Significant Past Medical History: PAST MEDICAL HISTORY   Significant Family Medical History: FAMILY MEDICAL HISTORY   Significant Past Surgical History: PAST SURGICAL HISTORY   Previous Colonoscopy: YES/NO   Previous EGD: YES/NO   PHYSICAL EXAM:  Heart: Regular Rate and Rhythm   Lungs: Breathing Unlabored       Allergy List    Allergies Reconciled      Assessment  · Preoperative examination     V72.84/Z01.818  · Esophageal cancer     150.9/C15.9      Plan  · Orders  o Consent for Esophagogastroduodenoscopy (EGD) with dilation - Possible risks/complications, benefits, and alternatives to surgical or invasive procedure have been explained to patient and/or legal guardian. - Patient has been evaluated and can tolerate anesthesia and/or sedation. Risks, benefits, and alternatives to anesthesia and sedation have been explained to patient and/or legal guardian. (21191) - 150.9/C15.9 - 01/15/2021  · Instructions  o ****Surgical Orders****  o ***************  o Outpatient  o ***************  o RISK AND BENEFITS:  o Possible risks/complications, benefits and alternatives to surgical or invasive procedure have been explained to the patient and/or legal guardian.  o Patient has been evaluated and can tolerate anesthesia and/or sedation. Risks, benefits, and alternatives to anesthesia and sedation have been explained to the patient and/or legal guardian.  o ***************  o PREP: Per  protocol  o IV: Per Anesthesia  o The above History and Physical Examination has been completed within 30 days of admission.            Electronically Signed by: Azucena Cardoza Other -Author on January 15, 2021 08:25:49 AM

## 2021-05-14 NOTE — PROGRESS NOTES
"   Progress Note      Patient Name: Gabriele Connolly   Patient ID: [id number]   Sex: Male   YOB: 1950    Primary Care Provider: Vasiliy Nunn MD   Referring Provider: Vasiliy Nunn MD    Visit Date: April 12, 2021    Provider: YADI Kelly   Location: [facility]   Location Address: [address] [place] [address]  [id number]   Location Phone: [phone]          Chief Complaint  · F/U EGD  · Dysphagia      History Of Present Illness  Gabriele Connolly is a 70 year old /White male who presents to the office today.      We have not seen patient in the office before, Dr. Frey had contacted us in January for patient's dysphagia related to esophageal cancer.  EGD 1/15/2021: Malignant intrinsic 8 mm stricture 4 cm long seen in the GE junction, metal stent was placed successfully, mass of malignant appearance was found in the cardia.  Patient advised to follow-up as needed    Today, pt has sensation of food getting \"blocked\" lower esophagus, increased belching after liquids, some vomiting - states about 1-2 x week. Eating smaller portions d/t fear of food getting stuck. Has lost about 16# over the last 6 weeks. Pt has had radiation beeds planted in liver, he is having more placed tomorrow at .   Has had some upper abd pain, no change, since Jan. No blood in stool or black stool.       Past Medical History  Arthritis; Heart attack; Heart Disease; Hypercholesterolemia; Hypertension; Peripheral neuropathy; Stroke         Past Surgical History  Ankle surgery; Cardiac Catherization; Colonoscopy; EGD (Endoscopy); Knee replacement, total; Shoulder Replacement, Left; Stent placement into right mainstem bronchus         Medication List  Name Date Started Instructions   allopurinol 300 mg oral tablet  take 1 tablet (300 mg) by oral route once daily   aspirin 81 mg oral tablet,delayed release (DR/EC)  take 1 tablet (81 mg) by oral route once daily   chlorthalidone 25 " "mg oral tablet  take 1 tablet half of (25 mg) by oral route once daily   CoQ-10 30 mg oral capsule  take 1 capsule by oral route daily   fiber oral powder  use as directed by oral route daily   folic acid 1 mg oral tablet  take 1 tablet (1 mg) by oral route once daily   furosemide 40 mg oral tablet  take 1 1/2 tablet (40 mg) by oral route once daily   Glucosamine Chondroitin 550-30-1 mg oral capsule  take 2 capsules by oral route daily   irbesartan 300 mg oral tablet  take 1 tablet (300 mg) by oral route once daily   mag oxide-D3-turmeric rt xt 500-3,000-150 mg-unit-mg oral tablet  take 1 tablet by oral route daily   metoprolol tartrate 25 mg oral tablet  take 1 tablet (25 mg) by oral route 2 times per day   pantoprazole 40 mg oral tablet,delayed release (DR/EC)  take 1 tablet (40 mg) by oral route 2 times per day   potassium chloride 10 mEq oral capsule, extended release  take 1 capsule (10 meq) by oral route 2 times per day with food   pravastatin 80 mg oral tablet  take 1 tablet (80 mg) by oral route once daily   prednisone 5 mg oral tablet  take 1 mg/kg by oral route once daily         Allergy List  NO KNOWN DRUG ALLERGIES         Family Medical History  Hyperlipidemia; Heart Disease; Hypertension; Heart Attack (MI); Uterine Cancer, Family History; Arthrtis; No family history of colorectal cancer         Social History  Alcohol (Current some day); Tobacco (Former)         Review of Systems  · Constitutional  o Admits  o : good general health lately, no acute distress  · Gastrointestinal  o Denies  o : additional gastrointestinal symptoms except as noted in the HPI  · Psychiatric  o Admits  o : pleasant affect      Vitals  Date Time BP Position Site L\R Cuff Size HR RR TEMP (F) WT  HT  BMI kg/m2 BSA m2 O2 Sat FR L/min FiO2        04/12/2021 03:38 /75 Sitting    75 - R  99.3 238lbs 4oz 5'  10\" 34.18 2.31             Physical Examination  · Constitutional  o Appearance  o : well developed, well-nourished, " in no acute distress  · Head and Face  o Head  o :   § Inspection  § : atraumatic, normocephalic  · Eyes  o Sclerae  o : sclerae white, no sclerae icterus  · Neck  o Inspection/Palpation  o : supple  · Respiratory  o Respiratory Effort  o : breathing unlabored  o Inspection of Chest  o : normal appearance, no retractions  · Cardiovascular  o Peripheral Vascular System  o :   § Extremities  § : no cyanosis, clubbing or edema  · Gastrointestinal  o Abdominal Examination  o : soft, nontender to palpation--mild tenderness upper abdomen  · Skin and Subcutaneous Tissue  o General Inspection  o : no lesions present, no rashes present  · Neurologic  o Mental Status Examination  o :   § Orientation  § : grossly oriented to person, place and time  § Speech/Language  § : communication ability within normal limits, voice quality normal, articulation of speech normal, no evidence of aphasia  § Attention  § : attention normal, concentration abilities normal  o Sensation  o : grossly intact  o Gait and Station  o :   § Gait Screening  § : normal gait  · Psychiatric  o General  o : Alert and oriented x3  o Mood and Affect  o : Mood and affect are appropriate to circumstances          Assessment  · Pre-op exam     V72.84/Z01.818  · Esophageal dysphagia     787.20/R13.10  metal stent placed 1/2021  · Esophageal cancer     150.9/C15.9  metastatic to liver      Plan  · Medications  o Medications have been Reconciled  o Transition of Care or Provider Policy  · Instructions  o Please Sign Permit for: EGD  o Indication: esophageal dysphagia r/t esophageal cancer, metal stent placed 1/2021  o Surgical Risk and Benefits: Possible risks/complications, benefits, and alternatives to surgical or invasive procedure have been explained to patient and/or legal guardian; Patient has been evaluated and can tolerate anesthesia and/or sedation. Risks, benefits, and alternatives to anesthesia and sedation have been explained to patient and/or legal  guardian.  o Follow Up after Procedure.  o Encouraged to follow-up with Primary Care Provider for preventative care.  o Patient was educated/instructed on their diagnosis, treatment and medications prior to discharge from the clinic today.  o Patient instructed to seek medical attention urgently for new or worsening symptoms.  o Advised full liquid diet until EGD. Working in this week. Advised pt if unable to swallow liquids or if has chest pain to go to ER.             Electronically Signed by: YADI Kelly -Author on April 12, 2021 04:14:29 PM

## 2021-05-28 NOTE — PROGRESS NOTES
Patient: COLLEEN CONNOLLY     Acct: [id number]     Report: #[id number]  UNIT #: [id number]     : 1950    Encounter Date:2020  PRIMARY CARE: SATISH CASTRO  ***Signed***  --------------------------------------------------------------------------------------------------------------------  NURSE INTAKE      Visit Type      Established Patient Visit            Chief Complaint      ESOPHAGEAL CA HERE FOR TX            Referring Provider/Copies To      Primary Care Provider:  SATISH CASTRO      Copies To:   SATISH CASTRO            History and Present Illness      Past Oncology Illness History      Mr. Connolly is a 69-year-old white male who presents with esophageal cancer.      Patient states that this was initially identified when he was noted to be iron     deficient by his rheumatologist who was doing routine blood work for follow-up     of his methotrexate and rheumatoid arthritis treatment.  He was seen by surgical    oncology at [facility], Dr. Do.  Repeat EGD confirmed lesion     in the lower esophagus/GE junction area.  Biopsies showed marked atypia.      Surgery was considered but patient and family desired a second opinion.  They     were then seen by CT surgery at [facility], Dr. Girard who     repeated the testing and this time biopsy was positive for adenocarcinoma.  He     underwent staging endoscopic ultrasound which identified a lesion in the distal     esophagus, GE junction, cardia of the stomach.  A cardia lymph node was also     positive on FNA giving him N1 disease.  His [place] physicians have     recommended neoadjuvant concurrent chemo RT followed by resection based on the t    rial CROSS trial.  Upon simulation for esophageal radiation-an area was found on    the liver--right lobe of liver.  MRI liver showed 9 mm lesion in the inferior     right hepatic lobe shows features suspicious for a single hepatic metastasis,     particularly given  history of malignancy.20 Cycle 10 day 15 held due to     increased productive cough. 10 days of antibiotics prescribed.            HPI - Oncology Interim      Patient returns today for ongoing treatment of his metastatic esophageal cancer.     He is on second line therapy with Taxol plus Cyramza.  He is due for cycle 11.     He states he is feeling quite well.  Day 15 was held last cycle due to     respiratory infection.  He reports he is completed a course of antibiotics and     his breathing is much better.  Denies any further cough or fever.  He denies any    masses lymphadenopathy.  He is eating drinking well, his weight is maintained.      He notes good energy level.  He denies any issues from his Port-A-Cath.            Cancer Details            Adenocarcinoma of gastroesophageal junction            Metastatic Sites      Liver            Clinical Staging      Stage IV            Treatments      Chemotherapy      3/6/19-17 completed 4C FOLFOX  progression  19-7/3/19 completed 3C     Keytruda   progression  Cyramza/Taxol initiated 19            Clinical Trial Participant      No            ECOG Performance Status      0            Most Recent Lab Findings      Laboratory Tests      20 14:20            Most Recent Imaging Findings      Patient: COLLEEN BRAGG   Acct: #[id number]   Report: #[id number]            UNIT #: [id number]    DOS: 20 1322      INSURANCE:MEDICARE PART A   LOCATION:CT     : 1950            PROVIDERS      ADMITTING:     ATTENDING: DEBBIE TAVAREZ      FAMILY:  SATISH CASTRO   ORDERING:  DEBBIE TAVAREZ         OTHER:    DICTATING:  Harshad Contreras MD, JR            REQ #:[id number]   EXAM:CTACPWC - CT ABD CHEST PEL w CONTR      REASON FOR EXAM:  GASTRIC CA      REASON FOR VISIT:  GASTRIC CA            *******Signed******         PROCEDURE:   CT ABDOMEN; CT CHEST; CT PELVIS WITH CONTRAST             COMPARISONS:   [facility], CT, CT CHEST  ABD PEL W CONTRAST,     11/26/2019, 11:32.  [facility], CT, CT CHEST ABD PEL W CONTRAST, 9/30/2019, 11:34.  [facility],       CT, CT CHEST ABD PEL W CONTRAST, 2/17/2020, 10:55.             INDICATIONS:   H/O ESOPHAGEAL CANCER WITH LIVER METASTASES; IMAGING F/U.             TECHNIQUE:   After obtaining the patient's consent, 1,162 CT images were     obtained with intravenous       contrast material.  Oral contrast agent was administered study.  The oral     contrast preparation is       incomplete.             FINDINGS:                CHEST CT:  Streak artifact is associated with a left shoulder prosthesis, seen     previously.  No       mediastinal mass is seen.  No enlarged mediastinal lymph nodes are identified.      No convincing CT       evidence for esophageal wall thickening.  No pleural effusion.  No pericardial     effusion.  No       cardiac enlargement.  There may be coronary artery calcifications as well as     coronary artery       endovascular stents.  Bilateral gynecomastia is incidentally seen.  The maximum     diameter of the       ascending aorta is 3.8 cm, which is at the upper limits of normal.  Consider     close interval       clinical and imaging follow-up of this finding to exclude a developing fusiform     aneurysm of the       ascending aorta.  No thoracic aortic dissection is suggested.  External     artifacts in the scan field       of view obscure detail on the study, as well.  Chronic calcified granulomatous     disease involves the       chest.  Nonspecific bilateral tree-in-bud (centrilobular) pulmonary opacities     are seen, especially       in the dependent portions of the lungs and in the bilateral lower lobes.  They     exhibit an       airway-centered distribution.  They may have an infectious origin.  Tiny     centrilobular nodules are       possible, as well.  Aspiration pneumonia would be in the differential diagnosis.     The appearance  is       thought less likely to be related to treatment effect or lymphangitic spread of     tumor (i.e.,       lymphangitic carcinomatosis).  There may be some associated peribronchial     thickening with this       finding, especially in the bilateral lower lobes.  No focal lobar infiltrate is     seen.  There is a       left-sided central venous line in place with its tip in the expected location of    the upper superior       vena cava, seen previously.  The contrast bolus in the pulmonary arteries is     limited.  The       technique was not tailored in order to evaluate the pulmonary arteries.             ABDOMEN/PELVIS CT:  There are low densities within the liver, which measure     about 0.9 cm or less in       size.  They measured 1.6 cm or less in size on the prior CT studies from     9/30/2019.  They have       shown favorable progression and have continued to decrease in size and number     since the prior [facility]       CT exams.  Gallstones are seen without acute cholecystitis, as on prior CT     studies.  No biliary       ductal dilatation or choledocholithiasis is seen.  No acute pancreatitis is     identified.  There is       chronic calcified granulomatous disease of the spleen.  No ascites.      Nonobstructing left       nephrolithiasis is seen with a 3 mm lower pole calyceal stone, as on image 83 of    series 604 and       adjacent images.  No definite left renal stones are seen.  No ureterolithiasis.     No hydronephrosis.        No obstructive uropathy.  No acute pyelonephritis is appreciated.  There is     age-indeterminate       bilateral perinephric inflammatory stranding, seen on prior studies.  No adrenal    mass is seen.        There are scattered colonic diverticula without acute diverticulitis.  No acute     colitis.  No       enlarged intra-abdominal or intrapelvic lymph nodes are suggested.  There may be    mild diffuse       prostatomegaly.  There are prostatic calcifications.  Pelvic  phleboliths are     seen.  Small bilateral       inguinal hernias are seen, which contain fat and vessels and no bowel.  No acute    appendicitis.        Again demonstrated is an asymmetric thickened appearance of the left posterior,     lateral and left       inferior aspects of the urinary bladder wall, which measures about 0.7 cm in     greatest thickness.        Previously, it measured 0.9 cm in greatest thickness, as measured by this     .  Again, a       malignant process cannot be excluded.  There are degenerative changes throughout    the imaged spine.        There is bilateral L5 spondylolysis with probably grade 2 spondylolisthesis of     L5 upon S1,       estimated 1.4 cm.  Severe disc and endplate degenerative changes are seen at L5-    S1.  There are       degenerative changes, to a lesser extent, elsewhere.  No definite osseous     metastatic disease is       suggested.  There are degenerative changes of the bilateral hip joints, greater     on the left.  There       is atherosclerotic change of the aortoiliac arterial system without aneurysmal     enlargement.        Consider Nuclear Medicine (NM) whole-body FDG-PET-CT scan and/or NM bone scan     for further imaging       assessment of the findings if clinically warranted and if not contraindicated.             CONCLUSION:   (COMBINED)      1. New bilateral centrilobular pulmonary opacities are seen, predominantly in     the lower lobes and       in the dependent portions of the lungs.  These findings are nonspecific.  They     may represent an       infectious pneumonitis.  Aspiration pneumonia is possible.  The findings are     thought less likely to       represent treatment effect or lymphangitic spread of tumor (i.e., lymphangitic     carcinomatosis).      2. Decrease in size and number of the hepatic metastases is suggested, which     measure about 0.9 cm       or less in size.      3. There is less prominent asymmetric thickening of  the left aspects of the     urinary bladder wall,       which may reflect favorable progression in treatment.  Again, a malignant     process cannot be       excluded.      4. There is nonobstructing right nephrolithiasis.  No definite left     nephrolithiasis.  No       obstructive uropathy or hydronephrosis.  No ureterolithiasis.      5. There are gallstones without acute cholecystitis.      6. There is a left-sided central venous line in place with its tip in the     expected location of the       upper superior vena cava, seen previously.      7. Please see above comments for further detail.             FREDRICK PALENCIA JR, MD             Electronically Signed and Approved By: FREDRICK PALENCIA JR, MD on 5/05/2020 at     3:07                        Until signed, this is an unconfirmed preliminary report that may contain      errors and is subject to change.                                              CARJI:      D:05/05/20 0251            PAST, FAMILY   Past Medical History      Past Medical History:  Arthritis, Heart Attack, Hypertension      Hematology/Oncology (M):  GI Cancer, Oral Cancer, Skin Cancer            Past Surgical History      Biopsy (LIVER), Coronary Stent, Joint Replacement, Skin Cancer Removal            Family History      Family History:  Uterine Cancer            Social History      Marital Status:        Lives independently:  Yes      Number of Children:  3      Occupation:  RETIRED            Tobacco Use      Tobacco status:  Former smoker            Alcohol Use      Alcohol intake:  None            Substance Use      Substance use:  Denies use            REVIEW OF SYSTEMS      General:  Admits: Fatigue      Eye:  Admits Blurred Vision      ENT:  Admits Headache      Cardiovascular:  Admits Chest Pain      Respiratory:  Admits: Cough      Gastrointestinal:  Admits Bloody Stools      Genitourinary:  Admits Blood in Urine      Musculoskeletal:  Admits Back Pain      Integumentary:  Admits  Itching      Neurologic:  Admits Dizziness            VITAL SIGNS AND SCORES      Vitals      Height 5 ft 9.21 in / 175.8 cm      Weight 257 lbs 15.011 oz / 117. kg      BSA 2.31 m2      BMI 37.9 kg/m2      Temperature 98.8 F / 37.11 C - Temporal      Pulse 74      Respirations 20      Blood Pressure 144/77 Sitting      Pulse Oximetry 94%, ROOM AIR            Pain Score      Pain Scale Used:  Numerical      Pain Intensity:  0            Fatigue Score      Fatigue (0-10 scale):  3            EXAM      General Appearance:  Positive for: Alert, Cooperative;          Negative for: Acute Distress      Neck:  Positive for: Supple;          Negative for: JVD, Masses      Respiratory:  Positive for: CTAB, Normal Respiratory Effort      Chest:  Port in Place      Abdomen/Gastro:  Positive for: Normal Active Bowel Sounds, Soft;          Negative for: Distention, Hepatosplenomegaly, Tenderness      Cardiovascular:  Positive for: RRR;          Negative for: Gallop, Murmur, Peripheral Edema, Rub      Psychiatric:  Positive for: Appropriate Affect, Intact Judgement      Lymphatic:  Negative for: Cervical, Infraclavicular, Supraclavicular            PREVENTION      Hx Influenza Vaccination:  Yes      Date Influenza Vaccine Given:  Nov 1, 2019      2 or More Falls Past Year?:  No      Fall Past Year with Injury?:  No      Encouraged to follow-up with:  PCP regarding preventative exams.      Chart initiated by      TRISHA ATKINS            ALLERGY/MEDS      Allergies      Coded Allergies:             NO KNOWN DRUG ALLERGIES (Verified  Allergy, Unknown, 4/8/20)            Medications      Last Reconciled on 5/6/20 12:38 by DEBBIE TAVAREZ      levoFLOXacin (levoFLOXacin) 750 Mg Tablet      750 MG PO QDAY for 10 Days, #10 TAB 0 Refills         Prov: DEBBIE TAVAREZ         4/22/20       Irbesartan (Irbesartan) 300 Mg Tablet      300 MG PO QDAY for 30 Days, #30 TAB         Reported         1/29/20       Potassium Chloride (K-Tab*) 10  Meq Tablet.er      20 MEQ PO QDAY for low potassium for 30 Days, #60 TAB 3 Refills         Prov: DEBBIE TAVAREZ         1/22/20       Pyridoxine (Vitamin B6) 100 Mg Tablet      100 MG PO BID, TAB         Reported         11/13/19       DULoxetine (Cymbalta) 20 Mg Capsule.dr      20 MG PO QDAY for 30 Days, #30 CAP 5 Refills         Prov: DEBBIE TAVAREZ         11/6/19       Methotrexate Sodium (Methotrexate) 2.5 Mg Tab      12.5 MG PO Watson, TAB         Reported         11/6/19       (herbs)   No Conflict Check               Reported         10/2/19       Chlorthalidone (CHLORTHALIDONE) 25 Mg Tablet      12.5 MG PO QDAY for 30 Days, #15 TAB         Reported         10/2/19       Telmisartan (Micardis) 20 Mg Tab      20 MG PO QDAY, TAB         Reported         7/31/19       Allopurinol (Allopurinol) 300 Mg Tablet      300 MG PO QDAY, #30 TAB 0 Refills         Reported         6/12/19       Ubidecarenone (Co Q-10) 1 Each Capsule      100 MG PO QDAY, CAP         Reported         5/15/19       Folic Acid (Folic Acid) 1 Mg Tablet      1 MG PO QDAY, #30 TAB 0 Refills         Reported         5/1/19       Lidocaine/Prilocaine (Emla) 30 Gm Cream..g.      1 APL TOPICAL ONCE for apply to port site w/chemo, #2 GM 6 Refills         Prov: RUBA JUNE         2/26/19       Prochlorperazine Maleate (Prochlorperazine Maleate) 10 Mg Tab      10 MG PO Q6H PRN for NAUSEA AND/OR VOMITING for 30 Days, #120 TAB 3 Refills         Prov: DEBBIE TAVAREZ         2/13/19       Ondansetron Hcl (ONDANSETRON HCL) 4 Mg Tablet      8 MG PO Q8H PRN for NAUSEA AND/OR VOMITING for 30 Days, #180 TAB 3 Refills         Prov: DEBBIE TAVAREZ         2/13/19       Aspirin Chew (Aspirin Baby) 81 Mg Tab.chew      81 MG PO QDAY, #30 TAB.CHEW 0 Refills         Reported         2/13/19       Furosemide* (Lasix*) 40 Mg Tablet      40 MG PO QDAY, #30 TAB 0 Refills         Reported         2/13/19       Metoprolol Tartrate (Metoprolol Tartrate) 25 Mg Tablet      25 MG  PO BID, #60 TAB 0 Refills         Reported         2/13/19       prednisoLONE (prednisoLONE) 5 Mg Tab      5 MG PO QDAY, TAB         Reported         2/13/19       Pantoprazole (Protonix) 20 Mg Tablet      40 MG PO BIDAC, #120 TAB 0 Refills         Reported         2/13/19      Medications Reviewed:  No Changes made to meds            IMPRESSION/PLAN      Diagnosis      Malignant neoplasm of lower third of esophagus - C15.5      Patient is on second line Taxol plus Cyramza.  Tolerating well.  He is due for     cycle 11, day 1.  His lab work is adequate for treatment.  I reviewed his recent     restaging CT scans with the patient.  There is been improvement in the known     metastatic disease, particularly the liver.  I will proceed with cycle 11 as     planned.  I will see him back for cycle 12, day 1 with labs prior.            Patient Education      Patient Education Provided:  Yes            Electronically signed by DEBBIE TAVAREZ  05/06/2020 12:39       Disclaimer: Converted document may not contain table formatting or lab diagrams. Please see [employer] for the authenticated document.

## 2021-05-28 NOTE — PROGRESS NOTES
Patient: COLLEEN CONNOLLY     Acct: [id number]     Report: #[id number]  UNIT #: [id number]     : 1950    Encounter Date:2019  PRIMARY CARE: SATISH CASTRO  ***Signed***  --------------------------------------------------------------------------------------------------------------------  NURSE INTAKE      Visit Type      Established Patient Visit            Chief Complaint      ESOPHAGEAL CA            Referring Provider/Copies To      Provider Not Found in Lookup:  DANA RODRIGUEZ      Primary Care Provider:  SATISH CASTRO            History and Present Illness      Past Oncology Illness History      Mr. Connolly is a 68-year-old white male who presents today for evaluation and     discussion of treatment options for his recently diagnosed esophageal cancer.      Patient states that this was initially identified when he was noted to be iron     deficient by his rheumatologist who was doing routine blood work for follow-up     of his methotrexate and rheumatoid arthritis treatment.  He underwent     colonoscopy which was benign as well as EGD at his local hospital that showed a     lesion in the distal esophagus.  He was seen by surgical oncology at [facility], Dr. Do.  Repeat EGD confirmed lesion in the lower     esophagus/GE junction area.  Biopsies showed marked atypia.  Surgery was     considered but patient and family desired a second opinion.  They were then seen    by CT surgery at [facility], Dr. Girard who repeated the     testing and this time biopsy was positive for adenocarcinoma.  He underwent     staging endoscopic ultrasound which identified a lesion in the distal esophagus,    GE junction, cardia of the stomach.  A cardia lymph node was also positive on     FNA giving him N1 disease.  His [place] physicians have recommended     neoadjuvant concurrent chemo RT followed by resection based on the trial CROSS     trial.  Patient states that he  has had no issues with swallowing, dysphagia,     stomach or chest pain, weight loss, nausea or vomiting.  He continues to take     methotrexate for his rheumatoid arthritis and has considerable difficulties     especially with his hands.  He reports that he has recently completed     intravenous iron therapy.      Upon simulation for esophageal radiation-an area was found on the liver--right     lobe of liver.  MRI liver showed 9 mm lesion in the inferior right hepatic lobe     shows features suspicious for a single hepatic metastasis, particularly given     history of malignancy.       Started on palliative FOLFOX            HPI - Oncology Interim      Patient returns today for consideration of ongoing chemotherapy.  He notes that     his last cycle went well.  He reports a little bit of neuropathy if he touches     something cold but otherwise not causing problems.  He is eating and drinking     without difficulty.  He denies dysphagia, nausea, vomiting or diarrhea.  He     denies any masses or lymphadenopathy.  No unusual aches or pains.  He does have     arthritis which gives him some trouble but no different than previous.  He     reports adequate energy, able to perform all of his normal ADLs.  He denies any     issues with his Port-A-Cath.            Cancer Details            Adenocarcinoma of gastroesophageal junction            Metastatic Sites      Liver            Clinical Staging      Stage IV            Treatments      Chemotherapy      3/6/19 mFOLFOX initiated            Clinical Trial Participant      No            ECOG Performance Status      0            Most Recent Lab Findings      Laboratory Tests      4/17/19 09:50            5/1/19 09:49            Laboratory Tests            Test       4/17/19      09:50             Magnesium Level       1.76 mg/dL      (1.60-2.30)            Most Recent Imaging Findings      Patient: COLLEEN BRAGG   Acct: #[id number]   Report: #[id number]            UNIT  #: [id number]    DOS: 19 1354      INSURANCE:MEDICARE PART A   LOCATION:CT     : 1950            PROVIDERS      ADMITTING:     ATTENDING: RUBA JUNE      FAMILY:  SATISH CASTRO   ORDERING:  RUBA JUNE         OTHER:    DICTATING:  Danny Schofield MD            REQ #:[id number]   EXAM:CTACPWC - CT ABD CHEST PEL w CONTR      REASON FOR EXAM:  ESOPHAGEAL CA      REASON FOR VISIT:  ESOPHAGEAL CA            *******Signed******         PROCEDURE:   CT ABDOMEN; CT CHEST; CT PELVIS WITH CONTRAST             COMPARISON:   [facility], MR, ABDOMEN W/WO CONTRAST, 2019,     12:24.  [facility], CT, CT CHEST ABD PEL W CONTRAST, 2019, 18:02.             INDICATIONS:   ESOPHAGEAL CA WITH LIVER METS             TECHNIQUE:   After obtaining the patient's consent, CT images were obtained with    intravenous contrast       material.      PROTOCOL:     Standard imaging protocol performed                RADIATION:     DLP: 2883mGy*cm          Automated exposure control was utilized to minimize radiation dose.       CONTRAST:   100cc Isovue 370 I.V.      LABS:     eGFR: >60ml/min/1.73m2             FINDINGS:         CT scan of the chest:             There is been no change from the previous study.  There is a small 2 mm plaque-    like nodule abutting       the under surface of the minor fissure which could reflect a small subpleural     lymph node but is       unchanged.  There is minimal scarring in the left lung base.  There is a small     lymph node in the       right axilla with a short axis dimension of 6 mm which is stable from the     previous study.  There is       a precarinal lymph node with a short axis dimension of 9 mm which also is     stable.  Again is       identified a lymph node in the left axilla measuring 2.1 x 1.5 cm in dimension     which is stable.        There are changes of mild bilateral gynecomastia.  There are postoperative     changes of the  left       shoulder.  There is a MediPort with the tip in the superior vena cava.  There     are hypertrophic       degenerative changes of the thoracic spine.             CT scan of the abdomen and pelvis:             There is cholelithiasis without biliary dilatation.  On the previous study was     noted a 9-10 mm       faint low-density mass laterally in the lower portion of the right lobe of the     liver which on the       previous CT scan was seen best on axial image number 46. This nodule is not as     well seen on today's       study and appears smaller measuring only 5 mm.  There has been however since the    previous CT scan       and MRI of the development of additional slightly ill-defined low-density masses    scattered       throughout the left and right lobes of the liver consistent with metastatic     disease.  The largest       in the left lobe measures 1.8 cm in dimension and the largest in the right lobe     measures 1.3 cm in       dimension.  Other additional nodules in the right lobe are also noted.  The     adrenal glands and       spleen and pancreas and kidneys appear unremarkable.  Again is noted mild mural     thickening of the       distal esophagus at the level of the gastroesophageal junction 8 mm in dimension    similar to the       previous study.  There is a lymph node just anterior and inferior to the     gastroesophageal junction       measuring 8 mm in diameter and previously measured 1.1 cm in diameter.  There is    a lymph node 9 mm       in dimension just lateral to the proximal portion of the left common iliac     artery which is stable       from the previous study.  There is a lymph node 1.8 cm in dimension just     anterior to the upper       portion of the right common iliac vein which is also stable in size.  There are     several lymph nodes       along the course of the external iliac arteries bilaterally which are stable.      There is mild       uniform thickening of  the wall of the urinary bladder up to 5 mm which is stable    and may reflect       changes of muscular hypertrophy.  There is a moderate to large amount of stool     in the colon       consistent with constipation.  The appendix appears normal.  There is scattered     small bowel       air-fluid levels in nondistended loops without caliber change.  Bone window     imaging shows       degenerative disc changes at L5-S1 with bilateral L5 pars defects and 1 cm ante    rior       spondylolisthesis of L5 on S1 which is stable.  There are changes of mild left     hip osteoarthritis.             CONCLUSION:                1. No change in the CT scan of the chest from the previous study.  There is no     evidence of       metastatic disease.             2. There are multiple new low-density masses scattered in the liver as described    above suggesting       new metastatic lesions.  The single lesion seen on the previous study does     appear slightly smaller.             3. Slight decrease in size in the lymph node at the gastroesophageal junction.             4. Retroperitoneal and pelvic adenopathy as described above which is stable.  Th    is would be an       unusual presentation for metastatic disease from esophageal carcinoma.      Alternatively, these       enlarged lymph nodes may be post reactive in nature.                SAMUEL WHITTAKER MD             Electronically Signed and Approved By: SAMUEL WHITTAKER MD on 4/29/2019 at 15:59                           Until signed, this is an unconfirmed preliminary report that may contain      errors and is subject to change.                                              SCHJ1:      D:04/29/19 1600            PAST, FAMILY   Past Medical History      Past Medical History:  Arthritis, Heart Attack, Hypertension      Hematology/Oncology (M):  GI Cancer (ESOPHAAGEAL CA), Oral Cancer (ESOPHAGEAL     CA), Skin Cancer            Past Surgical History      Biopsy (X 4), Coronary  Stent, Joint Replacement, Skin Cancer Removal            Family History      Family History:  Uterine Cancer (MOTHER)            Social History      Marital Status:        Lives independently:  Yes      Number of Children:  3      Occupation:  RETIRED            Tobacco Use      Tobacco status:  Former smoker            Alcohol Use      Alcohol intake:  None            Substance Use      Substance use:  Denies use            REVIEW OF SYSTEMS      General:  Admits: Fatigue;          Denies: Appetite Change, Fever, Night Sweats, Weight Gain, Weight Loss      Eye:  Denies: Blurred Vision, Corrective Lenses, Diplopia, Eye Irritation, Eye     Pain, Eye Redness, Spots in Vision, Vision Loss      ENT:  Denies: Headache, Hearing Loss, Hoarseness, Seizures, Sinus Congestion,     Sore Throat      Cardiovascular:  Denies: Chest Pain, Edema Ankles, Edema Legs, Irregular     Heartbeat, Palpitations      Respiratory:  Denies: Coughing Blood, Productive Cough, Shortness of Air,     Wheezing      Gastrointestinal:  Denies: Bloody Stools, Constipation, Diarrhea, Frequent     Heartburn, Nausea, Problem Swallowing, Tarry Stools, Unable to Control Bowels,     Vomiting      Genitourinary (male):  Denies: Blood in Urine, Decrease Urine Stream, Frequent     Urination, Incontinence, Painful Urination      Musculoskeletal:  Denies: Back Pain, Leg Cramps, Muscle Pain, Muscle Weakness,     Painful Joints, Swollen Joints      Integumentary:  Denies: Bleeds Easily, Bruises Easily, Hair Changes, Jaundice,     Lesions, Mole Changes, Nail Changes, Pigment Changes, Rash, Skin Discoloration      Neurologic:  Denies: Dizziness, Fainting, Numbness\Tingling, Paralysis, Seizures      Psychiatric:  Denies: Anxiety, Confused, Depression, Disoriented, Memory Loss      Endocrine:  Denies: Cold Intolerance, Diabetes, Excessive Sweating, Excessive     Thirst, Excessive Urination, Heat Intolerance, Flushing, Hyperthyroidism,     Hypothyroidism       Hematologic/Lymphatic:  Denies: Bruising, Bleeding, Enlarged Lymph Nodes, Rec    urrent Infections, Transfusions            VITAL SIGNS AND SCORES      Vitals      Height 5 ft 9 in / 175.26 cm      Weight 263 lbs 14.250 oz / 119.7 kg      BSA 2.32 m2      BMI 39.0 kg/m2      Temperature 98 F / 36.67 C - Temporal      Pulse 73      Respirations 20      Blood Pressure 148/81 Sitting      Pulse Oximetry 98%, RM AIR            Pain Score      Pain Scale Used:  Numerical      Pain Intensity:  4            Fatigue Score      Fatigue (0-10 scale):  5            EXAM      General Appearance:  Positive for: Alert, Oriented x3, Cooperative;          Negative for: Acute Distress      Neck:  Positive for: Supple;          Negative for: JVD, Masses      Respiratory:  Positive for: CTAB, Normal Respiratory Effort      Chest:  Port in Place      Abdomen/Gastro:  Positive for: Normal Active Bowel Sounds, Soft;          Negative for: Distention, Hepatosplenomegaly, Tenderness      Cardiovascular:  Positive for: RRR;          Negative for: Gallop, Murmur, Peripheral Edema, Rub      Psychiatric:  Positive for: Appropriate Affect, Intact Judgement      Other      Synovitis of the metacarpophalangeal joints on both hands      Upper Extremities:  Negative for: Clubbing, Digital Cyanosis, Digital Ischemia      Lymphatic:  Negative for: Axillary, Cervical, Infraclavicular, Supraclavicular            PREVENTION      Hx Influenza Vaccination:  Yes      Date Influenza Vaccine Given:  Dec 1, 2018      2 or More Falls Past Year?:  No      Fall Past Year with Injury?:  No      Encouraged to follow-up with:  PCP regarding preventative exams.      Chart initiated by      DIANA COSME MA            ALLERGY/MEDS      Allergies      Coded Allergies:             NO KNOWN DRUG ALLERGIES (Verified  Allergy, Unknown, 5/1/19)            Medications      Last Reconciled on 4/17/19 13:13 by YADI JC      Folic Acid (Folic Acid*) 1 Mg  Tablet      1 MG PO QDAY, #30 TAB 0 Refills         Reported         5/1/19       Ferrous Sulfate (Iron Sulfate*) 325 Mg Tablet      325 MG PO BID, #60 TAB 0 Refills         Reported         5/1/19       amLODIPine (amLODIPine) 2.5 Mg Tablet      2.5 MG PO QDAY, #30 TAB 0 Refills         Reported         5/1/19       Lidocaine/Prilocaine (Emla) 30 Gm Cream..g.      1 APL TOPICAL ONCE for apply to port site w/chemo, #2 GM 6 Refills         Prov: RUBA JUNE         2/26/19       Prochlorperazine Maleate (Prochlorperazine Maleate) 10 Mg Tab      10 MG PO Q6H PRN for NAUSEA AND/OR VOMITING for 30 Days, #120 TAB 3 Refills         Prov: DEBBIE TAVAREZ         2/13/19       Ondansetron Hcl (ONDANSETRON HCL) 4 Mg Tablet      8 MG PO Q8H PRN for NAUSEA AND/OR VOMITING for 30 Days, #180 TAB 3 Refills         Prov: DEBBIE TAVAREZ         2/13/19       Aspirin Chew (Aspirin Baby) 81 Mg Tab.chew      81 MG PO QDAY, #30 TAB.CHEW 0 Refills         Reported         2/13/19       Allopurinol (Zyloprim*) 100 Mg Tablet      100 MG PO BID for 30 Days, #60 TAB 0 Refills         Reported         2/13/19       Furosemide* (Lasix*) 40 Mg Tablet      40 MG PO QDAY, #30 TAB 0 Refills         Reported         2/13/19       Pravastatin Sod (Pravastatin*) 40 Mg Tablet      80 MG PO QDAY, #60 TAB 0 Refills         Reported         2/13/19       Metoprolol Tartrate (Metoprolol Tartrate*) 25 Mg Tablet      25 MG PO BID, #60 TAB 0 Refills         Reported         2/13/19       Prednisolone* (Prednisolone*) 5 Mg Tab      5 MG PO QDAY, TAB         Reported         2/13/19       Telmisartan (Micardis*) 40 Mg Tablet      80 MG PO QDAY, TAB         Reported         2/13/19       Pantoprazole (Protonix*) 20 Mg Tablet      40 MG PO BIDAC, #120 TAB 0 Refills         Reported         2/13/19      Medications Reviewed:  No Changes made to meds            IMPRESSION/PLAN      Impression      Adenocarcinoma of the GE junction.  New liver lesions.             Diagnosis      Liver lesion, left lobe - K76.9            Esophageal cancer         Malignant neoplasm of lower third of esophagus - C15.5         Malignant neoplasm of esophagus location: lower third            Notes      Patient with metastatic adenocarcinoma of the GE junction.  On treatment with     FOLFOX.  Unfortunately restaging scans demonstrate multiple new lesions in the     liver concerning for progressive disease.  I would recommend biopsy of the most     amenable lesion to which he and his wife are agreeable.  Based on prior next     generation sequencing, he is PDL 1+ and I would consider second line therapy     with Keytruda if progressive disease is histologically proven.  I will see him     back shortly after biopsy to review and finalize treatment planning.      New Diagnostics      * BIOPSY CT, SCHEDULED PROCEDURE         Dx: Liver lesion, left lobe - K76.9      * PT/PTT, Routine         Dx: Liver lesion, left lobe - K76.9            Patient Education      Patient Education Provided:  Yes                 Disclaimer: Converted document may not contain table formatting or lab diagrams. Please see [employer] for the authenticated document.

## 2021-05-28 NOTE — PROGRESS NOTES
Patient: COLLEEN CONNOLLY     Acct: [id number]     Report: #[id number]  UNIT #: [id number]     : 1950    Encounter Date:2020  PRIMARY CARE: SATISH CASTRO  ***Signed***  --------------------------------------------------------------------------------------------------------------------  NURSE INTAKE      Visit Type      Established Patient Visit            Chief Complaint      ESOPHAGEAL CA      Intent of Therapy:  Palliative            History and Present Illness      Past Oncology Illness History      Mr. Connolly is a 69-year-old white male who presents with esophageal cancer.      Patient states that this was initially identified when he was noted to be iron     deficient by his rheumatologist who was doing routine blood work for follow-up     of his methotrexate and rheumatoid arthritis treatment.  He was seen by surgical    oncology at [facility], Dr. Do.  Repeat EGD confirmed lesion     in the lower esophagus/GE junction area.  Biopsies showed marked atypia.      Surgery was considered but patient and family desired a second opinion.  They     were then seen by CT surgery at [facility], Dr. Girard who     repeated the testing and this time biopsy was positive for adenocarcinoma.  He     underwent staging endoscopic ultrasound which identified a lesion in the distal     esophagus, GE junction, cardia of the stomach.  A cardia lymph node was also     positive on FNA giving him N1 disease.  His [place] physicians have     recommended neoadjuvant concurrent chemo RT followed by resection based on the     trial CROSS trial.  Upon simulation for esophageal radiation-an area was found     on the liver--right lobe of liver.  MRI liver showed 9 mm lesion in the inferior    right hepatic lobe shows features suspicious for a single hepatic metastasis,     particularly given history of malignancy.20 Cycle 10 day 15 held due to     increased productive cough. 10  days of antibiotics prescribed.  8/26/20     progression stopped Cyramza/Taxol started Irinotecan.            HPI - Oncology Interim      Pt returns for cycle 2 of single agent irinotecan. He states that he had trouble    with the first cycle, namely nausea and diarrhea. He notes multiple loose bowel     movements. Imodium did help. He was taking about 6/day. He notes zofran     controlled his nausea. He denies issues with the port. He notes good energy and     appetite. He denies new masses.            Cancer Details            Adenocarcinoma of gastroesophageal junction            Clinical Staging      Stage IV            Treatments      Chemotherapy      3/6/19-4/17/17 completed 4C FOLFOX  progression  5/22/19-7/3/19 completed 3C     Keytruda   progression  Cyramza/Taxol initiated 8/7/19 8/26/20 progression     stopped Cyramza/Taxol started Irinotecan.            Clinical Trial Participant      No            ECOG Performance Status      0            Most Recent Lab Findings            Item Value  Date Time             White Blood Count 15.16 10*3/uL H 8/12/20 0852             Red Blood Count 4.02 10*6/uL L 8/12/20 0852             Hemoglobin 11.6 g/dL L 8/12/20 0852             Hematocrit 39.3 % L 8/12/20 0852             Mean Corpuscular Volume 97.8 fL H 8/12/20 0852             Mean Corpuscular Hemoglobin 28.9 pg 8/12/20 0852             Red Cell Distribution Width 18.9 % H 8/12/20 0852             Mean Corpuscular Hemoglobin Concent 29.5 L 8/12/20 0852             RDW Standard Deviation 68.5 fL 8/12/20 0852             Platelet Count 254 10*3/uL 8/12/20 0852             Mean Platelet Volume 9.7 fL 8/12/20 0852             Granulocytes (%) 75.9 % 8/12/20 0852             Sodium Level 137 mmol/L 8/12/20 0846             Potassium Level 4.1 mmol/L 8/12/20 0846             Chloride Level 101 mmol/L 8/12/20 0846             Carbon Dioxide Level 28 mmol/L 8/12/20 0846             Anion Gap 12 mmol/L 8/12/20 0846              Blood Urea Nitrogen 24 mg/dL 8/12/20 0846             Creatinine 0.91 mg/dL 8/12/20 0846             Glomerular Filtration Rate Calc >60 mL/min/{1.73_m2} 8/12/20 0846             BUN/Creatinine Ratio 26 {ratio} H 8/12/20 0846             Glucose Level 94 mg/dL 8/12/20 0846             Thyroid Stimulating Hormone (TSH) 5.210 m(iU)/L H 7/29/20 0848             Free Thyroxine 1.2 ng/dL 7/29/20 0848             White Blood Count 5.24 10*3/uL 9/16/20 0953             Red Blood Count 3.79 10*6/uL L 9/16/20 0953             Hemoglobin 10.6 g/dL L 9/16/20 0953             Hematocrit 34.9 % L 9/16/20 0953             Mean Corpuscular Volume 92.1 fL 9/16/20 0953             Mean Corpuscular Hemoglobin 28.0 pg 9/16/20 0953             Mean Corpuscular Hemoglobin Concent 30.4 L 9/16/20 0953             Red Cell Distribution Width 19.4 % H 9/16/20 0953             RDW Standard Deviation 63.7 fL 9/16/20 0953             Platelet Count 193 10*3/uL 9/16/20 0953             Mean Platelet Volume 9.9 fL 9/16/20 0953             Granulocytes (%) 67.0 % 9/16/20 0953             Potassium Level 4.4 mmol/L 9/16/20 0955             Sodium Level 142 mmol/L 9/16/20 0955             Chloride Level 111 mmol/L 9/16/20 0955             Carbon Dioxide Level 23 mmol/L 9/16/20 0955             Anion Gap 12 mmol/L 9/16/20 0955             Blood Urea Nitrogen 48 mg/dL H 9/16/20 0955             Creatinine 1.27 mg/dL H 9/16/20 0955             Glomerular Filtration Rate Calc 58 mL/min/{1.73_m2} L 9/16/20 0955             BUN/Creatinine Ratio 38 {ratio} H 9/16/20 0955             Glucose Level 103 mg/dL H 9/16/20 0955             Calculated Osmolality 307 H 9/16/20 0955             Calcium Level 9.6 mg/dL 9/16/20 0955             Magnesium Level 1.26 mg/dL L 9/16/20 0955            PAST, FAMILY   Past Medical History      Past Medical History:  Arthritis, Heart Attack, Hypertension      Hematology/Oncology (M):  GI Cancer, Oral Cancer, Skin  Cancer            Past Surgical History      Biopsy, Coronary Stent, Joint Replacement, Skin Cancer Removal            Family History      Family History:  Uterine Cancer            Social History      Lives independently:  Yes      Number of Children:  3      Occupation:  RETIRED            Tobacco Use      Tobacco status:  Former smoker            Substance Use      Substance use:  Denies use            REVIEW OF SYSTEMS      General:  Admits: Fatigue;          Denies: Fever      Eye:  Admits Corrective Lenses      ENT:  Admits Hearing Loss      Respiratory:  Denies: Cough      Genitourinary:  Denies Blood in Urine      Musculoskeletal:  Admits Muscle Pain, Admits Painful Joints      Neurologic:  Denies Dizziness, Denies Numbness\Tingling            VITAL SIGNS AND SCORES      Vitals      Weight 250 lbs 14.136 oz / 113.8 kg      Temperature 98.1 F / 36.72 C - Temporal      Pulse 68      Respirations 18      Blood Pressure 115/64 Sitting, Left Arm      Pulse Oximetry 95%, RM AIR            Pain Score      Pain Scale Used:  Numerical            Fatigue Score      Fatigue (0-10 scale):  0 (none)            EXAM      General Appearance:  Positive for: Alert, Cooperative;          Negative for: Acute Distress      Eye:  Positive for: Anicteric Sclerae, Moist Conjunctiva      Neck:  Positive for: Supple;          Negative for: JVD, Masses      Respiratory:  Positive for: CTAB, Normal Respiratory Effort      Chest:  Port in Place      Abdomen/Gastro:  Positive for: Normal Active Bowel Sounds, Soft;          Negative for: Distention, Hepatosplenomegaly, Tenderness      Cardiovascular:  Positive for: Peripheral Edema (2+ bilateral), RRR;          Negative for: Gallop, Murmur, Rub      Psychiatric:  Positive for: Appropriate Affect, Intact Judgement      Lymphatic:  Negative for: Cervical, Infraclavicular, Supraclavicular            PREVENTION      Date Influenza Vaccine Given:  Nov 1, 2019      2 or More Falls in Past  Year?:  No      Fall Past Year with Injury?:  No      Encouraged to follow-up with:  PCP regarding preventative exams.      Chart initiated by      DIANA COSME MA            ALLERGY/MEDS      Allergies      Coded Allergies:             NO KNOWN DRUG ALLERGIES (Verified  Allergy, Unknown, 9/16/20)            Medications      Last Reconciled on 9/17/20 12:31 by DEBBIE TAVAREZ      Diphenoxylate/Atropine (Diphenoxylate/Atropine) 1 Each Tablet      2.5 MG PO QID for diarrhea, #60 TAB 0 Refills         Prov: DEBBIE TAVAREZ         9/17/20       Magnesium Oxide (Magnesium Oxide*) 400 Mg Tablet      400 MG PO QDAY for 30 Days, #30 TAB 4 Refills         Prov: DEBBIE TAAVREZ         9/16/20       Prochlorperazine Maleate (Prochlorperazine Maleate) 10 Mg Tab      10 MG PO Q6H PRN for NAUSEA AND/OR VOMITING for 30 Days, #120 TAB 3 Refills         Prov: DEBBIE TAVAREZ         9/10/20       Potassium Chloride (K-Tab*) 10 Meq Tablet.er      20 MEQ PO QDAY for low potassium for 30 Days, #60 TAB 3 Refills         Prov: DEBBIE TAVAREZ         8/12/20       Levothyroxine (Synthroid) 0.025 Mg      0.0125 MG PO QDAY@07, #15 TAB 0 Refills         Reported         8/12/20       Mouthwash (Magic Mouthwash) 1 Each Bottle      15 ML PO Q4H PRN for MOUTH DISCOMFORT, #300 ML 1 Refill         Prov: DEBBIE TAVAREZ         5/20/20       Irbesartan (Irbesartan) 300 Mg Tablet      300 MG PO QDAY for 30 Days, #30 TAB         Reported         1/29/20       Pyridoxine (Vitamin B6) 100 Mg Tablet      100 MG PO BID, TAB         Reported         11/13/19       DULoxetine (Cymbalta) 20 Mg Capsule.dr      20 MG PO QDAY for 30 Days, #30 CAP 5 Refills         Prov: DEBBIE TAVAREZ         11/6/19       Methotrexate Sodium (Methotrexate) 2.5 Mg Tab      12.5 MG PO Watson, TAB         Reported         11/6/19       (herbs)   No Conflict Check               Reported         10/2/19       Chlorthalidone (CHLORTHALIDONE) 25 Mg Tablet      12.5 MG PO QDAY for 30 Days,  #15 TAB         Reported         10/2/19       Telmisartan (Micardis) 20 Mg Tab      20 MG PO QDAY, TAB         Reported         7/31/19       Allopurinol (Allopurinol) 300 Mg Tablet      300 MG PO QDAY, #30 TAB 0 Refills         Reported         6/12/19       Ubidecarenone (Co Q-10) 1 Each Capsule      100 MG PO QDAY, CAP         Reported         5/15/19       Folic Acid (Folic Acid) 1 Mg Tablet      1 MG PO QDAY, #30 TAB 0 Refills         Reported         5/1/19       Lidocaine/Prilocaine (Emla) 30 Gm Cream..g.      1 APL TOPICAL ONCE for apply to port site w/chemo, #2 GM 6 Refills         Prov: RUBA JUNE         2/26/19       Ondansetron Hcl (ONDANSETRON HCL) 4 Mg Tablet      8 MG PO Q8H PRN for NAUSEA AND/OR VOMITING for 30 Days, #180 TAB 3 Refills         Prov: CORBYDEBBIE         2/13/19       Aspirin Chew (Aspirin Baby) 81 Mg Tab.chew      81 MG PO QDAY, #30 TAB.CHEW 0 Refills         Reported         2/13/19       Furosemide* (Lasix*) 40 Mg Tablet      60 MG PO QDAY, #30 TAB 0 Refills         Reported         2/13/19       Metoprolol Tartrate (Metoprolol Tartrate) 25 Mg Tablet      25 MG PO BID, #60 TAB 0 Refills         Reported         2/13/19       prednisoLONE (prednisoLONE) 5 Mg Tab      5 MG PO QDAY, TAB         Reported         2/13/19       Pantoprazole (Protonix) 20 Mg Tablet      40 MG PO BIDAC, #120 TAB 0 Refills         Reported         2/13/19      Medications Reviewed:  No Changes made to meds            IMPRESSION/PLAN      Diagnosis      Malignant neoplasm of lower third of esophagus - C15.5            Metastasis from esophageal cancer - C79.9, C15.9            Notes      Pt is on palliative treatment with irinotecan. He had some trouble with cycle 1.     I will increase his atropine premedication for the diarrhea. I will also give     him a script for lomotil as needed which he can alterate with imodium as needed.     Cont Zofran for N/V. Labs adequate for treatment. proceed with  cycle 2 as     planned.       New Medications      * MAGNESIUM OXIDE (Magnesium Oxide*) 400 MG TABLET: 400 MG PO QDAY 30 Days #30      * Diphenoxylate/Atropine 1 EACH TABLET: 2.5 MG PO QID diarrhea #60         Instructions: FOR DIARRHEA            Patient Education            Coping With Diarrhea Related to Chemotherapy      Coping with Nausea and Vomiting From Chemotherapy      Patient Education Provided:  Yes            Electronically signed by DEBBIE TAVAREZ  09/17/2020 12:31       Disclaimer: Converted document may not contain table formatting or lab diagrams. Please see [employer] for the authenticated document.

## 2021-05-28 NOTE — PROGRESS NOTES
Patient: COLLEEN CONNOLLY     Acct: [id number]     Report: #[id number]  UNIT #: [id number]     : 1950    Encounter Date:2019  PRIMARY CARE: SATISH CASTRO  ***Signed***  --------------------------------------------------------------------------------------------------------------------  NURSE INTAKE      Visit Type      Established Patient Visit            Chief Complaint      METASTATIC ESOPHAGEAL CA HERE FOR TX      Intent of Therapy:  Palliative            Referring Provider/Copies To      Provider Not Found in Lookup:  DANA RODRIGUEZ      Primary Care Provider:  SATISH CASTRO            History and Present Illness      Past Oncology Illness History      Mr. Connolly is a 68-year-old white male who presents today for evaluation and     discussion of treatment options for his recently diagnosed esophageal cancer.      Patient states that this was initially identified when he was noted to be iron     deficient by his rheumatologist who was doing routine blood work for follow-up     of his methotrexate and rheumatoid arthritis treatment.  He underwent     colonoscopy which was benign as well as EGD at his local hospital that showed a     lesion in the distal esophagus.  He was seen by surgical oncology at [facility], Dr. Do.  Repeat EGD confirmed lesion in the lower     esophagus/GE junction area.  Biopsies showed marked atypia.  Surgery was     considered but patient and family desired a second opinion.  They were then seen    by CT surgery at [facility], Dr. Girard who repeated the     testing and this time biopsy was positive for adenocarcinoma.  He underwent     staging endoscopic ultrasound which identified a lesion in the distal esophagus,    GE junction, cardia of the stomach.  A cardia lymph node was also positive on     FNA giving him N1 disease.  His [place] physicians have recommended     neoadjuvant concurrent chemo RT followed by resection  based on the trial CROSS     trial.  Patient states that he has had no issues with swallowing, dysphagia,     stomach or chest pain, weight loss, nausea or vomiting.  He continues to take     methotrexate for his rheumatoid arthritis and has considerable difficulties     especially with his hands.  He reports that he has recently completed     intravenous iron therapy.      Upon simulation for esophageal radiation-an area was found on the liver--right     lobe of liver.  MRI liver showed 9 mm lesion in the inferior right hepatic lobe     shows features suspicious for a single hepatic metastasis, particularly given     history of malignancy.       Started on palliative FOLFOX            HPI - Oncology Interim      F/u met esophageal ca--due for C1d8 Taxol today.  He reports tolerating initial     treatment well; actually felt like he could dance in parking lot when he was     leaving.  However by following day or so began to have aching and it lasted thru    weekend.  He is utilizing OTC Tylenol for pain.  BLE with edema noted and haleigh     coloring; no skin breakdown noted.  He is taking Lasix.  Appetite is good; wt     stable.  Denies dysphagia at this time.  Pt reports bowel/bladder working w/o     issues.  No distress noted.            Cancer Details            Adenocarcinoma of gastroesophageal junction            Metastatic Sites      Liver            Clinical Staging      Stage IV            Treatments      Chemotherapy      3/6/19-4/17/17 completed 4C FOLFOX  progression  5/22/19-7/3/19 completed 3C     Keytruda   progression  Cyramza/Taxol initiated 8/7/19            Clinical Trial Participant      No            ECOG Performance Status      0            Most Recent Lab Findings      Laboratory Tests      8/7/19 08:50            8/14/19 10:41            PAST, FAMILY   Past Medical History      Past Medical History:  Arthritis, Heart Attack, Hypertension      Hematology/Oncology (M):  GI Cancer, Oral Cancer,  Skin Cancer            Past Surgical History      Biopsy (LIVER), Coronary Stent, Joint Replacement, Skin Cancer Removal            Family History      Family History:  Uterine Cancer            Social History      Marital Status:        Lives independently:  Yes      Number of Children:  3      Occupation:  RETIRED            Tobacco Use      Tobacco status:  Former smoker            Alcohol Use      Alcohol intake:  None            Substance Use      Substance use:  Denies use            REVIEW OF SYSTEMS      Eye:  Denies: Blurred Vision      ENT:  Denies: Headache      Cardiovascular:  Admits: Edema Ankles (BILATERAL EDEMA AND DISCOLORATION TO     FEET)      Respiratory:  Denies: Productive Cough      Gastrointestinal:  Denies: Diarrhea      Genitourinary (male):  Denies: Decrease Urine Stream      Musculoskeletal:  Denies: Leg Cramps      Integumentary:  Denies: Jaundice      Neurologic:  Denies: Paralysis            VITAL SIGNS AND SCORES      Vitals      Height 5 ft 8.98 in / 175.2 cm      Weight 251 lbs 8.718 oz / 114.1 kg      BSA 2.28 m2      BMI 37.2 kg/m2      Temperature 97.8 F / 36.56 C - Temporal      Pulse 67      Respirations 20      Blood Pressure 128/80 Sitting      Pulse Oximetry 100%, ROOM AIR            Pain Score      Pain Scale Used:  Numerical (JOINT PAIN)      Pain Intensity:  3            Fatigue Score      Fatigue (0-10 scale):  3            EXAM      General Appearance:  Positive for: Alert, Oriented x3, Cooperative;          Negative for: Acute Distress      Neck:  Positive for: Supple;          Negative for: JVD, Masses      Respiratory:  Positive for: CTAB, Normal Respiratory Effort      Chest:  Port in Place      Abdomen/Gastro:  Positive for: Normal Active Bowel Sounds, Soft;          Negative for: Distention, Hepatosplenomegaly, Tenderness      Cardiovascular:  Positive for: Peripheral Edema (2+ bilateral), RRR;          Negative for: Gallop, Murmur, Rub      Psychiatric:   Positive for: Appropriate Affect, Intact Judgement      Other      Synovitis of the metacarpophalangeal joints and  PIPs      Lower Extremities:  Positive for: Digital Cyanosis (Chronic venous stasis     changes to bilateral lower extremities)      Lymphatic:  Negative for: Cervical, Infraclavicular, Supraclavicular            PREVENTION      Hx Influenza Vaccination:  Yes      Date Influenza Vaccine Given:  Dec 1, 2018      2 or More Falls Past Year?:  No      Fall Past Year with Injury?:  No      Encouraged to follow-up with:  PCP regarding preventative exams.      Chart initiated by      TRISHA ATKINS            ALLERGY/MEDS      Allergies      Coded Allergies:             NO KNOWN DRUG ALLERGIES (Verified  Allergy, Unknown, 8/7/19)            Medications      Last Reconciled on 8/14/19 12:33 by YADI JC      PACLitaxel (taxOL) 6 Mg/1 Ml Vial      0 IV D 1, 8      Reported         7/31/19       (Cyramza)   No Conflict Check      IV D 1      Reported         7/31/19       Telmisartan (Micardis) 20 Mg Tab      20 MG PO QDAY, TAB         Reported         7/31/19       Methotrexate Sodium (Methotrexate) 2.5 Mg Tab      18 MG PO Watson, TAB         Reported         7/31/19       Allopurinol (Allopurinol) 300 Mg Tablet      300 MG PO QDAY, #30 TAB 0 Refills         Reported         6/12/19       Ubidecarenone (Co Q-10) 1 Each Capsule      100 MG PO QDAY, CAP         Reported         5/15/19       Folic Acid (Folic Acid*) 1 Mg Tablet      1 MG PO QDAY, #30 TAB 0 Refills         Reported         5/1/19       amLODIPine (amLODIPine) 2.5 Mg Tablet      2.5 MG PO QDAY, #30 TAB 0 Refills         Reported         5/1/19       Lidocaine/Prilocaine (Emla) 30 Gm Cream..g.      1 APL TOPICAL ONCE for apply to port site w/chemo, #2 GM 6 Refills         Prov: RUBA JUNE         2/26/19       Prochlorperazine Maleate (Prochlorperazine Maleate) 10 Mg Tab      10 MG PO Q6H PRN for NAUSEA AND/OR VOMITING for 30 Days,  #120 TAB 3 Refills         Prov: DEBBIE TAVAREZ         2/13/19       Ondansetron Hcl (ONDANSETRON HCL) 4 Mg Tablet      8 MG PO Q8H PRN for NAUSEA AND/OR VOMITING for 30 Days, #180 TAB 3 Refills         Prov: DEBBIE TAVAREZ         2/13/19       Aspirin Chew (Aspirin Baby) 81 Mg Tab.chew      81 MG PO QDAY, #30 TAB.CHEW 0 Refills         Reported         2/13/19       Furosemide* (Lasix*) 40 Mg Tablet      40 MG PO QDAY, #30 TAB 0 Refills         Reported         2/13/19       Pravastatin Sod (Pravastatin*) 40 Mg Tablet      80 MG PO QDAY, #60 TAB 0 Refills         Reported         2/13/19       Metoprolol Tartrate (Metoprolol Tartrate) 25 Mg Tablet      25 MG PO BID, #60 TAB 0 Refills         Reported         2/13/19       Prednisolone* (Prednisolone*) 5 Mg Tab      5 MG PO QDAY, TAB         Reported         2/13/19       Pantoprazole (Protonix*) 20 Mg Tablet      40 MG PO BIDAC, #120 TAB 0 Refills         Reported         2/13/19      Medications Reviewed:  No Changes made to meds            IMPRESSION/PLAN      Diagnosis      Metastasis from esophageal cancer - C79.9, C15.9            Notes      Patient recently started on third line palliative treatment with Cyramza and     Taxol.  He tolerated his initial cycle day 1 with minimal problems.  Some     fatigue and aching likely related to the Taxol.  We discussed continuing long-    acting Tylenol which can be alternated with over-the-counter Motrin every 4-6     hours.  Lab work is adequate for treatment today.  Proceed with cycle 1, day 8.      We discussed DARRION hose for his lower extremity swelling and venous     insufficiency.  I will see him back for cycle 2, day 1 with labs prior.  Plan     restaging scans after cycle 2.            Patient Education            Chronic Venous Insufficiency      How to Wear DARRION Hose      Patient Education Provided:  Yes                 Disclaimer: Converted document may not contain table formatting or lab diagrams. Please see  [employer] for the authenticated document.

## 2021-05-28 NOTE — PROGRESS NOTES
Patient: COLLEEN CONNOLLY     Acct: [id number]     Report: #[id number]  UNIT #: [id number]     : 1950    Encounter Date:2019  PRIMARY CARE: SATISH CASTRO  ***Signed***  --------------------------------------------------------------------------------------------------------------------  NURSE INTAKE      Visit Type      Established Patient Visit            Chief Complaint      ESOPHAAGEAAL CA HERE FOR TREAATMENT.            Referring Provider/Copies To      Provider Not Found in Lookup:  DANA RODRIGUEZ      Primary Care Provider:  SATISH CASTRO            History and Present Illness      Past Oncology Illness History      Mr. Connolly is a 68-year-old white male who presents today for evaluation and di    scussion of treatment options for his recently diagnosed esophageal cancer.      Patient states that this was initially identified when he was noted to be iron     deficient by his rheumatologist who was doing routine blood work for follow-up     of his methotrexate and rheumatoid arthritis treatment.  He underwent     colonoscopy which was benign as well as EGD at his local hospital that showed a     lesion in the distal esophagus.  He was seen by surgical oncology at [facility], Dr. Do.  Repeat EGD confirmed lesion in the lower     esophagus/GE junction area.  Biopsies showed marked atypia.  Surgery was     considered but patient and family desired a second opinion.  They were then seen    by CT surgery at [facility], Dr. Girard who repeated the     testing and this time biopsy was positive for adenocarcinoma.  He underwent     staging endoscopic ultrasound which identified a lesion in the distal esophagus,    GE junction, cardia of the stomach.  A cardia lymph node was also positive on FN    A giving him N1 disease.  His [place] physicians have recommended neoadjuvant    concurrent chemo RT followed by resection based on the trial CROSS trial.       Patient states that he has had no issues with swallowing, dysphagia, stomach or     chest pain, weight loss, nausea or vomiting.  He continues to take methotrexate     for his rheumatoid arthritis and has considerable difficulties especially with     his hands.  He reports that he has recently completed intravenous iron therapy.      Upon simulation for esophageal radiation-an area was found on the liver--right     lobe of liver.  MRI liver showed 9 mm lesion in the inferior right hepatic lobe     shows features suspicious for a single hepatic metastasis, particularly given     history of malignancy.       Started on palliative FOLFOX            HPI - Oncology Interim      Patient presents today for consideration of ongoing treatment for his metastatic    esophageal cancer.  He is on the FOLFOX regimen.  He is completed 2 cycles.  He     states that he tolerated chemotherapy very well.  He had some mild nausea for 1     day that was easily controlled with his oral antiemetics.  He is eating and     drinking adequately, his weight is actually up a little bit.  He reports good     energy level.  He denies mucositis, diarrhea or rash.  He reports a little     neuropathy when he touches cold objects that lasted for a couple of days after     his chemo but has now resolved.  It does not interfere with normal activities.      He denies any issues from his Port-A-Cath.            Cancer Details            Adenocarcinoma of gastroesophageal junction            Metastatic Sites      Liver            Clinical Staging      Stage IV            Treatments      Chemotherapy      3/6/19 mFOLFOX initiated            Clinical Trial Participant      No            ECOG Performance Status      0            Most Recent Lab Findings      Laboratory Tests      3/20/19 08:40            4/3/19 08:52            Laboratory Tests            Test       3/20/19      08:40             Magnesium Level       1.66 mg/dL      (1.60-2.30)             PAST, FAMILY   Past Medical History      Past Medical History:  Arthritis, Heart Attack, Hypertension      Hematology/Oncology (M):  GI Cancer (ESOPHAAGEAL CA), Oral Cancer (ESOPHAGEAL     CA), Skin Cancer            Past Surgical History      Biopsy (X 4), Coronary Stent, Joint Replacement, Skin Cancer Removal            Family History      Family History:  Uterine Cancer (MOTHER)            Social History      Marital Status:        Lives independently:  Yes      Number of Children:  3      Occupation:  RETIRED            Tobacco Use      Tobacco status:  Former smoker            Alcohol Use      Alcohol intake:  None            Substance Use      Substance use:  Denies use            REVIEW OF SYSTEMS      General:  Denies: Appetite Change, Fatigue, Fever, Night Sweats, Weight Gain,     Weight Loss      Eye:  Denies: Blurred Vision, Corrective Lenses, Diplopia, Eye Irritation, Eye     Pain, Eye Redness, Spots in Vision, Vision Loss      ENT:  Denies: Headache, Hearing Loss, Hoarseness, Seizures, Sinus Congestion,     Sore Throat      Cardiovascular:  Denies: Chest Pain, Edema Ankles, Edema Legs, Irregular     Heartbeat, Palpitations      Respiratory:  Denies: Coughing Blood, Productive Cough, Shortness of Air,     Wheezing      Gastrointestinal:  Denies: Bloody Stools, Constipation, Diarrhea, Frequent     Heartburn, Nausea, Problem Swallowing, Tarry Stools, Unable to Control Bowels,     Vomiting      Genitourinary (male):  Denies: Blood in Urine, Decrease Urine Stream, Frequent     Urination, Incontinence, Painful Urination      Musculoskeletal:  Denies: Back Pain, Leg Cramps, Muscle Pain, Muscle Weakness,     Painful Joints, Swollen Joints      Integumentary:  Denies: Bleeds Easily, Bruises Easily, Hair Changes, Jaundice,     Lesions, Mole Changes, Nail Changes, Pigment Changes, Rash, Skin Discoloration      Neurologic:  Denies: Dizziness, Fainting, Numbness\Tingling, Paralysis, Seizures       Psychiatric:  Denies: Anxiety, Confused, Depression, Disoriented, Memory Loss      Endocrine:  Denies: Cold Intolerance, Diabetes, Excessive Sweating, Excessive T    jillian, Excessive Urination, Heat Intolerance, Flushing, Hyperthyroidism,     Hypothyroidism      Hematologic/Lymphatic:  Denies: Bruising, Bleeding, Enlarged Lymph Nodes,     Recurrent Infections, Transfusions            VITAL SIGNS AND SCORES      Vitals      Height 5 ft 9 in / 175.26 cm      Weight 263 lbs 3.668 oz / 119.4 kg      BSA 2.32 m2      BMI 38.9 kg/m2      Temperature 98.7 F / 37.06 C - Temporal      Pulse 71      Respirations 20      Blood Pressure 142/83 Sitting      Pulse Oximetry 93%            Pain Score      Pain Scale Used:  Numerical      Pain Intensity:  0            Fatigue Score      Fatigue (0-10 scale):  0 (none)            EXAM      General Appearance:  Positive for: Alert, Oriented x3, Cooperative;          Negative for: Acute Distress      Eye:  Positive for: Anicteric Sclerae, Moist Conjunctiva      Neck:  Positive for: Supple;          Negative for: JVD, Masses      Respiratory:  Positive for: CTAB, Normal Respiratory Effort      Chest:  Port in Place      Abdomen/Gastro:  Positive for: Normal Active Bowel Sounds, Soft;          Negative for: Distention, Hepatosplenomegaly, Tenderness      Cardiovascular:  Positive for: RRR;          Negative for: Gallop, Murmur, Peripheral Edema, Rub      Psychiatric:  Positive for: Appropriate Affect, Intact Judgement      Lower Extremities:  Negative for: Edema      Upper Extremities:  Negative for: Clubbing, Digital Cyanosis, Digital Ischemia      Lymphatic:  Negative for: Cervical, Infraclavicular, Supraclavicular            PREVENTION      Date Influenza Vaccine Given:  Dec 1, 2018      2 or More Falls Past Year?:  No      Fall Past Year with Injury?:  No      Encouraged to follow-up with:  PCP regarding preventative exams.      Chart initiated by      DIANA COSME MA             ALLERGY/MEDS      Allergies      Coded Allergies:             NO KNOWN DRUG ALLERGIES (Verified  Allergy, Unknown, 4/3/19)            Medications      Last Reconciled on 4/3/19 09:44 by DEBBIE TAVAREZ      Lidocaine/Prilocaine (Emla) 30 Gm Cream..g.      1 APL TOPICAL ONCE for apply to port site w/chemo, #2 GM 6 Refills         Prov: JUNERUBA         2/26/19       Prochlorperazine Maleate (Prochlorperazine Maleate) 10 Mg Tab      10 MG PO Q6H PRN for NAUSEA AND/OR VOMITING for 30 Days, #120 TAB 3 Refills         Prov: DEBBIE TAVAREZ         2/13/19       Ondansetron Hcl (ONDANSETRON HCL) 4 Mg Tablet      8 MG PO Q8H PRN for NAUSEA AND/OR VOMITING for 30 Days, #180 TAB 3 Refills         Prov: DEBBIE TAVAREZ         2/13/19       Aspirin Chew (Aspirin Baby) 81 Mg Tab.chew      81 MG PO QDAY, #30 TAB.CHEW 0 Refills         Reported         2/13/19       Allopurinol (Zyloprim*) 100 Mg Tablet      100 MG PO BID for 30 Days, #60 TAB 0 Refills         Reported         2/13/19       Furosemide* (Lasix*) 40 Mg Tablet      40 MG PO QDAY, #30 TAB 0 Refills         Reported         2/13/19       Pravastatin Sod (Pravastatin*) 40 Mg Tablet      80 MG PO QDAY, #60 TAB 0 Refills         Reported         2/13/19       amLODIPine (amLODIPine) 2.5 Mg Tablet      5 MG PO QDAY, #60 TAB 0 Refills         Reported         2/13/19       Metoprolol Tartrate (Metoprolol Tartrate*) 25 Mg Tablet      25 MG PO BID, #60 TAB 0 Refills         Reported         2/13/19       Prednisolone* (Prednisolone*) 5 Mg Tab      5 MG PO QDAY, TAB         Reported         2/13/19       Telmisartan (Micardis*) 40 Mg Tablet      80 MG PO QDAY, TAB         Reported         2/13/19       Pantoprazole (Protonix*) 20 Mg Tablet      40 MG PO BIDAC, #120 TAB 0 Refills         Reported         2/13/19      Medications Reviewed:  No Changes made to meds            IMPRESSION/PLAN      Impression      Esophageal cancer, metastatic            Diagnosis       Esophageal cancer, stage IV - C15.9      Patient is on palliative chemotherapy with the FOLFOX regimen.  He has one focus    in the liver consistent with metastatic disease.  It was not an area that was     easily amenable to biopsy.  Tolerating his chemotherapy well.  He is due for     cycle 3 today.  Lab work looks good.  Proceed with treatment as planned.  RTC 2     weeks for OV, cycle 4 with labs prior.  Restaging scans after cycle 4.            Patient Education      Patient Education Provided:  Yes                 Disclaimer: Converted document may not contain table formatting or lab diagrams. Please see [employer] for the authenticated document.

## 2021-05-28 NOTE — PROGRESS NOTES
Patient: COLLEEN CONNOLLY     Acct: [id number]     Report: #[id number]  UNIT #: [id number]     : 1950    Encounter Date:10/02/2019  PRIMARY CARE: SATISH CASTRO  ***Signed***  --------------------------------------------------------------------------------------------------------------------  NURSE INTAKE      Visit Type      Established Patient Visit            Chief Complaint      TX DEPENDING ON WHAT HE SAYS      Intent of Therapy:  Palliative            Referring Provider/Copies To      Provider Not Found in Lookup:  DANA RODRIGUEZ      Primary Care Provider:  SATISH CASTRO            History and Present Illness      Past Oncology Illness History      Mr. Connolly is a 68-year-old white male who presents today for evaluation and     discussion of treatment options for his recently diagnosed esophageal cancer.      Patient states that this was initially identified when he was noted to be iron     deficient by his rheumatologist who was doing routine blood work for follow-up     of his methotrexate and rheumatoid arthritis treatment.  He underwent     colonoscopy which was benign as well as EGD at his local hospital that showed a     lesion in the distal esophagus.  He was seen by surgical oncology at [facility], Dr. Do.  Repeat EGD confirmed lesion in the lower     esophagus/GE junction area.  Biopsies showed marked atypia.  Surgery was     considered but patient and family desired a second opinion.  They were then seen    by CT surgery at [facility], Dr. Girard who repeated the     testing and this time biopsy was positive for adenocarcinoma.  He underwent     staging endoscopic ultrasound which identified a lesion in the distal esophagus,    GE junction, cardia of the stomach.  A cardia lymph node was also positive on     FNA giving him N1 disease.  His [place] physicians have recommended     neoadjuvant concurrent chemo RT followed by resection based on  the trial CROSS     trial.  Patient states that he has had no issues with swallowing, dysphagia,     stomach or chest pain, weight loss, nausea or vomiting.  He continues to take     methotrexate for his rheumatoid arthritis and has considerable difficulties     especially with his hands.  He reports that he has recently completed intrave    nous iron therapy.      Upon simulation for esophageal radiation-an area was found on the liver--right     lobe of liver.  MRI liver showed 9 mm lesion in the inferior right hepatic lobe     shows features suspicious for a single hepatic metastasis, particularly given     history of malignancy.       Started on palliative FOLFOX            HPI - Oncology Interim      f/u met esophageal ca--due for C3d1 Cyramza/Taxol--he states he feels     good--states energy better-feels like he is doing more.  Legs are still swollen     but better after stopping amlodipine.    Appetite is good; wt stable.  His RA is    doing very well-closed his fists-states he has not been able to do that in yrs.     He does have continuous neuropathy in feet bilaterally. Only time it really     bothers is at night, unable to keep feet on floor.  No distress noted.            Cancer Details            Adenocarcinoma of gastroesophageal junction            Metastatic Sites      Liver            Clinical Staging      Stage IV            Treatments      Chemotherapy      3/6/19-4/17/17 completed 4C FOLFOX  progression  5/22/19-7/3/19 completed 3C     Keytruda   progression  Cyramza/Taxol initiated 8/7/19            Clinical Trial Participant      No            ECOG Performance Status      0            Most Recent Lab Findings            Item Value  Date Time             White Blood Count 11.10 10*3/uL H 10/2/19 0840             Hemoglobin 12.7 g/dL L 10/2/19 0840             Hematocrit 41.2 % L 10/2/19 0840             Platelet Count 313 10*3/uL 10/2/19 0840             Granulocytes # 7.91 10*3/uL 10/2/19 0840              Sodium Level 142 mmol/L 10/2/19 0840             Potassium Level 3.1 mmol/L L 10/2/19 0840             Chloride Level 102 mmol/L 10/2/19 0840             Carbon Dioxide Level 29 mmol/L 10/2/19 0840             Anion Gap 14 mmol/L 10/2/19 0840             Blood Urea Nitrogen 18 mg/dL 10/2/19 0840             Creatinine 0.84 mg/dL 10/2/19 0840             Glomerular Filtration Rate Calc >60 mL/min/{1.73_m2} 10/2/19 0840             BUN/Creatinine Ratio 21 {ratio} H 10/2/19 0840             Glucose Level 86 mg/dL 10/2/19 0840             Serum Osmolality 295 10/2/19 0840             Calcium Level 9.5 mg/dL 10/2/19 0840             Total Bilirubin 0.56 mg/dL 10/2/19 0840             Aspartate Amino Transf (AST/SGOT) 13 U/L L 10/2/19 0840             Alkaline Phosphatase 85 U/L 10/2/19 0840             Alanine Aminotransferase (ALT/SGPT) 11 U/L 10/2/19 0840             Total Protein 6.8 g/dL 10/2/19 0840             Albumin 3.6 g/dL 10/2/19 0840             Globulin 3.2 g/dL 10/2/19 0840             Albumin/Globulin Ratio 1.1 {ratio} L 10/2/19 0840             Urine Collection Type Clean Catch NA 10/2/19 0850             Urine Color YELLOW NA 10/2/19 0850             Urine Appearance CLEAR NA 10/2/19 0850             Urine pH 7.5 (pH) 10/2/19 0850             Urine Specific Gravity 1.009 NA 10/2/19 0850             Urine Protein NEGATIVE mg/dL 10/2/19 0850             Urine Glucose (UA) NEGATIVE mg/dL 10/2/19 0850             Urine Ketones NEGATIVE mg/dL 10/2/19 0850             Urine Occult Blood NEGATIVE NA 10/2/19 0850             Urine Nitrite NEGATIVE NA 10/2/19 0850             Urine Bilirubin NEGATIVE NA 10/2/19 0850             Urine Urobilinogen 0.2 {EhrlichU}/dL 10/2/19 0850             Urine Leukocyte Esterase NEGATIVE NA 10/2/19 0850            Laboratory Tests      9/18/19 09:31            Most Recent Imaging Findings      9/30/19            PROCEDURE:   CT ABDOMEN; CT CHEST; CT PELVIS WITH  CONTRAST             COMPARISON:   [facility], CT, CT CHEST ABD PEL W CONTRAST,     7/18/2019, 13:06.             INDICATIONS:   ESOPHAGEAL CANCER AND LIVER CANCER FOLLOWUP             TECHNIQUE:   After obtaining the patient's consent, CT images were obtained with     intravenous contrast       material.      PROTOCOL:     Standard imaging protocol performed                RADIATION:     DLP: 2447mGy*cm          Automated exposure control was utilized to minimize radiation dose.       CONTRAST:   100cc Isovue 370 I.V.      LABS:     eGFR: >60ml/min/1.73m2             FINDINGS:         Chest:  Lungs are clear.  No adenopathy in the chest.  No aggressive appearing     bone change.             Abdomen:  Hepatic lesions are slightly smaller.  A lesion in the left hepatic     lobe measures 1.3 cm       (image 23, previously 1.7 cm.  A lesion in the right hepatic lobe measures 1.3     cm (image 19),       previously 1.7 cm.             Spleen, kidneys, adrenal glands, pancreas are unremarkable.  Uncomplicated     cholelithiasis.  Bowel       loops nondilated.  Appendix normal.             Pelvis:  No pelvic mass or adenopathy.  No aggressive appearing bone change.  L5     pars defects.  1.4       cm anterolisthesis L5 on S1 is similar to the prior.               CONCLUSION:   Hepatic lesions are slightly smaller.             No convincing evidence for active metastatic disease in the chest or pelvis.            PAST, FAMILY   Past Medical History      Past Medical History:  Arthritis, Heart Attack, Hypertension      Hematology/Oncology (M):  GI Cancer, Oral Cancer, Skin Cancer            Past Surgical History      Biopsy (LIVER), Coronary Stent, Joint Replacement, Skin Cancer Removal            Family History      Family History:  Uterine Cancer            Social History      Marital Status:        Lives independently:  Yes      Number of Children:  3      Occupation:  RETIRED            Tobacco Use       Tobacco status:  Former smoker            Alcohol Use      Alcohol intake:  None            Substance Use      Substance use:  Denies use            REVIEW OF SYSTEMS      General:  Denies: Appetite Change, Fatigue      Eye:  Admits Corrective Lenses; Denies Vision Changes      Cardiovascular:  Denies Chest Pain, Denies Palpitations      Respiratory:  Denies: Productive Cough, Shortness of Air      Gastrointestinal:  Denies Constipation, Denies Diarrhea, Denies Nausea/Vomiting      Musculoskeletal:  Denies Muscle Pain; Admits Painful Joints (RA)      Neurologic:  Denies Dizziness; Admits Numbness\Tingling      Hematologic/Lymphatic:  Denies Bruising, Denies Bleeding            VITAL SIGNS AND SCORES      Vitals      Weight 244 lbs 7.842 oz / 110.9 kg      Temperature 98.4 F / 36.89 C - Temporal      Pulse 60      Respirations 18      Blood Pressure 148/90 Sitting            Pain Score      Pain Scale Used:  Numerical      Pain Intensity:  0            Fatigue Score      Fatigue (0-10 scale):  0 (none)            EXAM      General Appearance:  Positive for: Alert, Oriented x3, Cooperative;          Negative for: Acute Distress      Eye:  Positive for: Anicteric Sclerae, Moist Conjunctiva      Neck:  Positive for: Supple;          Negative for: JVD, Masses      Respiratory:  Positive for: CTAB, Normal Respiratory Effort      Chest:  Port in Place      Abdomen/Gastro:  Positive for: Normal Active Bowel Sounds, Soft;          Negative for: Distention, Hepatosplenomegaly, Tenderness      Cardiovascular:  Positive for: Peripheral Edema (2+ bilateral lower     extremities), RRR;          Negative for: Gallop, Murmur, Rub      Psychiatric:  Positive for: Appropriate Affect, Intact Judgement      Upper Extremities:  Negative for: Clubbing, Digital Cyanosis, Digital Ischemia      Lymphatic:  Negative for: Cervical, Infraclavicular, Supraclavicular            PREVENTION      Hx Influenza Vaccination:  Yes      Date  Influenza Vaccine Given:  Dec 1, 2018      2 or More Falls Past Year?:  No      Fall Past Year with Injury?:  No      Encouraged to follow-up with:  PCP regarding preventative exams.      Chart initiated by      DIANA COSME MA            ALLERGY/MEDS      Allergies      Coded Allergies:             NO KNOWN DRUG ALLERGIES (Verified  Allergy, Unknown, 10/2/19)            Medications      Last Reconciled on 10/2/19 09:53 by YADI JC      DULoxetine (Cymbalta) 20 Mg Capsule.dr      20 MG PO QDAY for 30 Days, #30 CAP 0 Refills         Prov: RUBA JUNE         10/2/19       (herbs)   No Conflict Check               Reported         10/2/19       Chlorthalidone (CHLORTHALIDONE) 25 Mg Tablet      12.5 MG PO QDAY for 30 Days, #15 TAB         Reported         10/2/19       Methotrexate Sodium (Methotrexate) 2.5 Mg Tab      15 MG PO Watson, TAB         Reported         10/2/19       PACLitaxel (taxOL) 6 Mg/1 Ml Vial      0 IV D 1, 8      Reported         7/31/19       (Cyramza)   No Conflict Check      IV D 1      Reported         7/31/19       Telmisartan (Micardis) 20 Mg Tab      20 MG PO QDAY, TAB         Reported         7/31/19       Allopurinol (Allopurinol) 300 Mg Tablet      300 MG PO QDAY, #30 TAB 0 Refills         Reported         6/12/19       Ubidecarenone (Co Q-10) 1 Each Capsule      100 MG PO QDAY, CAP         Reported         5/15/19       Folic Acid (Folic Acid*) 1 Mg Tablet      1 MG PO QDAY, #30 TAB 0 Refills         Reported         5/1/19       Lidocaine/Prilocaine (Emla) 30 Gm Cream..g.      1 APL TOPICAL ONCE for apply to port site w/chemo, #2 GM 6 Refills         Prov: RUBA JUNE         2/26/19       Prochlorperazine Maleate (Prochlorperazine Maleate) 10 Mg Tab      10 MG PO Q6H PRN for NAUSEA AND/OR VOMITING for 30 Days, #120 TAB 3 Refills         Prov: DEBBIE TAVAREZ         2/13/19       Ondansetron Hcl (ONDANSETRON HCL) 4 Mg Tablet      8 MG PO Q8H PRN for NAUSEA AND/OR  VOMITING for 30 Days, #180 TAB 3 Refills         Prov: DEBBIE TAVAREZ         2/13/19       Aspirin Chew (Aspirin Baby) 81 Mg Tab.chew      81 MG PO QDAY, #30 TAB.CHEW 0 Refills         Reported         2/13/19       Furosemide* (Lasix*) 40 Mg Tablet      40 MG PO QDAY, #30 TAB 0 Refills         Reported         2/13/19       Pravastatin Sod (Pravastatin*) 40 Mg Tablet      80 MG PO QDAY, #60 TAB 0 Refills         Reported         2/13/19       Metoprolol Tartrate (Metoprolol Tartrate) 25 Mg Tablet      25 MG PO BID, #60 TAB 0 Refills         Reported         2/13/19       Prednisolone* (Prednisolone*) 5 Mg Tab      5 MG PO QDAY, TAB         Reported         2/13/19       Pantoprazole (Protonix*) 20 Mg Tablet      40 MG PO BIDAC, #120 TAB 0 Refills         Reported         2/13/19      Medications Reviewed:  Changes made to meds            IMPRESSION/PLAN      Diagnosis      Metastasis from esophageal cancer - C79.9, C15.9      Patient is on Taxotere and Cyramza.  Tolerating very well.  Restaging scans show     improvement in his known disease.  Lab work is adequate for treatment.  Proceed     with next cycle as planned.  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 begin cymbalta 20 mg daily. reassess next visit            Hypokalemia - E87.6      Potassium is low which may be related to his diuretic therapy.  Begin potassium     supplement daily.  Recheck next visit            Notes      New Medications      * Methotrexate Sodium (Methotrexate) 2.5 MG TAB: 15 MG PO Watson      * CHLORTHALIDONE 25 MG TABLET: 12.5 MG PO QDAY 30 Days #15      * DULoxetine (Cymbalta) 20 MG CAPSULE.DR: 20 MG PO QDAY 90 Days #90      * POTASSIUM CHLORIDE (K-Tab*) 10 MEQ TABLET.ER: 20 MEQ PO QDAY low potassium 30       Days #60            Patient Education            Duloxetine      Patient Education Provided:  Yes                  Disclaimer: Converted document may not contain table formatting or lab diagrams. Please see [employer] for the authenticated document.

## 2021-05-28 NOTE — PROGRESS NOTES
Patient: COLLEEN CONNOLLY     Acct: [id number]     Report: #[id number]  UNIT #: [id number]     : 1950    Encounter Date:2020  PRIMARY CARE: SATISH CASTRO  ***Signed***  --------------------------------------------------------------------------------------------------------------------  NURSE INTAKE      Visit Type      Established Patient Visit            Chief Complaint      esophageal ca/ct results      Intent of Therapy:  Palliative            Referring Provider/Copies To      Primary Care Provider:  SATISH CASTRO      Copies To:   SATISH CASTRO            History and Present Illness      Past Oncology Illness History      Mr. Connolly is a 70-year-old white male who presents with esophageal cancer.      Patient states that this was initially identified when he was noted to be iron     deficient by his rheumatologist who was doing routine blood work for follow-up     of his methotrexate and rheumatoid arthritis treatment.  He was seen by surgical    oncology at [facility], Dr. Do.  Repeat EGD confirmed lesion     in the lower esophagus/GE junction area.  Biopsies showed marked atypia.      Surgery was considered but patient and family desired a second opinion.  They     were then seen by CT surgery at [facility], Dr. Girard who     repeated the testing and this time biopsy was positive for adenocarcinoma.  He     underwent staging endoscopic ultrasound which identified a lesion in the distal     esophagus, GE junction, cardia of the stomach.  A cardia lymph node was also     positive on FNA giving him N1 disease.  His [facility] physicians have     recommended neoadjuvant concurrent chemo RT followed by resection based on the     trial CROSS trial.  Upon simulation for esophageal radiation-an area was found     on the liver--right lobe of liver.  MRI liver showed 9 mm lesion in the inferior    right hepatic lobe shows features suspicious for a single hepatic  metastasis,     particularly given history of malignancy.20 Cycle 10 day 15 held due to     increased productive cough. 10 days of antibiotics prescribed.  20     progression stopped Cyramza/Taxol started Irinotecan. 2020 patient     experience a significant amount of diarrhea after second treatment which     required hospitalization. Irinotecan stopped and Lonsurf initiated. 20 CT     scans revealed progression. Referred to [facility] for clinical trial options.            HPI - Oncology Interim      Mr. Connolly returns today for ongoing treatment of his esophageal cancer,     metastatic.  He is on fourth line therapy with single agent oral Lonsurf.  He is    tolerating the pills well.  He denies any problems or side effects.  He reports     occasional dysphagia with more solid foods but not with thin foods or liquids.      He denies any masses or lymphadenopathy.  He reports normal bowel habits-he     denies diarrhea.  He denies any issues from his Port-A-Cath.            Cancer Details            Adenocarcinoma of gastroesophageal junction            Clinical Staging      Stage IV            Treatments      Chemotherapy      3/6/19-17 completed 4C FOLFOX  progression  19-7/3/19 completed 3C     Keytruda   progression  Cyramza/Taxol initiated 19 progression     stopped Cyramza/Taxol started Irinotecan. 20 Irinitecan stopped and Lonsurf    initiated.            Clinical Trial Participant      No            ECOG Performance Status      0            Most Recent Lab Findings      Laboratory Tests      20 09:33            Laboratory Tests            Test       20      09:33             Magnesium Level       1.85 mg/dL      (1.60-2.30)            Most Recent Imaging Findings      Patient: COLLEEN CONNOLLY   Acct: #[id number]   Report: #[id number]            UNIT #: [id number]    DOS: 20 0841      INSURANCE:MEDICARE PART A   LOCATION:CT     : 1950             PROVIDERS      ADMITTING:     ATTENDING: DEBBIE TAVAREZ      FAMILY:  SATISH CASTRO   ORDERING:  DEBBIE TAVAREZ         OTHER:    DICTATING:  Gino Montano MD            REQ #:[id number]   EXAM:CTACPWC - CT ABD CHEST PEL w CONTR      REASON FOR EXAM:  MALIGNANT NEOPLASM OF UNSPECIFIED SITE      REASON FOR VISIT:  MALIGNANT NEOPLASM OF UNSPECIFIED SITE            *******Signed******         PROCEDURE:   CT ABDOMEN; CT CHEST; CT PELVIS WITH CONTRAST             COMPARISON:   [facility], CT, CT CHEST ABD PEL W CONTRAST,     5/04/2020, 14:30.  [facility], CT, CT CHEST ABD PEL W CONTRAST, 8/24/2020, 13:14.             INDICATIONS:   MALIGNANT NEOPLASM OF UNSPECIFIED SITE             TECHNIQUE:   After obtaining the patient's consent, CT images were obtained with    intravenous contrast       material.             FINDINGS:         Chest:             The central tracheobronchial tree is clear.  Two previously described adjacent 3    mm right middle       lobe nodules have resolved.  A 6 mm left upper lobe nodule on image 19 is     unchanged.  Some       nodularity along the right lung base is unchanged, possibly related to     aspiration.  No new       pulmonary nodule is identified.  There is no pleural effusion.             A left subclavian port has its tip at the mid SVC.  The heart size appears     normal, with partially       calcified atherosclerotic disease in the coronary arteries.  The great vessels     are normal in       caliber.  No abnormally enlarged lymph nodes are identified.             No aggressive osseous lesions are identified.             Abdomen/pelvis:             Multiple hepatic metastasis have overall increased in size.  An index metastasis    within the left       lateral hepatic segment on image 31 measures 2.4 cm, previously 1.7 cm.  An     index metastasis within       the left medial hepatic segment on image 34 measures 2.2 cm, previously 1.8  cm.     Multiple stones       are present within the gallbladder.  The adrenal glands, left kidney, spleen,     and pancreas are       unremarkable.  There is a tiny nonobstructing right renal stone.             The stomach appears normal.  The small bowel appears normal in caliber and co    nfiguration.  The       colon appears normal.  The appendix appears normal.  There is no ascites or     loculated collection.        No abnormally enlarged lymph nodes are identified.             The rectum, prostate, and urinary bladder are unremarkable.             No aggressive osseous lesions are identified.  There are bilateral L5 pars     defects, with grade 1/2       anterolisthesis of L5 on S1.               CONCLUSION:         1. Mixed response to treatment.  Pulmonary nodules have improved, but hepatic     metastasis have       worsened.      2. Nodularity along right lung base, possibly secondary to aspiration.      3. Cholelithiasis.      4. Nonobstructing right renal stone.      5. Bilateral L5 pars defects, with grade 1/2 anterolisthesis of L5 on S1.              CARLEE JAY MD             Electronically Signed and Approved By: CARLEE JAY MD on 12/04/2020 at 10:5    6                               Until signed, this is an unconfirmed preliminary report that may contain      errors and is subject to change.                                              RODD1:      D:12/04/20 1056            PAST, FAMILY   Past Medical History      Past Medical History:  Arthritis, Heart Attack, Hypertension      Hematology/Oncology (M):  GI Cancer, Oral Cancer, Skin Cancer            Past Surgical History      Biopsy, Coronary Stent, Joint Replacement, Skin Cancer Removal            Family History      Family History:  Uterine Cancer            Social History      Lives independently:  Yes      Number of Children:  3      Occupation:  RETIRED            Tobacco Use      Tobacco status:  Former smoker            Substance  Use      Substance use:  Denies use            REVIEW OF SYSTEMS      General:  Admits: Weight Loss      Eye:  Admits Corrective Lenses      ENT:  Admits Headache, Admits Hearing Loss      Cardiovascular:  Denies Chest Pain      Respiratory:  Denies: Cough, Shortness of Air      Gastrointestinal:  Denies Diarrhea      Genitourinary:  Denies Incontinence      Musculoskeletal:  Admits Muscle Pain, Admits Painful Joints            VITAL SIGNS AND SCORES      Vitals      Weight 242 lbs 8.096 oz / 110 kg      Temperature 97.9 F / 36.61 C - Temporal      Pulse 69      Respirations 18      Blood Pressure 136/64 Sitting, Left Arm      Pulse Oximetry 94%, RM AIR            Pain Score      Pain Scale Used:  Numerical      Pain Intensity:  2            Fatigue Score      Fatigue (0-10 scale):  0 (none)            EXAM      General Appearance:  Positive for: Alert, Cooperative;          Negative for: Acute Distress      Neck:  Positive for: Supple;          Negative for: JVD, Masses      Respiratory:  Positive for: CTAB, Normal Respiratory Effort      Chest:  Port in Place      Abdomen/Gastro:  Positive for: Normal Active Bowel Sounds, Soft;          Negative for: Distention, Hepatosplenomegaly, Tenderness      Cardiovascular:  Positive for: Peripheral Edema (Trace bilateral edema), RRR;          Negative for: Gallop, Murmur, Rub      Psychiatric:  Positive for: Appropriate Affect, Intact Judgement      Lymphatic:  Negative for: Cervical, Infraclavicular, Supraclavicular            PREVENTION      Hx Influenza Vaccination:  Yes      Date Influenza Vaccine Given:  Nov 1, 2020      2 or More Falls in Past Year?:  No      Fall Past Year with Injury?:  No      Encouraged to follow-up with:  PCP regarding preventative exams.      Chart initiated by      DIANA COSME MA            ALLERGY/MEDS      Allergies      Coded Allergies:             NO KNOWN DRUG ALLERGIES (Verified  Allergy, Unknown, 11/3/20)            Medications       Last Reconciled on 12/9/20 12:32 by DEBBIE TAVAREZ      Potassium Chloride (K-Tab*) 10 Meq Tablet.er      20 MEQ PO QDAY for low potassium for 30 Days, #60 TAB 3 Refills         Prov: DEBBIE TAVAREZ         11/10/20       Trifluridine/Tipiracil HCl (Lonsurf 20 mg-8.19 mg Tablet) 1 Each Tablet      4 TAB PO BID for 10 Days, #80 TAB 5 Refills         Prov: DEBBIE TAVAREZ         9/30/20       Diphenoxylate/Atropine (Diphenoxylate/Atropine) 1 Each Tablet      2.5 MG PO QID for diarrhea, #60 TAB 0 Refills         Prov: DEBBIE TAVAREZ         9/17/20       Magnesium Oxide (Magnesium Oxide*) 400 Mg Tablet      400 MG PO QDAY for 30 Days, #30 TAB 4 Refills         Prov: DEBBIE TAVAREZ         9/16/20       Prochlorperazine Maleate (Prochlorperazine Maleate) 10 Mg Tab      10 MG PO Q6H PRN for NAUSEA AND/OR VOMITING for 30 Days, #120 TAB 3 Refills         Prov: DEBBIE TAVAREZ         9/10/20       Levothyroxine (Synthroid) 0.025 Mg      0.0125 MG PO QDAY@07, #15 TAB 0 Refills         Reported         8/12/20       Mouthwash (Magic Mouthwash) 1 Each Bottle      15 ML PO Q4H PRN for MOUTH DISCOMFORT, #300 ML 1 Refill         Prov: DEBBIE TAVAREZ         5/20/20       Irbesartan (Irbesartan) 300 Mg Tablet      300 MG PO QDAY for 30 Days, #30 TAB         Reported         1/29/20       Pyridoxine (Vitamin B6) 100 Mg Tablet      100 MG PO BID, TAB         Reported         11/13/19       Methotrexate Sodium (Methotrexate) 2.5 Mg Tab      12.5 MG PO Watson, TAB         Reported         11/6/19       (herbs)   No Conflict Check               Reported         10/2/19       Chlorthalidone (CHLORTHALIDONE) 25 Mg Tablet      12.5 MG PO QDAY for 30 Days, #15 TAB         Reported         10/2/19       Allopurinol (Allopurinol) 300 Mg Tablet      300 MG PO QDAY, #30 TAB 0 Refills         Reported         6/12/19       Ubidecarenone (Co Q-10) 1 Each Capsule      100 MG PO QDAY, CAP         Reported         5/15/19       Folic Acid (Folic Acid) 1  Mg Tablet      1 MG PO QDAY, #30 TAB 0 Refills         Reported         5/1/19       Lidocaine/Prilocaine (Emla) 30 Gm Cream..g.      1 APL TOPICAL ONCE for apply to port site w/chemo, #2 GM 6 Refills         Prov: RUBA JUNE         2/26/19       Ondansetron Hcl (ONDANSETRON HCL) 4 Mg Tablet      8 MG PO Q8H PRN for NAUSEA AND/OR VOMITING for 30 Days, #180 TAB 3 Refills         Prov: DEBBIE TAVAREZ         2/13/19       Aspirin Chew (Aspirin Baby) 81 Mg Tab.chew      81 MG PO QDAY, #30 TAB.CHEW 0 Refills         Reported         2/13/19       Furosemide* (Lasix*) 40 Mg Tablet      60 MG PO QDAY, #30 TAB 0 Refills         Reported         2/13/19       Metoprolol Tartrate (Metoprolol Tartrate) 25 Mg Tablet      25 MG PO BID, #60 TAB 0 Refills         Reported         2/13/19       prednisoLONE (prednisoLONE) 5 Mg Tab      5 MG PO QDAY, TAB         Reported         2/13/19       Pantoprazole (Protonix) 20 Mg Tablet      40 MG PO BIDAC, #120 TAB 0 Refills         Reported         2/13/19      Medications Reviewed:  No Changes made to meds            IMPRESSION/PLAN      Diagnosis      Malignant neoplasm of lower third of esophagus - C15.5            Metastasis from esophageal cancer - C79.9, C15.9            Notes      Patient returns for ongoing treatment of his metastatic esophageal cancer.  He     is on fourth line Lonsurf.  Tolerating well, unfortunately his recent scans show     progression with enlarging lesions in the liver.  The pulmonary nodules are     stable.  His performance status remains good, ECOG PS 0.  He has seen most of     the active agents that have utility in this disease.  We discussed referral to     [facility] for consideration of second opinion/clinical trials.      He is agreeable to that trip.  I will see him back after [facility] port flush today     and monthly while not in active use.      New Office Procedures      * Port Flush, 12/09/20         Dx: Metastasis from  esophageal cancer - C79.9, C15.9            Pain      Pain Zero Today            Advanced Care Plan Discussion      Declines Discussion [id number]            Patient Education      Patient Education Provided:  Yes            Electronically signed by DEBBIE TAVAREZ  12/09/2020 12:32       Disclaimer: Converted document may not contain table formatting or lab diagrams. Please see [employer] for the authenticated document.

## 2021-05-28 NOTE — PROGRESS NOTES
Patient: COLLEEN CONNOLLY     Acct: [id number]     Report: #[id number]  UNIT #: [id number]     : 1950    Encounter Date:2019  PRIMARY CARE: SATISH CASTRO  ***Signed***  --------------------------------------------------------------------------------------------------------------------  NURSE INTAKE      Visit Type      Established Patient Visit            Chief Complaint      ESOPHAGEAL CA      Intent of Therapy:  Palliative            Referring Provider/Copies To      Provider Not Found in Lookup:  DANA RODRIGUEZ      Primary Care Provider:  SATISH CASTRO            History and Present Illness      Past Oncology Illness History      Mr. Connolly is a 68-year-old white male who presents today for evaluation and     discussion of treatment options for his recently diagnosed esophageal cancer.      Patient states that this was initially identified when he was noted to be iron     deficient by his rheumatologist who was doing routine blood work for follow-up     of his methotrexate and rheumatoid arthritis treatment.  He underwent     colonoscopy which was benign as well as EGD at his local hospital that showed a     lesion in the distal esophagus.  He was seen by surgical oncology at [facility], Dr. Do.  Repeat EGD confirmed lesion in the lower     esophagus/GE junction area.  Biopsies showed marked atypia.  Surgery was     considered but patient and family desired a second opinion.  They were then seen    by CT surgery at [facility], Dr. Girard who repeated the     testing and this time biopsy was positive for adenocarcinoma.  He underwent     staging endoscopic ultrasound which identified a lesion in the distal esophagus,    GE junction, cardia of the stomach.  A cardia lymph node was also positive on     FNA giving him N1 disease.  His [facility] physicians have recommended ne    oadjuvant concurrent chemo RT followed by resection based on the trial CROSS    "  trial.  Patient states that he has had no issues with swallowing, dysphagia,     stomach or chest pain, weight loss, nausea or vomiting.  He continues to take     methotrexate for his rheumatoid arthritis and has considerable difficulties     especially with his hands.  He reports that he has recently completed     intravenous iron therapy.      Upon simulation for esophageal radiation-an area was found on the liver--right     lobe of liver.  MRI liver showed 9 mm lesion in the inferior right hepatic lobe     shows features suspicious for a single hepatic metastasis, particularly given     history of malignancy.       Started on palliative FOLFOX            HPI - Oncology Interim      F/u met esophageal ca--due for #3 Keytruda today.  Reports feeling better today     than has.  His joint discomfort has improved-saw Dr. Newsome yesterday.  Ongoing     fatigue reported; however, no routine exercise attempted. TSH 2.49. BLE with +2     edema noted; PCP is managing.  Rt inner thigh and inner knee with several (4)     reddened scabbed areas--they are dry.  Pt reports they appeared as \"whiteheads\"     and he picked at them.  Denies diarrhea.  Reports an occasional discomfort at rt    throat-\"like I have a tonsil stone\"; however, resolves.  He reports good     appetite with stable wt noted.            Cancer Details            Adenocarcinoma of gastroesophageal junction            Metastatic Sites      Liver            Clinical Staging      Stage IV            Treatments      Chemotherapy      3/6/19-4/17/17 completed 4C FOLFOX  progression  Keytruda prescribed 5/15/19            Clinical Trial Participant      No            ECOG Performance Status      0            Most Recent Lab Findings      Laboratory Tests      7/3/19 08:46            PAST, FAMILY   Past Medical History      Past Medical History:  Arthritis, Heart Attack, Hypertension      Hematology/Oncology (M):  GI Cancer, Oral Cancer, Skin Cancer            Past " Surgical History      Biopsy (LIVER), Coronary Stent, Joint Replacement, Skin Cancer Removal            Family History      Family History:  Uterine Cancer            Social History      Marital Status:        Lives independently:  Yes      Number of Children:  3      Occupation:  RETIRED            Tobacco Use      Tobacco status:  Former smoker            Alcohol Use      Alcohol intake:  None            Substance Use      Substance use:  Denies use            REVIEW OF SYSTEMS      General:  Admits: Fatigue      Eye:  Denies: Eye Pain      ENT:  Denies: Hearing Loss      Cardiovascular:  Admits: Edema Ankles, Edema Legs      Gastrointestinal:  Denies: Unable to Control Bowels      Genitourinary (male):  Denies: Painful Urination      Musculoskeletal:  Denies: Muscle Weakness      Integumentary:  Admits: Rash      Neurologic:  Denies: Fainting            VITAL SIGNS AND SCORES      Vitals      Height 5 ft 9 in / 175.26 cm      Weight 253 lbs 4.937 oz / 114.9 kg      BSA 2.28 m2      BMI 37.4 kg/m2      Temperature 97.5 F / 36.39 C - Temporal      Pulse 70      Respirations 20      Blood Pressure 141/77 Sitting      Pulse Oximetry 95%, RM AIR            Pain Score      Experiencing any pain?:  No      Pain Scale Used:  Numerical      Pain Intensity:  0            Fatigue Score      Experiencing any fatigue?:  Yes      Fatigue (0-10 scale):  5            EXAM      General Appearance:  Positive for: Alert, Oriented x3, Cooperative, Obese      Eye:  Positive for: Anicteric Sclerae, Moist Conjunctiva, Symmetry      HEENT:  Positive for: Oropharynx clear;          Negative for: Erythema, Exudates, Pallor, Thrush      Neck:  Positive for: Full ROM, Supple;          Negative for: JVD, Masses      Respiratory:  Positive for: CTAB, Normal Respiratory Effort;          Negative for: Rales, Rhochi, Stridor      Chest:  Port in Place      Abdomen/Gastro:  Positive for: Normal Active Bowel Sounds, Soft;          Negative  for: Distention, Guarding, Rebound, Tenderness      Cardiovascular:  Positive for: Normal PMI, Peripheral Edema, RRR;          Negative for: JVD, Murmur, Rub      Skin:  Positive for: Lesions (right inner thigh/knee), Normal Temperature, Kimberley    l Texture and Turgor, Normal Tone;          Negative for: Rash, Ulcers      Psychiatric:  Positive for: AAO X 3, Appropriate Affect, Intact Judgement      Neurologic:  Positive for: Cranial Ner II-XII Intact, Weakness (overall);          Negative for: Dizziness, Dysphagia, Headache, Numbness      Musculoskeletal:  Positive for: Full ROM Lower Extremety, Full ROM Upper     Extremety;          Negative for: Full Muscle Strength      Lower Extremities:  Positive for: Edema, Normal Gait and Station, Weakness      Upper Extremities:  Positive for: Edema (general puffiness of hands     bilaterally);          Negative for: Clubbing, Deformities, Digital Cyanosis, Digital Ischemia,     Weakness      Lymphatic:  Negative for: Infraclavicular, Posterior auricular, Preauricular,     Supraclavicular            PREVENTION      Hx Influenza Vaccination:  Yes      Date Influenza Vaccine Given:  Dec 1, 2018      2 or More Falls Past Year?:  No      Fall Past Year with Injury?:  No      Encouraged to follow-up with:  PCP regarding preventative exams.      Chart initiated by      DIANA COSME MA            ALLERGY/MEDS      Allergies      Coded Allergies:             NO KNOWN DRUG ALLERGIES (Verified  Allergy, Unknown, 7/3/19)            Medications      Last Reconciled on 7/3/19 10:49 by YADI JC      Hydrocodone/Acetaminophen 5/325 MG (Hydrocodone/Acetaminophen 5/325 MG) 1 Each     Tablet      1 TAB PO Q6H PRN for BREAKTHROUGH PAIN, TAB 0 Refills         Reported         6/12/19       Allopurinol (Allopurinol) 300 Mg Tablet      300 MG PO QDAY, #30 TAB 0 Refills         Reported         6/12/19       Methotrexate Sodium (Methotrexate) 2.5 Mg Tab      12.5 MG PO Watson, TAB          Reported         5/22/19       Ubidecarenone (Co Q-10) 1 Each Capsule      100 MG PO QDAY, CAP         Reported         5/15/19       Folic Acid (Folic Acid*) 1 Mg Tablet      1 MG PO QDAY, #30 TAB 0 Refills         Reported         5/1/19       amLODIPine (amLODIPine) 2.5 Mg Tablet      2.5 MG PO QDAY, #30 TAB 0 Refills         Reported         5/1/19       Lidocaine/Prilocaine (Emla) 30 Gm Cream..g.      1 APL TOPICAL ONCE for apply to port site w/chemo, #2 GM 6 Refills         Prov: RUBA JUNE         2/26/19       Prochlorperazine Maleate (Prochlorperazine Maleate) 10 Mg Tab      10 MG PO Q6H PRN for NAUSEA AND/OR VOMITING for 30 Days, #120 TAB 3 Refills         Prov: DEBBIE TAVAREZ         2/13/19       Ondansetron Hcl (ONDANSETRON HCL) 4 Mg Tablet      8 MG PO Q8H PRN for NAUSEA AND/OR VOMITING for 30 Days, #180 TAB 3 Refills         Prov: DEBBIE TAVAREZ         2/13/19       Aspirin Chew (Aspirin Baby) 81 Mg Tab.chew      81 MG PO QDAY, #30 TAB.CHEW 0 Refills         Reported         2/13/19       Furosemide* (Lasix*) 40 Mg Tablet      40 MG PO QDAY, #30 TAB 0 Refills         Reported         2/13/19       Pravastatin Sod (Pravastatin*) 40 Mg Tablet      80 MG PO QDAY, #60 TAB 0 Refills         Reported         2/13/19       Metoprolol Tartrate (Metoprolol Tartrate) 25 Mg Tablet      25 MG PO BID, #60 TAB 0 Refills         Reported         2/13/19       Prednisolone* (Prednisolone*) 5 Mg Tab      5 MG PO QDAY, TAB         Reported         2/13/19       Telmisartan (Micardis*) 40 Mg Tablet      80 MG PO QDAY, TAB         Reported         2/13/19       Pantoprazole (Protonix*) 20 Mg Tablet      40 MG PO BIDAC, #120 TAB 0 Refills         Reported         2/13/19      Medications Reviewed:  No Changes made to meds            IMPRESSION/PLAN      Impression      Met esophageal ca-tolerating Keytruda therapy with management of joint     pain/discomfort at this time.  No toxicities noted.  Due for scans.             Diagnosis      Fatigue         Fatigue, unspecified type         Fatigue type: unspecified            Metastasis from esophageal cancer - C79.9, C15.9            Notes      New Diagnostics      * Thyroid Profile, Routine         Dx: Fatigue - R53.83      * CT Abd/Pelvis/Chest W/Contrast, 3 Week         Dx: Metastasis from esophageal cancer - C79.9, C15.9            Plan      1.  Keytruda today as prescribed      2.  CT c/a/p prior to next appt      3.  Increase exercise      4.  RTC 3 wks assess and tx; discuss scan results            Patient Education            Chronic Venous Insufficiency      DI for Edema Due to Venous Stasis      Exercise (Alternative Therapy)      Patient Education Provided:  Yes                 Disclaimer: Converted document may not contain table formatting or lab diagrams. Please see [employer] for the authenticated document.

## 2021-05-28 NOTE — PROGRESS NOTES
Patient: COLLEEN CONNOLLY     Acct: [id number]     Report: #[id number]  UNIT #: [id number]     : 1950    Encounter Date:2020  PRIMARY CARE: SATISH CASTRO  ***Signed***  --------------------------------------------------------------------------------------------------------------------  NURSE INTAKE      Visit Type      Established Patient Visit            Chief Complaint      ESOPHAGEAL CA WITH LIVER METS HERE FOR TX      Intent of Therapy:  Palliative            Referring Provider/Copies To      Primary Care Provider:  SATISH CASTRO            History and Present Illness      Past Oncology Illness History      Mr. Connolly is a 69-year-old white male who presents with esophageal cancer.      Patient states that this was initially identified when he was noted to be iron     deficient by his rheumatologist who was doing routine blood work for follow-up     of his methotrexate and rheumatoid arthritis treatment.  He was seen by surgical    oncology at [facility], Dr. Do.  Repeat EGD confirmed lesion     in the lower esophagus/GE junction area.  Biopsies showed marked atypia.      Surgery was considered but patient and family desired a second opinion.  They     were then seen by CT surgery at [facility], Dr. Girard who     repeated the testing and this time biopsy was positive for adenocarcinoma.  He     underwent staging endoscopic ultrasound which identified a lesion in the distal     esophagus, GE junction, cardia of the stomach.  A cardia lymph node was also     positive on FNA giving him N1 disease.  His [employer] physicians have     recommended neoadjuvant concurrent chemo RT followed by resection based on the     trial CROSS trial.  Upon simulation for esophageal radiation-an area was found     on the liver--right lobe of liver.  MRI liver showed 9 mm lesion in the inferior    right hepatic lobe shows features suspicious for a single hepatic metastasis,      particularly given history of malignancy.4/22/20 Cycle 10 day 15 held due to     increased productive cough. 10 days of antibiotics prescribed.            HPI - Oncology Interim      Patient returns for ongoing treatment of his metastatic esophageal cancer.  He     is currently on single agent Cyramza.  Taxotere was stopped due to worsening     neuropathy.  He states that his feet are still numb but he is able to ambulate.     He does use a walker for stability.  He denies any masses lymphadenopathy.  He     has longstanding rheumatoid arthritis and recently had his methotrexate     increased by his rheumatologist.  It has not yet helped.  He states that he is     swallowing without difficulty.  He is eating and drinking normally, his weight     is maintained.  His energy level is adequate for his daily needs.  Denies any     issues from his Port-A-Cath.            Cancer Details            Adenocarcinoma of gastroesophageal junction            Metastatic Sites      Liver            Clinical Staging      Stage IV            Treatments      Chemotherapy      3/6/19-4/17/17 completed 4C FOLFOX  progression  5/22/19-7/3/19 completed 3C     Keytruda   progression  Cyramza/Taxol initiated 8/7/19            Clinical Trial Participant      No            ECOG Performance Status      0            Most Recent Lab Findings      Laboratory Tests      8/12/20 08:46            8/12/20 08:52            PAST, FAMILY   Past Medical History      Past Medical History:  Arthritis, Heart Attack, Hypertension      Hematology/Oncology (M):  GI Cancer, Oral Cancer, Skin Cancer            Past Surgical History      Biopsy (LIVER), Coronary Stent, Joint Replacement, Skin Cancer Removal            Family History      Family History:  Uterine Cancer            Social History      Marital Status:        Lives independently:  Yes      Number of Children:  3      Occupation:  RETIRED            Tobacco Use      Tobacco status:  Former  smoker            Alcohol Use      Alcohol intake:  None            Substance Use      Substance use:  Denies use            REVIEW OF SYSTEMS      General:  Admits: Fatigue      Eye:  Denies Corrective Lenses      ENT:  Denies Headache      Cardiovascular:  Denies Chest Pain      Respiratory:  Denies: Cough      Gastrointestinal:  Denies Diarrhea      Genitourinary:  Denies Painful Urination      Musculoskeletal:  Admits Painful Joints      Integumentary:  Denies Rash      Neurologic:  Denies Numbness\Tingling            VITAL SIGNS AND SCORES      Pain Score      Pain Scale Used:  Numerical (JOINTS)      Pain Intensity:  5            Fatigue Score      Fatigue (0-10 scale):  4            EXAM      General Appearance:  Positive for: Alert, Cooperative;          Negative for: Acute Distress      Neck:  Positive for: Supple;          Negative for: JVD, Masses      Respiratory:  Positive for: CTAB, Normal Respiratory Effort      Chest:  Port in Place      Abdomen/Gastro:  Positive for: Normal Active Bowel Sounds, Soft;          Negative for: Distention, Hepatosplenomegaly, Tenderness      Cardiovascular:  Positive for: Peripheral Edema (2+ pitting edema bilateral     lower extremities), RRR;          Negative for: Gallop, Murmur, Rub      Psychiatric:  Positive for: Appropriate Affect, Intact Judgement      Lymphatic:  Negative for: Cervical, Infraclavicular, Supraclavicular            PREVENTION      Hx Influenza Vaccination:  Yes      Date Influenza Vaccine Given:  Nov 1, 2019      2 or More Falls in Past Year?:  No      Fall Past Year with Injury?:  No      Encouraged to follow-up with:  PCP regarding preventative exams.      Chart initiated by      TRISHA ATKINS            ALLERGY/MEDS      Allergies      Coded Allergies:             NO KNOWN DRUG ALLERGIES (Verified  Allergy, Unknown, 6/17/20)            Medications      Last Reconciled on 8/12/20 15:44 by DEBBIE TAVAREZ      Potassium Chloride (K-Tab*) 10  Meq Tablet.er      20 MEQ PO QDAY for low potassium for 30 Days, #60 TAB 3 Refills         Prov: DEBBIE TAVAREZ         8/12/20       Levothyroxine (Synthroid) 0.025 Mg      0.0125 MG PO QDAY@07, #15 TAB 0 Refills         Reported         8/12/20       Mouthwash (Magic Mouthwash) 1 Each Bottle      15 ML PO Q4H PRN for MOUTH DISCOMFORT, #300 ML 1 Refill         Prov: DEBBIE TAVAREZ         5/20/20       Irbesartan (Irbesartan) 300 Mg Tablet      300 MG PO QDAY for 30 Days, #30 TAB         Reported         1/29/20       Pyridoxine (Vitamin B6) 100 Mg Tablet      100 MG PO BID, TAB         Reported         11/13/19       DULoxetine (Cymbalta) 20 Mg Capsule.dr      20 MG PO QDAY for 30 Days, #30 CAP 5 Refills         Prov: DEBBIE TAVAREZ         11/6/19       Methotrexate Sodium (Methotrexate) 2.5 Mg Tab      12.5 MG PO Watson, TAB         Reported         11/6/19       (herbs)   No Conflict Check               Reported         10/2/19       Chlorthalidone (CHLORTHALIDONE) 25 Mg Tablet      12.5 MG PO QDAY for 30 Days, #15 TAB         Reported         10/2/19       Telmisartan (Micardis) 20 Mg Tab      20 MG PO QDAY, TAB         Reported         7/31/19       Allopurinol (Allopurinol) 300 Mg Tablet      300 MG PO QDAY, #30 TAB 0 Refills         Reported         6/12/19       Ubidecarenone (Co Q-10) 1 Each Capsule      100 MG PO QDAY, CAP         Reported         5/15/19       Folic Acid (Folic Acid) 1 Mg Tablet      1 MG PO QDAY, #30 TAB 0 Refills         Reported         5/1/19       Lidocaine/Prilocaine (Emla) 30 Gm Cream..g.      1 APL TOPICAL ONCE for apply to port site w/chemo, #2 GM 6 Refills         Prov: RUBA JUNE         2/26/19       Prochlorperazine Maleate (Prochlorperazine Maleate) 10 Mg Tab      10 MG PO Q6H PRN for NAUSEA AND/OR VOMITING for 30 Days, #120 TAB 3 Refills         Prov: DEBBIE TAVAREZ         2/13/19       Ondansetron Hcl (ONDANSETRON HCL) 4 Mg Tablet      8 MG PO Q8H PRN for NAUSEA AND/OR  VOMITING for 30 Days, #180 TAB 3 Refills         Prov: DEBBIE TAVAREZ         19       Aspirin Chew (Aspirin Baby) 81 Mg Tab.chew      81 MG PO QDAY, #30 TAB.CHEW 0 Refills         Reported         19       Furosemide* (Lasix*) 40 Mg Tablet      60 MG PO QDAY, #30 TAB 0 Refills         Reported         19       Metoprolol Tartrate (Metoprolol Tartrate) 25 Mg Tablet      25 MG PO BID, #60 TAB 0 Refills         Reported         19       prednisoLONE (prednisoLONE) 5 Mg Tab      5 MG PO QDAY, TAB         Reported         19       Pantoprazole (Protonix) 20 Mg Tablet      40 MG PO BIDAC, #120 TAB 0 Refills         Reported         19      Medications Reviewed:  No Changes made to meds            IMPRESSION/PLAN      Diagnosis      Malignant neoplasm of lower third of esophagus - C15.5            Metastasis from esophageal cancer - C79.9, C15.9            Notes      Metastatic.  Patient is now on single agent Cyramza.  Tolerating well.  His lab     work today including urine for protein all look fine.  Proceed with cycle as     planned.  I will see him back for his next cycle with labs and restaging scans     prior.      New Medications      * Levothyroxine (Synthroid) 0.025 M.0125 MG PO QDAY@07 #15      Renewed Medications      * POTASSIUM CHLORIDE (K-Tab*) 10 MEQ TABLET.ER: 20 MEQ PO QDAY low potassium 30       Days #60      New Diagnostics      * CT Abd/Pelvis/Chest W/Contrast, 2 Week         Dx: Malignant neoplasm of lower third of esophagus - C15.5      * CBC With Auto Diff, 2 Week         Dx: Malignant neoplasm of lower third of esophagus - C15.5      * CMP Comp Metabolic Panel, 2 Week         Dx: Malignant neoplasm of lower third of esophagus - C15.5      * Tsh (Ultra-Sensitive, 2 Week         Dx: Malignant neoplasm of lower third of esophagus - C15.5            Patient Education            Eat Well, Exercise Well, Be Well: Dietary and Fitness Guidelines      Patient Education  Provided:  Yes            Electronically signed by DEBBIE TAVAREZ  08/12/2020 15:44       Disclaimer: Converted document may not contain table formatting or lab diagrams. Please see [employer] for the authenticated document.

## 2021-05-28 NOTE — PROGRESS NOTES
Patient: COLLEEN CONNOLLY     Acct: [id number]     Report: #[id number]  UNIT #: [id number]     : 1950    Encounter Date:2019  PRIMARY CARE: SATISH CASTRO  ***Signed***  --------------------------------------------------------------------------------------------------------------------  NURSE INTAKE      Visit Type      Established Patient Visit            Chief Complaint      ESOPHAGEAL CA METS HERE FOR TREATMENT.      Intent of Therapy:  Palliative            Referring Provider/Copies To      Provider Not Found in Lookup:  DANA RODRIGUEZ      Primary Care Provider:  SATISH CASTRO            History and Present Illness      Past Oncology Illness History      Mr. Connolly is a 68-year-old white male who presents today for evaluation and     discussion of treatment options for his recently diagnosed esophageal cancer.      Patient states that this was initially identified when he was noted to be iron     deficient by his rheumatologist who was doing routine blood work for follow-up     of his methotrexate and rheumatoid arthritis treatment.  He underwent     colonoscopy which was benign as well as EGD at his local hospital that showed a     lesion in the distal esophagus.  He was seen by surgical oncology at [facility], Dr. Do.  Repeat EGD confirmed lesion in the lower     esophagus/GE junction area.  Biopsies showed marked atypia.  Surgery was     considered but patient and family desired a second opinion.  They were then seen    by CT surgery at [facility], Dr. Girard who repeated the     testing and this time biopsy was positive for adenocarcinoma.  He underwent     staging endoscopic ultrasound which identified a lesion in the distal esophagus,    GE junction, cardia of the stomach.  A cardia lymph node was also positive on     FNA giving him N1 disease.  His [place] physicians have recommended     neoadjuvant concurrent chemo RT followed by resection  based on the trial CROSS     trial.  Patient states that he has had no issues with swallowing, dysphagia,     stomach or chest pain, weight loss, nausea or vomiting.  He continues to take     methotrexate for his rheumatoid arthritis and has considerable difficulties     especially with his hands.  He reports that he has recently completed     intravenous iron therapy.      Upon simulation for esophageal radiation-an area was found on the liver--right     lobe of liver.  MRI liver showed 9 mm lesion in the inferior right hepatic lobe     shows features suspicious for a single hepatic metastasis, particularly given     history of malignancy.       Started on palliative FOLFOX            HPI - Oncology Interim      F/u metastatic esophageal ca-due for #4 FOLFOX today-pt reports doing     well--tolerating tx w/minimal side effects.  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  He is eating well w/o n/v-mi    nimal noted around 3rd/4th day; however, adequately managed w/meds.  He has     noted inc of joint pain/achiness.  He denies fever, SOA or distress at this     time.            Cancer Details            Adenocarcinoma of gastroesophageal junction            Metastatic Sites      Liver            Clinical Staging      Stage IV            Treatments      Chemotherapy      3/6/19 mFOLFOX initiated            Clinical Trial Participant      No            ECOG Performance Status      0            Most Recent Lab Findings      Laboratory Tests      4/3/19 08:45            4/17/19 10:02            Laboratory Tests            Test       4/3/19      08:45             Magnesium Level       1.95 mg/dL      (1.60-2.30)            PAST, FAMILY   Past Medical History      Past Medical History:  Arthritis, Heart Attack, Hypertension      Hematology/Oncology (M):  GI Cancer (ESOPHAAGEAL CA), Oral Cancer (ESOPHAGEAL     CA), Skin Cancer            Past Surgical History      Biopsy (X 4),  Coronary Stent, Joint Replacement, Skin Cancer Removal            Family History      Family History:  Uterine Cancer (MOTHER)            Social History      Marital Status:        Lives independently:  Yes      Number of Children:  3      Occupation:  RETIRED            Tobacco Use      Tobacco status:  Former smoker            Alcohol Use      Alcohol intake:  None            Substance Use      Substance use:  Denies use            REVIEW OF SYSTEMS      General:  Denies: Appetite Change, Fatigue, Fever, Night Sweats, Weight Gain,     Weight Loss      Eye:  Denies: Blurred Vision, Corrective Lenses, Diplopia, Eye Irritation, Eye     Pain, Eye Redness, Spots in Vision, Vision Loss      ENT:  Denies: Headache, Hearing Loss, Hoarseness, Seizures, Sinus Congestion,     Sore Throat      Cardiovascular:  Denies: Chest Pain, Edema Ankles, Edema Legs, Irregular Hea    rtbeat, Palpitations      Respiratory:  Denies: Coughing Blood, Productive Cough, Shortness of Air,     Wheezing      Gastrointestinal:  Denies: Bloody Stools, Constipation, Diarrhea, Frequent     Heartburn, Nausea, Problem Swallowing, Tarry Stools, Unable to Control Bowels,     Vomiting      Genitourinary (male):  Denies: Blood in Urine, Decrease Urine Stream, Frequent     Urination, Incontinence, Painful Urination      Musculoskeletal:  Admits: Painful Joints;          Denies: Back Pain, Leg Cramps, Muscle Pain, Muscle Weakness, Swollen Joints      Integumentary:  Denies: Bleeds Easily, Bruises Easily, Hair Changes, Jaundice,     Lesions, Mole Changes, Nail Changes, Pigment Changes, Rash, Skin Discoloration      Neurologic:  Denies: Dizziness, Fainting, Numbness\Tingling, Paralysis, Seizures      Psychiatric:  Denies: Anxiety, Confused, Depression, Disoriented, Memory Loss      Endocrine:  Denies: Cold Intolerance, Diabetes, Excessive Sweating, Excessive     Thirst, Excessive Urination, Heat Intolerance, Flushing, Hyperthyroidism,     Hypothyroidism       Hematologic/Lymphatic:  Denies: Bruising, Bleeding, Enlarged Lymph Nodes,     Recurrent Infections, Transfusions            VITAL SIGNS AND SCORES      Vitals      Height 5 ft 9 in / 175.26 cm      Weight 268 lbs 1.270 oz / 121.6 kg      BSA 2.34 m2      BMI 39.6 kg/m2      Temperature 98.3 F / 36.83 C - Temporal      Pulse 70      Respirations 18      Blood Pressure 162/80 Sitting      Pulse Oximetry 98%, RM AIR            Pain Score      Pain Scale Used:  Numerical      Pain Intensity:  0            Fatigue Score      Fatigue (0-10 scale):  4            EXAM      General Appearance:  Positive for: Alert, Oriented x3, Cooperative;          Negative for: Acute Distress      Eye:  Positive for: Anicteric Sclerae, Moist Conjunctiva      HEENT:  Positive for: Oropharynx clear      Neck:  Positive for: Supple;          Negative for: JVD, Masses      Respiratory:  Positive for: CTAB, Normal Respiratory Effort      Chest:  Port in Place      Abdomen/Gastro:  Positive for: Normal Active Bowel Sounds, Soft;          Negative for: Distention, Hepatosplenomegaly, Tenderness      Cardiovascular:  Positive for: RRR;          Negative for: Gallop, Murmur, Peripheral Edema, Rub      Psychiatric:  Positive for: Appropriate Affect, Intact Judgement            PREVENTION      Hx Influenza Vaccination:  Yes      Date Influenza Vaccine Given:  Dec 1, 2018      2 or More Falls Past Year?:  No      Fall Past Year with Injury?:  No      Encouraged to follow-up with:  PCP regarding preventative exams.      Chart initiated by      DIANA COSME MA            ALLERGY/MEDS      Allergies      Coded Allergies:             NO KNOWN DRUG ALLERGIES (Verified  Allergy, Unknown, 4/17/19)            Medications      Last Reconciled on 4/17/19 13:13 by YADI JC      Lidocaine/Prilocaine (Emla) 30 Gm Cream..g.      1 APL TOPICAL ONCE for apply to port site w/chemo, #2 GM 6 Refills         Prov: RUBA JUNE         2/26/19        Prochlorperazine Maleate (Prochlorperazine Maleate) 10 Mg Tab      10 MG PO Q6H PRN for NAUSEA AND/OR VOMITING for 30 Days, #120 TAB 3 Refills         Prov: DEBBIE TAVAREZ         2/13/19       Ondansetron Hcl (ONDANSETRON HCL) 4 Mg Tablet      8 MG PO Q8H PRN for NAUSEA AND/OR VOMITING for 30 Days, #180 TAB 3 Refills         Prov: DEBBIE TAVAREZ         2/13/19       Aspirin Chew (Aspirin Baby) 81 Mg Tab.chew      81 MG PO QDAY, #30 TAB.CHEW 0 Refills         Reported         2/13/19       Allopurinol (Zyloprim*) 100 Mg Tablet      100 MG PO BID for 30 Days, #60 TAB 0 Refills         Reported         2/13/19       Furosemide* (Lasix*) 40 Mg Tablet      40 MG PO QDAY, #30 TAB 0 Refills         Reported         2/13/19       Pravastatin Sod (Pravastatin*) 40 Mg Tablet      80 MG PO QDAY, #60 TAB 0 Refills         Reported         2/13/19       Metoprolol Tartrate (Metoprolol Tartrate*) 25 Mg Tablet      25 MG PO BID, #60 TAB 0 Refills         Reported         2/13/19       Prednisolone* (Prednisolone*) 5 Mg Tab      5 MG PO QDAY, TAB         Reported         2/13/19       Telmisartan (Micardis*) 40 Mg Tablet      80 MG PO QDAY, TAB         Reported         2/13/19       Pantoprazole (Protonix*) 20 Mg Tablet      40 MG PO BIDAC, #120 TAB 0 Refills         Reported         2/13/19      Medications Reviewed:  No Changes made to meds            IMPRESSION/PLAN      Impression      Esophageal cancer, metastatic            Diagnosis      Metastasis from esophageal cancer - C79.9, C15.9      Patient is on treatment with FOLFOX.  Tolerating very well.  He has minimal     neuropathy related to cold exposure but this resolves after couple of days.  Lab    work demonstrates an elevated white blood cell count related to the use of     Neulasta as well as mild anemia.  Otherwise his labs look good.  Proceed with     cycle 4 as planned.  RTC 2 weeks for OV, cycle 5 with labs and restaging scans     prior.  Recommended long-acting  Tylenol as needed for achiness related to     Neulasta use.      New Diagnostics      * CT Abd/Pelvis/Chest W/Contrast, SCHEDULED PROCEDURE            Patient Education            Acetaminophen      Cancer Pain Syndromes      Patient Education Provided:  Yes                 Disclaimer: Converted document may not contain table formatting or lab diagrams. Please see [employer] for the authenticated document.

## 2021-05-28 NOTE — PROGRESS NOTES
Patient: COLLEEN CONNOLLY     Acct: [id number]     Report: #[id number]  UNIT #: [id number]     : 1950    Encounter Date:2019  PRIMARY CARE: SATISH CASTRO  ***Signed***  --------------------------------------------------------------------------------------------------------------------  NURSE INTAKE      Visit Type      Established Patient Visit            Chief Complaint      tx            Referring Provider/Copies To      Primary Care Provider:  SATISH CASTRO            History and Present Illness      Past Oncology Illness History      Mr. Connolly is a 68-year-old white male who presents today for evaluation and     discussion of treatment options for his recently diagnosed esophageal cancer.      Patient states that this was initially identified when he was noted to be iron     deficient by his rheumatologist who was doing routine blood work for follow-up     of his methotrexate and rheumatoid arthritis treatment.  He underwent     colonoscopy which was benign as well as EGD at his local hospital that showed a     lesion in the distal esophagus.  He was seen by surgical oncology at [facility], Dr. Do.  Repeat EGD confirmed lesion in the lower     esophagus/GE junction area.  Biopsies showed marked atypia.  Surgery was     considered but patient and family desired a second opinion.  They were then seen    by CT surgery at [facility], Dr. Girard who repeated the     testing and this time biopsy was positive for adenocarcinoma.  He underwent     staging endoscopic ultrasound which identified a lesion in the distal esophagus,    GE junction, cardia of the stomach.  A cardia lymph node was also positive on     FNA giving him N1 disease.  His [facility] physicians have recommended     neoadjuvant concurrent chemo RT followed by resection based on the trial CROSS     trial.  Patient states that he has had no issues with swallowing, dysphagia,     stomach or chest  pain, weight loss, nausea or vomiting.  He continues to take     methotrexate for his rheumatoid arthritis and has considerable difficulties     especially with his hands.  He reports that he has recently completed     intravenous iron therapy.      Upon simulation for esophageal radiation-an area was found on the liver--right     lobe of liver.  MRI liver showed 9 mm lesion in the inferior right hepatic lobe     shows features suspicious for a single hepatic metastasis, particularly given     history of malignancy.       Started on palliative FOLFOX            HPI - Oncology Interim      Patient returns today for consideration of cycle 5 of palliative chemotherapy     for his metastatic GE junction adenocarcinoma.  He is on Taxotere and Cyramza.      He states he is tolerating his chemotherapy very well, in fact he feels better     on the days that he receives chemo.  He denies swallowing issues, dysphagia,     nausea or vomiting.  He denies any unusual aches or pains.  He states his     rheumatoid arthritis is typically better on his chemotherapy treatments as well.     He denies any masses lymphadenopathy.  No unusual aches or pains.  He does have    neuropathy in his feet and hands.  At his last visit we discussed vitamin B6 and    starting Cymbalta.  He picked up both but only started the B6.  He does feel     like his neuropathy is slightly better.  He did not try the Cymbalta as yet.            Cancer Details            Adenocarcinoma of gastroesophageal junction            Metastatic Sites      Liver            Clinical Staging      Stage IV            Treatments      Chemotherapy      3/6/19-4/17/17 completed 4C FOLFOX  progression  5/22/19-7/3/19 completed 3C     Keytruda   progression  Cyramza/Taxol initiated 8/7/19            Clinical Trial Participant      No            ECOG Performance Status      0            Most Recent Lab Findings      Laboratory Tests      11/27/19 08:49            Most Recent  Imaging Findings      Patient: COLLEEN BRAGG   Acct: #[id number]   Report: #[id number]            UNIT #: [id number]    DOS: 19 1107      INSURANCE:MEDICARE PART A   LOCATION:CT     : 1950            PROVIDERS      ADMITTING:     ATTENDING: DEBBIE TAVAREZ      FAMILY:  SATISH CASTRO   ORDERING:  DEBBIE TAVAREZ         OTHER:    DICTATING:  Demetrio Aquino MD            REQ #:[id number]   EXAM:CTACPWC - CT ABD CHEST PEL w CONTR      REASON FOR EXAM:  ESOPHOGEAL,C79.9      REASON FOR VISIT:  ESOPHOGEAL,C79.9            *******Signed******         PROCEDURE:   CT ABDOMEN; CT CHEST; CT PELVIS WITH CONTRAST             COMPARISON:   Other, CT, CHEST W/ CONTRAST, 10/29/2018, 17:49.  [facility], CT, CT       CHEST ABD PEL W CONTRAST, 2019, 11:34.             INDICATIONS:   ESOPHAGEAL CA, LIVER CA             TECHNIQUE:   After obtaining the patient's consent, CT images were obtained with    intravenous contrast       material.      PROTOCOL:     Standard imaging protocol performed                RADIATION:     DLP: 2693mGy*cm          Automated exposure control was utilized to minimize radiation dose.       CONTRAST:   100cc Isovue 370 I.V.      LABS:     eGFR: >60ml/min/1.73m2             FINDINGS:         Chest:             There is no mediastinal, hilar, or axillary adenopathy.  No pleural effusions     are seen.  No       discrete esophageal mass identified.  No evidence of esophageal obstruction.      There are calcified       mediastinal lymph nodes consistent with changes of prior granulomatous disease.     There is a small       area of pleural thickening along the minor fissure within the right middle lobe.     This is unchanged       from 10/29/2018.  There are no new noncalcified pulmonary nodules identified.      No focal airspace       consolidation is seen.             Abdomen:             Within the medial segment of the left lobe of the liver there is a stable  1.2 cm    low-density mass ,       image number 32 consistent with metastatic disease.  There is a stable 1 cm     lesion within the right       lobe of the liver image number 30. No definite new or enlarging liver lesions     identified.  Multiple       calcified stones are seen within the gallbladder.  There is no inflammatory     change around the       gallbladder.  There is no evidence of biliary tract obstruction.  Pancreas and     spleen are within       normal limits.  Bilateral adrenal glands appear normal.  Kidneys appear within     normal limits       bilaterally.  There is no evidence of hydronephrosis.  There is a 2 mm non    obstructing stone within       the lower pole the right kidney.  There is no abdominal or retroperitoneal     adenopathy.  The upper       GI tract is within normal limits.             Pelvis:             Urinary bladder appears within normal limits.  GI tract including the appendix     is normal.  There is       no pelvic or inguinal adenopathy.  No free intraperitoneal fluid is seen.             Bones:  There are degenerative changes of the hips and spine.  There are     bilateral L5 pars defects       with 1.5 cm anterior listhesis of L5 on S1.  This is unchanged.  There are no     suspicious lytic or       sclerotic bony lesions identified.             CONCLUSION:         1. No acute process seen within the chest.  No new or enlarging noncalcified     pulmonary nodules.      2. Stable low-density masses within the liver compatible with known metastatic     disease.  No new or       enlarging liver lesions identified.      3. Nonobstructing 2 mm stone within the lower pole the right kidney.  No     evidence of       hydronephrosis.      4. Cholelithiasis but no CT evidence of acute cholecystitis or biliary tract     obstruction.              DIANA REYNOLDS MD             Electronically Signed and Approved By: DIANA REYNOLDS MD on 11/26/2019 at 13:31                            Until signed, this is an unconfirmed preliminary report that may contain      errors and is subject to change.                                              STEBA:      D:11/26/19 1331            PAST, FAMILY   Past Medical History      Past Medical History:  Arthritis, Heart Attack, Hypertension      Hematology/Oncology (M):  GI Cancer, Oral Cancer, Skin Cancer            Past Surgical History      Biopsy (LIVER), Coronary Stent, Joint Replacement, Skin Cancer Removal            Family History      Family History:  Uterine Cancer            Social History      Marital Status:        Lives independently:  Yes      Number of Children:  3      Occupation:  RETIRED            Tobacco Use      Tobacco status:  Former smoker            Alcohol Use      Alcohol intake:  None            Substance Use      Substance use:  Denies use            REVIEW OF SYSTEMS      General:  Admits: Fatigue, Weight Loss      Eye:  Admits Corrective Lenses      ENT:  Admits Hearing Loss      Respiratory:  Denies: Productive Cough, Shortness of Air      Gastrointestinal:  Denies Diarrhea, Denies Nausea/Vomiting      Musculoskeletal:  Denies Muscle Pain, Denies Painful Joints      Neurologic:  Denies Dizziness; Admits Numbness\Tingling (FEET AND LEFT THUMB)            VITAL SIGNS AND SCORES      Vitals      Weight 249 lbs 12.499 oz / 113.3 kg      Temperature 98.8 F / 37.11 C - Temporal      Pulse 64      Respirations 20      Blood Pressure 145/83 Sitting      Pulse Oximetry 95%, RM AIR            Pain Score      Pain Scale Used:  Numerical      Pain Intensity:  0            Fatigue Score      Fatigue (0-10 scale):  3            EXAM      General Appearance:  Positive for: Alert, Oriented x3, Cooperative;          Negative for: Acute Distress      Eye:  Positive for: Anicteric Sclerae, Moist Conjunctiva      Neck:  Positive for: Supple;          Negative for: JVD, Masses      Respiratory:  Positive for: CTAB, Normal Respiratory Effort       Chest:  Port in Place      Abdomen/Gastro:  Positive for: Normal Active Bowel Sounds, Soft;          Negative for: Distention, Hepatosplenomegaly, Tenderness      Cardiovascular:  Positive for: RRR;          Negative for: Gallop, Murmur, Peripheral Edema, Rub      Psychiatric:  Positive for: Appropriate Affect, Intact Judgement      Lymphatic:  Negative for: Cervical, Infraclavicular, Supraclavicular            PREVENTION      Hx Influenza Vaccination:  Yes      Date Influenza Vaccine Given:  Nov 1, 2019      2 or More Falls Past Year?:  No      Fall Past Year with Injury?:  No      Encouraged to follow-up with:  PCP regarding preventative exams.      Chart initiated by      DIANA COSME MA            ALLERGY/MEDS      Allergies      Coded Allergies:             NO KNOWN DRUG ALLERGIES (Verified  Allergy, Unknown, 11/27/19)            Medications      Last Reconciled on 11/27/19 09:41 by DEBBIE TAVAREZ      Pyridoxine (Vitamin B6) 100 Mg Tablet      100 MG PO BID, TAB         Reported         11/13/19       DULoxetine (Cymbalta) 20 Mg Capsule.dr      20 MG PO QDAY for 30 Days, #30 CAP 5 Refills         Prov: DEBBIE TAVAREZ         11/6/19       Pravastatin Sod (Pravastatin*) 40 Mg Tablet      40 MG PO QDAY, #30 TAB 0 Refills         Reported         11/6/19       Methotrexate Sodium (Methotrexate) 2.5 Mg Tab      12.5 MG PO Watson, TAB         Reported         11/6/19       Potassium Chloride (K-Tab*) 10 Meq Tablet.er      20 MEQ PO QDAY for low potassium for 30 Days, #60 TAB 3 Refills         Prov: DEBBIE TAVAREZ         10/2/19       (herbs)   No Conflict Check               Reported         10/2/19       Chlorthalidone (CHLORTHALIDONE) 25 Mg Tablet      12.5 MG PO QDAY for 30 Days, #15 TAB         Reported         10/2/19       PACLitaxel (taxOL) 6 Mg/1 Ml Vial      0 IV D 1, 8      Reported         7/31/19       (Cyramza)   No Conflict Check      IV D 1      Reported         7/31/19       Telmisartan  (Micardis) 20 Mg Tab      20 MG PO QDAY, TAB         Reported         7/31/19       Allopurinol (Allopurinol) 300 Mg Tablet      300 MG PO QDAY, #30 TAB 0 Refills         Reported         6/12/19       Ubidecarenone (Co Q-10) 1 Each Capsule      100 MG PO QDAY, CAP         Reported         5/15/19       Folic Acid (Folic Acid) 1 Mg Tablet      1 MG PO QDAY, #30 TAB 0 Refills         Reported         5/1/19       Lidocaine/Prilocaine (Emla) 30 Gm Cream..g.      1 APL TOPICAL ONCE for apply to port site w/chemo, #2 GM 6 Refills         Prov: RUBA JUNE         2/26/19       Prochlorperazine Maleate (Prochlorperazine Maleate) 10 Mg Tab      10 MG PO Q6H PRN for NAUSEA AND/OR VOMITING for 30 Days, #120 TAB 3 Refills         Prov: DEBBIE TAVAREZ         2/13/19       Ondansetron Hcl (ONDANSETRON HCL) 4 Mg Tablet      8 MG PO Q8H PRN for NAUSEA AND/OR VOMITING for 30 Days, #180 TAB 3 Refills         Prov: DEBBIE TAVAREZ         2/13/19       Aspirin Chew (Aspirin Baby) 81 Mg Tab.chew      81 MG PO QDAY, #30 TAB.CHEW 0 Refills         Reported         2/13/19       Furosemide* (Lasix*) 40 Mg Tablet      40 MG PO QDAY, #30 TAB 0 Refills         Reported         2/13/19       Metoprolol Tartrate (Metoprolol Tartrate) 25 Mg Tablet      25 MG PO BID, #60 TAB 0 Refills         Reported         2/13/19       Prednisolone* (Prednisolone*) 5 Mg Tab      5 MG PO QDAY, TAB         Reported         2/13/19       Pantoprazole (Protonix*) 20 Mg Tablet      40 MG PO BIDAC, #120 TAB 0 Refills         Reported         2/13/19      Medications Reviewed:  No Changes made to meds            IMPRESSION/PLAN      Diagnosis      Malignant neoplasm of lower third of esophagus - C15.5      Patient is on palliative treatment with Taxotere and Cyramza.  He is due for     cycle 5.  Restaging scans continue to demonstrate small hepatic lesions but     stable compared to previous.  Overall improved from initiating therapy.      Clinically he is  feeling better.  Lab work today looks good.  Proceed with cycle     5 as planned.  I will see him back for cycle 6, day 1 with labs prior.            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  We discussed     Cymbalta but he will hold off on that for the time being.  Reassess next visit            Patient Education            Eating Well While Receiving Chemotherapy      Patient Education Provided:  Yes            Electronically signed by DEBBIE TAVAREZ  11/27/2019 09:41       Disclaimer: Converted document may not contain table formatting or lab diagrams. Please see Pager System for the authenticated document.

## 2021-05-28 NOTE — PROGRESS NOTES
Patient: COLLEEN BRAGG     Acct: [id number]     Report: #[id number]  UNIT #: [id number]     : 1950    Encounter Date:2019  PRIMARY CARE: SATISH CASTRO  ***Signed***  --------------------------------------------------------------------------------------------------------------------  NURSE INTAKE      Visit Type      New Patient Visit            Chief Complaint      esophageal ca      Intent of Therapy:  Curative            Referring Provider/Copies To      Provider Not Found in Lookup:  DANA RODRIGUEZ      Primary Care Provider:  SATISH CASTRO            History and Present Illness      HPI - Oncology Interim      Patient is a 68-year-old white male who presents today for evaluation and discu    ssion of treatment options for his recently diagnosed esophageal cancer.      Patient states that this was initially identified when he was noted to be iron     deficient by his rheumatologist who was doing routine blood work for follow-up     of his methotrexate and rheumatoid arthritis treatment.  He underwent     colonoscopy which was benign as well as EGD at his local hospital that showed a     lesion in the distal esophagus.  He was seen by surgical oncology at [facility], Dr. Do.  Repeat EGD confirmed lesion in the lower     esophagus/GE junction area.  Biopsies showed marked atypia.  Surgery was     considered but patient and family desired a second opinion.  They were then seen    by CT surgery at [facility], Dr. Girard who repeated the     testing and this time biopsy was positive for adenocarcinoma.  He underwent     staging endoscopic ultrasound which identified a lesion in the distal esophagus,    GE junction, cardia of the stomach.  A cardia lymph node was also positive on     FNA giving him N1 disease.  His [place] physicians have recommended     neoadjuvant concurrent chemo RT followed by resection based on the trial CROSS     trial.  Patient  states that he has had no issues with swallowing, dysphagia,     stomach or chest pain, weight loss, nausea or vomiting.  He continues to take     methotrexate for his rheumatoid arthritis and has considerable difficulties     especially with his hands.  He reports that he has recently completed     intravenous iron therapy.            Cancer Details            GE junction.  Adenocarcinoma.            Clinical Staging      T3, N1, M0 = stage IIIb            Clinical Trial Participant      No            ECOG Performance Status      0            PAST, FAMILY   Past Medical History      Past Medical History:  Arthritis, Heart Attack, Hypertension      Hematology/Oncology (M):  Skin Cancer      Other Hematology/Oncology      ESOPHAGEAL CA            Past Surgical History      Biopsy (X 4), Coronary Stent, Joint Replacement, Skin Cancer Removal            Family History      Family History:  Uterine Cancer (MOTHER)      Father  from heart disease.  Multiple siblings with coronary artery     disease.  No other cancer in the family which he is aware            Social History      Marital Status:        Lives independently:  Yes      Number of Children:  3      Occupation:  RETIRED            Tobacco Use      Tobacco status:  Former smoker            Alcohol Use      Alcohol intake:  None            Substance Use      Substance use:  Denies use            REVIEW OF SYSTEMS      General:  Denies: Appetite Change, Fatigue, Fever, Night Sweats, Weight Gain,     Weight Loss      Eye:  Denies: Blurred Vision, Corrective Lenses, Diplopia, Eye Irritation, Eye     Pain, Eye Redness, Spots in Vision, Vision Loss      ENT:  Denies: Headache, Hearing Loss, Hoarseness, Seizures, Sinus Congestion,     Sore Throat      Cardiovascular:  Denies: Chest Pain, Edema Ankles, Edema Legs, Irregular     Heartbeat, Palpitations      Respiratory:  Denies: Coughing Blood, Productive Cough, Shortness of Air,     Wheezing       Gastrointestinal:  Denies: Bloody Stools, Constipation, Diarrhea, Frequent     Heartburn, Nausea, Problem Swallowing, Tarry Stools, Unable to Control Bowels,     Vomiting      Genitourinary (male):  Denies: Blood in Urine, Decrease Urine Stream, Frequent     Urination, Incontinence, Painful Urination      Musculoskeletal:  Admits: Painful Joints, Swollen Joints;          Denies: Back Pain, Leg Cramps, Muscle Pain, Muscle Weakness      Integumentary:  Denies: Bleeds Easily, Bruises Easily, Hair Changes, Jaundice,     Lesions, Mole Changes, Nail Changes, Pigment Changes, Rash, Skin Discoloration      Neurologic:  Denies: Dizziness, Fainting, Numbness\Tingling, Paralysis, Seizures      Psychiatric:  Denies: Anxiety, Confused, Depression, Disoriented, Memory Loss      Endocrine:  Denies: Cold Intolerance, Diabetes, Excessive Sweating, Excessive     Thirst, Excessive Urination, Heat Intolerance, Flushing, Hyperthyroidism,     Hypothyroidism            VITAL SIGNS AND SCORES      Vitals      Height 5 ft 9.68 in / 177 cm      Weight 265 lbs 6.941 oz / 120.4 kg      BSA 2.35 m2      BMI 38.4 kg/m2      Temperature 99.5 F / 37.5 C - Temporal      Pulse 69      Respirations 18      Blood Pressure 170/94 Sitting, Left Arm      Pulse Oximetry 96%, RM AIR            Pain Score      Pain Scale Used:  Numerical      Pain Intensity:  5            Fatigue Score      Fatigue (0-10 scale):  0 (none)            EXAM      General Appearance:  Positive for: Alert, Oriented x3, Cooperative;          Negative for: Acute Distress      Eye:  Positive for: Anicteric Sclerae, Moist Conjunctiva      HEENT:  Positive for: Oropharynx clear;          Negative for: Erythema, Exudates      Neck:  Positive for: Supple;          Negative for: JVD, Masses      Respiratory:  Positive for: CTAB, Normal Respiratory Effort      Abdomen/Gastro:  Positive for: Normal Active Bowel Sounds, Soft;          Negative for: Distention, Hepatosplenomegaly,  Tenderness      Cardiovascular:  Positive for: RRR;          Negative for: Gallop, Murmur, Peripheral Edema, Rub      Psychiatric:  Positive for: Appropriate Affect, Intact Judgement      Other      Multiple joints of the hands  including MCP and phalanges with active synovitis,    erythema and induration      Lymphatic:  Negative for: Axillary, Cervical, Infraclavicular, Supraclavicular            PREVENTION      Hx Influenza Vaccination:  Yes      Date Influenza Vaccine Given:  Dec 1, 2018      2 or More Falls Past Year?:  No      Fall Past Year with Injury?:  No      Encouraged to follow-up with:  PCP regarding preventative exams.      Chart initiated by      DIANA COSME MA            ALLERGY/MEDS      Allergies      Coded Allergies:             No Known Drug Allergies (Verified  Allergy, 2/13/19)            Medications            Aspirin Chew (Aspirin Baby) 81 Mg Tab.chew      81 MG PO QDAY, #30 TAB.CHEW 0 Refills         Reported         2/13/19       Methotrexate Sodium (Methotrexate) 2.5 Mg Tab      2.5 MG PO Fr, TAB         Reported         2/13/19       Allopurinol (Zyloprim*) 100 Mg Tablet      100 MG PO BID for 30 Days, #60 TAB 0 Refills         Reported         2/13/19       Furosemide* (Lasix*) 40 Mg Tablet      40 MG PO QDAY, #30 TAB 0 Refills         Reported         2/13/19       Pravastatin Sod (Pravastatin*) 40 Mg Tablet      80 MG PO QDAY, #60 TAB 0 Refills         Reported         2/13/19       amLODIPine (amLODIPine) 2.5 Mg Tablet      5 MG PO QDAY, #60 TAB 0 Refills         Reported         2/13/19       Metoprolol Tartrate (Metoprolol Tartrate*) 25 Mg Tablet      25 MG PO BID, #60 TAB 0 Refills         Reported         2/13/19       Prednisolone* (Prednisolone*) 5 Mg Tab      5 MG PO QDAY, TAB         Reported         2/13/19       Telmisartan (Micardis*) 40 Mg Tablet      80 MG PO QDAY, TAB         Reported         2/13/19       Pantoprazole (Protonix*) 20 Mg Tablet      40 MG PO BIDAC,  #120 TAB 0 Refills         Reported         2/13/19      Medications Reviewed:  Changes made to meds            IMPRESSION/PLAN      Impression      Esophageal cancer, adenocarcinoma involving the distal esophagus/GE junction     with extension into the cardia.  T3, N1, M0 = stage IIIb.  Based on endoscopic     ultrasound and biopsy results            Diagnosis      Esophageal cancer         Malignant neoplasm of lower third of esophagus - C15.5         Malignant neoplasm of esophagus location: lower third      Patient is appropriate for neoadjuvant chemo RT followed by resection.  He has     already been seen by CT surgery who are in agreement with the plan.  This would     follow the cross trial using radiation along with weekly carboplatin AUC 2 and     Taxol 50 mg/m ² for 5 weeks.  This would then be followed by resection.  Adjuvant    chemotherapy would be considered based upon pathologic response but hopefully     not necessary.  Side effects, risks and benefits discussed in detail.  Written     teaching information provided.  He will be referred to radiation oncology for     evaluation and planning.  He will need lab work today to ensure adequate end     organ functions and blood counts.  Prescriptions for Zofran and Compazine will     be provided as needed for nausea.  This office will coordinate with radiation     oncology regarding a start date.  I will plan to see him back for a week to with    labs prior.            Rheumatoid arthritis         Rheumatoid arthritis involving multiple sites, unspecified rheumatoid factor        presence - M06.9         Rheumatoid arthritis location: multiple sites         Rheumatoid factor presence: unspecified presence      Patient is on methotrexate along with prednisone.  I have counseled him that he     will need to stop the methotrexate prior to starting intravenous chemotherapy.      Prednisone is still reasonable to use during treatment and can be adjusted if he     has flareups.            Notes      New Medications      * PANTOPRAZOLE (Protonix*) 20 MG TABLET: 40 MG PO BIDAC #120         Instructions: Take on an empty stomach.      * Telmisartan (Micardis*) 40 MG TABLET: 80 MG PO QDAY      * Prednisolone* 5 MG TAB: 5 MG PO QDAY      * Metoprolol Tartrate (Metoprolol Tartrate*) 25 MG TABLET: 25 MG PO BID #60      * amLODIPine 2.5 MG TABLET: 5 MG PO QDAY #60      * Pravastatin Sod (Pravastatin*) 40 MG TABLET: 80 MG PO QDAY #60      * Furosemide* (Lasix*) 40 MG TABLET: 40 MG PO QDAY #30      * ALLOPURINOL (Zyloprim*) 100 MG TABLET: 100 MG PO BID 30 Days #60      * Methotrexate Sodium (Methotrexate) 2.5 MG TAB: 2.5 MG PO Fr      * Aspirin Chew (Aspirin Baby) 81 MG TAB.CHEW: 81 MG PO QDAY #30      * Ondansetron HCl (Zofran*) 4 MG TABLET: 8 MG PO Q8H PRN NAUSEA AND/OR VOMITING       30 Days #180         Dx: Esophageal cancer - C15.9      * Prochlorperazine Maleate 10 MG TAB: 10 MG PO Q6H PRN NAUSEA AND/OR VOMITING 30      Days #120         Dx: Esophageal cancer - C15.9      New Diagnostics      * CBC With Auto Diff, Routine         Dx: Esophageal cancer - C15.9      * CMP Comp Metabolic Panel, Routine         Dx: Esophageal cancer - C15.9      New Referrals      * Radiation Therapy, As Soon As Possible         William Lira         REFERRED TO DR. LIRA         Dx: Esophageal cancer - C15.9      * Diet Non Diabetic, As Soon As Possible         Reason for Referral: see patient in radiation         Dx: Esophageal cancer - C15.9            Patient Education            Carboplatin Injection      Paclitaxel Injection      Patient Education Provided:  Yes                 Disclaimer: Converted document may not contain table formatting or lab diagrams. Please see [employer] for the authenticated document.

## 2021-05-28 NOTE — PROGRESS NOTES
Patient: COLLEEN CONNOLLY     Acct: [id number]     Report: #[id number]  UNIT #: [id number]     : 1950    Encounter Date:2020  PRIMARY CARE: SATISH CASTRO  ***Signed***  --------------------------------------------------------------------------------------------------------------------  NURSE INTAKE      Visit Type      Established Patient Visit            Chief Complaint      CHEMO TX            Referring Provider/Copies To      Primary Care Provider:  SATISH CASTRO      Copies To:   SATISH CASTRO            History and Present Illness      Past Oncology Illness History      Mr. Connolly is a 68-year-old white male who presents today for evaluation and     discussion of treatment options for his recently diagnosed esophageal cancer.      Patient states that this was initially identified when he was noted to be iron     deficient by his rheumatologist who was doing routine blood work for follow-up     of his methotrexate and rheumatoid arthritis treatment.  He underwent     colonoscopy which was benign as well as EGD at his local hospital that showed a     lesion in the distal esophagus.  He was seen by surgical oncology at [facility], Dr. Do.  Repeat EGD confirmed lesion in the lower     esophagus/GE junction area.  Biopsies showed marked atypia.  Surgery was     considered but patient and family desired a second opinion.  They were then seen    by CT surgery at [facility], Dr. Girard who repeated the     testing and this time biopsy was positive for adenocarcinoma.  He underwent     staging endoscopic ultrasound which identified a lesion in the distal esophagus,    GE junction, cardia of the stomach.  A cardia lymph node was also positive on     FNA giving him N1 disease.  His [place] physicians have recommended     neoadjuvant concurrent chemo RT followed by resection based on the trial CROSS t    rial.  Patient states that he has had no issues with  swallowing, dysphagia,     stomach or chest pain, weight loss, nausea or vomiting.  He continues to take     methotrexate for his rheumatoid arthritis and has considerable difficulties     especially with his hands.  He reports that he has recently completed     intravenous iron therapy.      Upon simulation for esophageal radiation-an area was found on the liver--right     lobe of liver.  MRI liver showed 9 mm lesion in the inferior right hepatic lobe     shows features suspicious for a single hepatic metastasis, particularly given     history of malignancy.       Started on palliative FOLFOX            HPI - Oncology Interim      Patient returns for consideration of cycle 7 of palliative chemotherapy with     Cyramza and Taxol.  He states he is tolerating the chemotherapy well.  He denies    nausea, vomiting or diarrhea.  He is eating and drinking normally, his weight is    maintained.  He reports good energy level.  He denies any unusual aches or     pains.  No new masses lymphadenopathy.  He denies any issues from his     Port-A-Cath.  He has some slight numbness and tingling in the fingertips and     toes but not interfering with normal daily activities.  He has no new complaints    today.            Cancer Details            Adenocarcinoma of gastroesophageal junction            Metastatic Sites      Liver            Clinical Staging      Stage IV            Treatments      Chemotherapy      3/6/19-4/17/17 completed 4C FOLFOX  progression  5/22/19-7/3/19 completed 3C     Keytruda   progression  Cyramza/Taxol initiated 8/7/19            Clinical Trial Participant      No            ECOG Performance Status      0            Most Recent Lab Findings            Item Value  Date Time             Urine Protein NEGATIVE mg/dL 1/22/20 1020            Item Value  Date Time             White Blood Count 6.03 10*3/uL 1/22/20 1105             Red Blood Count 3.38 10*6/uL L 1/22/20 1105             Hemoglobin 11.5 g/dL L  1/22/20 1105             Hematocrit 35.8 % L 1/22/20 1105             Mean Corpuscular Volume 105.9 fL H 1/22/20 1105             Mean Corpuscular Hemoglobin 34.0 pg H 1/22/20 1105             Mean Corpuscular Hemoglobin Concent 32.1 L 1/22/20 1105             Red Cell Distribution Width 19.6 % H 1/22/20 1105             RDW Standard Deviation 76.9 fL H 1/22/20 1105             Platelet Count 261 10*3/uL 1/22/20 1105             Mean Platelet Volume 11.0 fL 1/22/20 1105             Granulocytes (%) 55.5 % 1/22/20 1105            Item Value  Date Time             Sodium Level 138 mmol/L 1/22/20 1022             Potassium Level 3.9 mmol/L 1/22/20 1022             Chloride Level 103 mmol/L 1/22/20 1022             Carbon Dioxide Level 29 mmol/L 1/22/20 1022             Anion Gap 10 mmol/L 1/22/20 1022             Blood Urea Nitrogen 18 mg/dL 1/22/20 1022             Creatinine 0.91 mg/dL 1/22/20 1022             Glomerular Filtration Rate Calc >60 mL/min/{1.73_m2} 1/22/20 1022             BUN/Creatinine Ratio 20 {ratio} 1/22/20 1022             Glucose Level 96 mg/dL 1/22/20 1022             Calculated Osmolality 288 1/22/20 1022             Calcium Level 9.1 mg/dL 1/22/20 1022             Total Bilirubin 0.62 mg/dL 1/22/20 1022             Aspartate Amino Transf (AST/SGOT) 19 U/L 1/22/20 1022             Alanine Aminotransferase 14 U/L 1/22/20 1022             Alkaline Phosphatase 81 U/L 1/22/20 1022             Total Protein 6.1 g/dL L 1/22/20 1022             Albumin 3.7 g/dL 1/22/20 1022             Globulin 2.4 g/dL 1/22/20 1022             Albumin/Globulin Ratio 1.5 {ratio} 1/22/20 1022            Laboratory Tests      1/9/20 08:50            PAST, FAMILY   Past Medical History      Past Medical History:  Arthritis, Heart Attack, Hypertension      Hematology/Oncology (M):  GI Cancer, Oral Cancer, Skin Cancer            Past Surgical History      Biopsy (LIVER), Coronary Stent, Joint Replacement, Skin Cancer  Removal            Family History      Family History:  Uterine Cancer            Social History      Marital Status:        Lives independently:  Yes      Number of Children:  3      Occupation:  RETIRED            Tobacco Use      Tobacco status:  Former smoker            Alcohol Use      Alcohol intake:  None            Substance Use      Substance use:  Denies use            REVIEW OF SYSTEMS      General:  Admits: Fatigue      Eye:  Admits Corrective Lenses      ENT:  Denies Headache; Admits Hearing Loss      Cardiovascular:  Denies Chest Pain      Musculoskeletal:  Denies Back Pain, Denies Muscle Pain      Neurologic:  Denies Dizziness, Denies Numbness\Tingling      Psychiatric:  Denies Depression            VITAL SIGNS AND SCORES      Vitals      Weight 260 lbs 12.867 oz / 118.3 kg      Temperature 98.6 F / 37 C - Temporal      Pulse 79      Respirations 20      Blood Pressure 152/73 Sitting      Pulse Oximetry 97%, RM AIR            Pain Score      Pain Scale Used:  Numerical      Pain Intensity:  0            Fatigue Score      Fatigue (0-10 scale):  2            EXAM      General Appearance:  Positive for: Alert, Oriented x3, Cooperative;          Negative for: Acute Distress      Eye:  Positive for: Anicteric Sclerae, Moist Conjunctiva      Neck:  Positive for: Supple;          Negative for: JVD, Masses      Respiratory:  Positive for: CTAB, Normal Respiratory Effort      Chest:  Port in Place      Abdomen/Gastro:  Positive for: Normal Active Bowel Sounds, Soft;          Negative for: Distention, Hepatosplenomegaly, Tenderness      Cardiovascular:  Positive for: RRR;          Negative for: Gallop, Murmur, Peripheral Edema, Rub      Psychiatric:  Positive for: Appropriate Affect, Intact Judgement      Lymphatic:  Negative for: Cervical, Infraclavicular, Supraclavicular            PREVENTION      Hx Influenza Vaccination:  Yes      Date Influenza Vaccine Given:  Nov 1, 2019      2 or More Falls Past  Year?:  No      Fall Past Year with Injury?:  No      Encouraged to follow-up with:  PCP regarding preventative exams.      Chart initiated by      DIANA COSME MA            ALLERGY/MEDS      Allergies      Coded Allergies:             NO KNOWN DRUG ALLERGIES (Verified  Allergy, Unknown, 1/22/20)            Medications      Last Reconciled on 1/22/20 12:57 by DEBBIE TAVAREZ      Potassium Chloride (K-Tab*) 10 Meq Tablet.er      20 MEQ PO QDAY for low potassium for 30 Days, #60 TAB 3 Refills         Prov: DEBBIE TAVAREZ         1/22/20       Pyridoxine (Vitamin B6) 100 Mg Tablet      100 MG PO BID, TAB         Reported         11/13/19       DULoxetine (Cymbalta) 20 Mg Capsule.dr      20 MG PO QDAY for 30 Days, #30 CAP 5 Refills         Prov: DEBBIE TAVAREZ         11/6/19       Pravastatin Sodium (Pravastatin Sodium) 40 Mg Tablet      40 MG PO QDAY, #30 TAB 0 Refills         Reported         11/6/19       Methotrexate Sodium (Methotrexate) 2.5 Mg Tab      12.5 MG PO Watson, TAB         Reported         11/6/19       (herbs)   No Conflict Check               Reported         10/2/19       Chlorthalidone (CHLORTHALIDONE) 25 Mg Tablet      12.5 MG PO QDAY for 30 Days, #15 TAB         Reported         10/2/19       PACLitaxel (taxOL) 6 Mg/1 Ml Vial      0 IV D 1, 8      Reported         7/31/19       (Cyramza)   No Conflict Check      IV D 1      Reported         7/31/19       Telmisartan (Micardis) 20 Mg Tab      20 MG PO QDAY, TAB         Reported         7/31/19       Allopurinol (Allopurinol) 300 Mg Tablet      300 MG PO QDAY, #30 TAB 0 Refills         Reported         6/12/19       Ubidecarenone (Co Q-10) 1 Each Capsule      100 MG PO QDAY, CAP         Reported         5/15/19       Folic Acid (Folic Acid) 1 Mg Tablet      1 MG PO QDAY, #30 TAB 0 Refills         Reported         5/1/19       Lidocaine/Prilocaine (Emla) 30 Gm Cream..g.      1 APL TOPICAL ONCE for apply to port site w/chemo, #2 GM 6 Refills          Prov: RUBA JUNE         2/26/19       Prochlorperazine Maleate (Prochlorperazine Maleate) 10 Mg Tab      10 MG PO Q6H PRN for NAUSEA AND/OR VOMITING for 30 Days, #120 TAB 3 Refills         Prov: DEBBIE TAVAREZ         2/13/19       Ondansetron Hcl (ONDANSETRON HCL) 4 Mg Tablet      8 MG PO Q8H PRN for NAUSEA AND/OR VOMITING for 30 Days, #180 TAB 3 Refills         Prov: DEBBIE TAVAREZ         2/13/19       Aspirin Chew (Aspirin Baby) 81 Mg Tab.chew      81 MG PO QDAY, #30 TAB.CHEW 0 Refills         Reported         2/13/19       Furosemide* (Lasix*) 40 Mg Tablet      40 MG PO QDAY, #30 TAB 0 Refills         Reported         2/13/19       Metoprolol Tartrate (Metoprolol Tartrate) 25 Mg Tablet      25 MG PO BID, #60 TAB 0 Refills         Reported         2/13/19       prednisoLONE (prednisoLONE) 5 Mg Tab      5 MG PO QDAY, TAB         Reported         2/13/19       Pantoprazole (Protonix*) 20 Mg Tablet      40 MG PO BIDAC, #120 TAB 0 Refills         Reported         2/13/19      Medications Reviewed:  No Changes made to meds            IMPRESSION/PLAN      Diagnosis      Malignant neoplasm of lower third of esophagus - C15.5      Patient is on palliative treatment with Cyramza and Taxol.  He is due for cycle     7.  Tolerating very well.  Lab work is adequate for treatment.  Proceed with     cycle 7 as planned.  I will see him back for cycle 8, day 1 with labs and     restaging scans prior.            Hypokalemia - E87.6      Patient is on potassium supplement.  Potassium level is normal.  Continue     potassium supplement.  Refill provided today.            Notes      Renewed Medications      * POTASSIUM CHLORIDE (K-Tab*) 10 MEQ TABLET.ER: 20 MEQ PO QDAY low potassium 30       Days #60      New Diagnostics      * CT Abd/Pelvis/Chest W/Contrast, 4 Weeks         Dx: Malignant neoplasm of lower third of esophagus - C15.5      * CBC With Auto Diff, 4 Weeks         Dx: Malignant neoplasm of lower third of esophagus -  C15.5      * CMP Comp Metabolic Panel, 4 Weeks         Dx: Malignant neoplasm of lower third of esophagus - C15.5            Patient Education            Eat Well, Exercise Well, Be Well: Dietary and Fitness Guidelines      Patient Education Provided:  Yes            Electronically signed by DEBBIE TAVAREZ  01/22/2020 12:57       Disclaimer: Converted document may not contain table formatting or lab diagrams. Please see [employer] [facility] for the authenticated document.

## 2021-05-28 NOTE — PROGRESS NOTES
Patient: COLLEEN CONNOLLY     Acct: [id number]     Report: #[id number]  UNIT #: [id number]     : 1950    Encounter Date:05/15/2019  PRIMARY CARE: SATISH CASTRO  ***Signed***  --------------------------------------------------------------------------------------------------------------------  NURSE INTAKE      Visit Type      Established Patient Visit            Chief Complaint      ESOPHAGEAL CA      Intent of Therapy:  Palliative            Referring Provider/Copies To      Provider Not Found in Lookup:  DANA RODRIGUEZ      Primary Care Provider:  SATISH CASTRO            History and Present Illness      Past Oncology Illness History      Mr. Connolly is a 68-year-old white male who presents today for evaluation and     discussion of treatment options for his recently diagnosed esophageal cancer.      Patient states that this was initially identified when he was noted to be iron     deficient by his rheumatologist who was doing routine blood work for follow-up     of his methotrexate and rheumatoid arthritis treatment.  He underwent     colonoscopy which was benign as well as EGD at his local hospital that showed a     lesion in the distal esophagus.  He was seen by surgical oncology at [facility], Dr. Do.  Repeat EGD confirmed lesion in the lower     esophagus/GE junction area.  Biopsies showed marked atypia.  Surgery was     considered but patient and family desired a second opinion.  They were then seen    by CT surgery at [facility], Dr. Girard who repeated the     testing and this time biopsy was positive for adenocarcinoma.  He underwent     staging endoscopic ultrasound which identified a lesion in the distal esophagus,    GE junction, cardia of the stomach.  A cardia lymph node was also positive on     FNA giving him N1 disease.  His [facility] physicians have recommended ne    oadjuvant concurrent chemo RT followed by resection based on the trial CROSS      trial.  Patient states that he has had no issues with swallowing, dysphagia,     stomach or chest pain, weight loss, nausea or vomiting.  He continues to take     methotrexate for his rheumatoid arthritis and has considerable difficulties     especially with his hands.  He reports that he has recently completed     intravenous iron therapy.      Upon simulation for esophageal radiation-an area was found on the liver--right     lobe of liver.  MRI liver showed 9 mm lesion in the inferior right hepatic lobe     shows features suspicious for a single hepatic metastasis, particularly given     history of malignancy.       Started on palliative FOLFOX            HPI - Oncology Interim      f/u esophageal ca--recent liver bx showed metastatic dx--he is feeling     well--denies dysphasia at this time.  He does present in w/c d/t his rheumatoid     arthritis has flaring, causing his pain to increase and his difficulty     ambulating.  Appetite is fair; wt down approx 2kg since beginning of Feb 2019.      Pt denies fever, SOA and distress at this time.            Cancer Details            Adenocarcinoma of gastroesophageal junction            Metastatic Sites      Liver            Clinical Staging      Stage IV            Treatments      Chemotherapy      3/6/19-4/17/17 completed 4C FOLFOX  progression  Keytruda prescribed 5/15/19            Clinical Trial Participant      No            ECOG Performance Status      0            Most Recent Lab Findings      Laboratory Tests      5/1/19 09:45            5/10/19 11:21            Laboratory Tests            Test       5/1/19      09:45             Magnesium Level       1.82 mg/dL      (1.60-2.30)            PAST, FAMILY   Past Medical History      Past Medical History:  Arthritis, Heart Attack, Hypertension      Hematology/Oncology (M):  GI Cancer, Oral Cancer, Skin Cancer            Past Surgical History      Biopsy (LIVER), Coronary Stent, Joint Replacement, Skin Cancer  Removal            Family History      Family History:  Uterine Cancer            Social History      Marital Status:        Lives independently:  Yes      Number of Children:  3      Occupation:  RETIRED            Tobacco Use      Tobacco status:  Former smoker            Alcohol Use      Alcohol intake:  None            Substance Use      Substance use:  Denies use            REVIEW OF SYSTEMS      General:  Admits: Fatigue;          Denies: Appetite Change, Fever, Night Sweats, Weight Gain, Weight Loss      Eye:  Denies: Blurred Vision, Corrective Lenses, Diplopia, Eye Irritation, Eye     Pain, Eye Redness, Spots in Vision, Vision Loss      ENT:  Denies: Headache, Hearing Loss, Hoarseness, Seizures, Sinus Congestion,     Sore Throat      Cardiovascular:  Denies: Chest Pain, Edema Ankles, Edema Legs, Irregular     Heartbeat, Palpitations      Respiratory:  Denies: Coughing Blood, Productive Cough, Shortness of Air,     Wheezing      Gastrointestinal:  Denies: Bloody Stools, Constipation, Diarrhea, Frequent     Heartburn, Nausea, Problem Swallowing, Tarry Stools, Unable to Control Bowels,     Vomiting      Genitourinary (male):  Denies: Blood in Urine, Decrease Urine Stream, Frequent     Urination, Incontinence, Painful Urination      Musculoskeletal:  Admits: Painful Joints, Swollen Joints      Integumentary:  Admits: Lesions (LIVER);          Denies: Bleeds Easily, Bruises Easily, Hair Changes, Jaundice, Mole Changes,     Nail Changes, Pigment Changes, Rash, Skin Discoloration      Neurologic:  Denies: Dizziness, Fainting, Numbness\Tingling, Paralysis, Seizures      Psychiatric:  Denies: Anxiety, Confused, Depression, Disoriented, Memory Loss      Endocrine:  Denies: Cold Intolerance, Diabetes, Excessive Sweating, Excessive     Thirst, Excessive Urination, Heat Intolerance, Flushing, Hyperthyroidism,     Hypothyroidism      Hematologic/Lymphatic:  Denies: Bruising, Bleeding, Enlarged Lymph Nodes,      Recurrent Infections, Transfusions            VITAL SIGNS AND SCORES      Vitals      Height 5 ft 9.00 in / 175.26 cm      Weight 258 lbs 13.121 oz / 117.4 kg      BSA 2.30 m2      BMI 38.2 kg/m2      Temperature 97.4 F / 36.33 C - Temporal      Pulse 74      Respirations 18      Blood Pressure 125/79 Sitting, Right Arm      Pulse Oximetry 97%, RM AIR            Pain Score      Pain Scale Used:  Numerical      Pain Intensity:  9            Fatigue Score      Fatigue (0-10 scale):  4            EXAM      General Appearance:  Positive for: Alert, Oriented x3, Cooperative;          Negative for: Acute Distress      Eye:  Positive for: Anicteric Sclerae, Moist Conjunctiva      Neck:  Positive for: Supple;          Negative for: JVD, Masses      Respiratory:  Positive for: CTAB, Normal Respiratory Effort      Chest:  Port in Place      Abdomen/Gastro:  Positive for: Normal Active Bowel Sounds, Soft;          Negative for: Distention, Hepatosplenomegaly, Tenderness      Cardiovascular:  Positive for: RRR;          Negative for: Gallop, Murmur, Peripheral Edema, Rub      Psychiatric:  Positive for: Appropriate Affect, Intact Judgement      Other      Diffuse synovitis of the metacarpophalangeal joints and first interphalangeal     joints on both hands      Lymphatic:  Negative for: Cervical, Infraclavicular, Supraclavicular            PREVENTION      Hx Influenza Vaccination:  Yes      Date Influenza Vaccine Given:  Dec 1, 2018      2 or More Falls Past Year?:  No      Fall Past Year with Injury?:  No      Encouraged to follow-up with:  PCP regarding preventative exams.      Chart initiated by      DIANA COSME MA            ALLERGY/MEDS      Allergies      Coded Allergies:             NO KNOWN DRUG ALLERGIES (Verified  Allergy, Unknown, 5/15/19)            Medications      Last Reconciled on 5/15/19 15:05 by YADI JC      Ubidecarenone (Co Q-10) 1 Each Capsule      100 MG PO QDAY, CAP         Reported          5/15/19       Folic Acid (Folic Acid*) 1 Mg Tablet      1 MG PO QDAY, #30 TAB 0 Refills         Reported         5/1/19       amLODIPine (amLODIPine) 2.5 Mg Tablet      2.5 MG PO QDAY, #30 TAB 0 Refills         Reported         5/1/19       Lidocaine/Prilocaine (Emla) 30 Gm Cream..g.      1 APL TOPICAL ONCE for apply to port site w/chemo, #2 GM 6 Refills         Prov: RUBA JUNE         2/26/19       Prochlorperazine Maleate (Prochlorperazine Maleate) 10 Mg Tab      10 MG PO Q6H PRN for NAUSEA AND/OR VOMITING for 30 Days, #120 TAB 3 Refills         Prov: DEBBIE TAVAREZ         2/13/19       Ondansetron Hcl (ONDANSETRON HCL) 4 Mg Tablet      8 MG PO Q8H PRN for NAUSEA AND/OR VOMITING for 30 Days, #180 TAB 3 Refills         Prov: DEBBIE TAVAREZ         2/13/19       Aspirin Chew (Aspirin Baby) 81 Mg Tab.chew      81 MG PO QDAY, #30 TAB.CHEW 0 Refills         Reported         2/13/19       Allopurinol (Zyloprim*) 100 Mg Tablet      100 MG PO BID for 30 Days, #60 TAB 0 Refills         Reported         2/13/19       Furosemide* (Lasix*) 40 Mg Tablet      40 MG PO QDAY, #30 TAB 0 Refills         Reported         2/13/19       Pravastatin Sod (Pravastatin*) 40 Mg Tablet      80 MG PO QDAY, #60 TAB 0 Refills         Reported         2/13/19       Metoprolol Tartrate (Metoprolol Tartrate*) 25 Mg Tablet      25 MG PO BID, #60 TAB 0 Refills         Reported         2/13/19       Prednisolone* (Prednisolone*) 5 Mg Tab      5 MG PO QDAY, TAB         Reported         2/13/19       Telmisartan (Micardis*) 40 Mg Tablet      80 MG PO QDAY, TAB         Reported         2/13/19       Pantoprazole (Protonix*) 20 Mg Tablet      40 MG PO BIDAC, #120 TAB 0 Refills         Reported         2/13/19      Medications Reviewed:  No Changes made to meds            IMPRESSION/PLAN      Impression      Adenocarcinoma of the esophagus/GE junction.  Rheumatoid arthritis            Diagnosis      Metastasis from esophageal cancer - C79.9,  C15.9      Patient has biopsy-proven progression of his disease with multiple new liver     lesions.  Prior [facility] testing demonstrates a PDL 1 mutation.  We discussed that     this is unfortunately not curable but can be treated for disease control,     symptom improvement and some prolongation of life.  We discussed traditional     systemic chemotherapy versus immunotherapy such as Keytruda given the PDL 1     positivity versus clinical trials which would require evaluation at the     [facility].  He is not interested in travel at this point.  He is interested in     trying second line therapy.  I feel that Keytruda 200 mg IV every 3 weeks would     be a very good next treatment option.  Side effects, risk and benefits discussed    in detail.  Written teaching information provided.  He will have lab work today     to ensure adequate endorgan functions and blood counts.  I will plan his initial    cycle next week.  I will see him back for cycle 2, day 1 with labs prior.  Plan     restaging scans after cycle 3.      New Diagnostics      * CMP Comp Metabolic Panel, Routine      * CBC With Auto Diff, Routine            Rheumatoid arthritis         Rheumatoid arthritis involving multiple sites, unspecified rheumatoid factor        presence - M06.9         Rheumatoid arthritis location: multiple sites         Rheumatoid factor presence: unspecified presence      Patient is having a a diffuse flareup of his rheumatoid arthritis affecting     multiple joints including the knees, hips, hands.  He should follow-up with his     rheumatologist.  With the new regimen, Keytruda, he should avoid steroid doses     greater than 10 mg/day.  Methotrexate as he had been on previously should not     interfere with Keytruda, nor would Humira.            Notes      New Medications      * Ubidecarenone (Co Q-10) 1 EACH CAPSULE: 100 MG PO QDAY      New Diagnostics      * Thyroid Profile, Routine         Dx: Hypothyroid - E03.9             Patient Education            Pembrolizumab Injection      Patient Education Provided:  Yes                 Disclaimer: Converted document may not contain table formatting or lab diagrams. Please see [employer] for the authenticated document.

## 2021-05-28 NOTE — PROGRESS NOTES
Patient: COLLEEN CONNOLLY     Acct: [id number]     Report: #[id number]  UNIT #: [id number]     : 1950    Encounter Date:2020  PRIMARY CARE: SATISH CASTRO  ***Signed***  --------------------------------------------------------------------------------------------------------------------  NURSE INTAKE      Visit Type      Established Patient Visit            Chief Complaint      ESOPHAGEAL CA      Intent of Therapy:  Palliative            History and Present Illness      Past Oncology Illness History      Mr. Connolly is a 69-year-old white male who presents with esophageal cancer.      Patient states that this was initially identified when he was noted to be iron     deficient by his rheumatologist who was doing routine blood work for follow-up     of his methotrexate and rheumatoid arthritis treatment.  He was seen by surgical    oncology at [facility], Dr. Do.  Repeat EGD confirmed lesion     in the lower esophagus/GE junction area.  Biopsies showed marked atypia.      Surgery was considered but patient and family desired a second opinion.  They     were then seen by CT surgery at [facility], Dr. Girard who     repeated the testing and this time biopsy was positive for adenocarcinoma.  He     underwent staging endoscopic ultrasound which identified a lesion in the distal     esophagus, GE junction, cardia of the stomach.  A cardia lymph node was also     positive on FNA giving him N1 disease.  His [place] physicians have     recommended neoadjuvant concurrent chemo RT followed by resection based on the     trial CROSS trial.  Upon simulation for esophageal radiation-an area was found     on the liver--right lobe of liver.  MRI liver showed 9 mm lesion in the inferior    right hepatic lobe shows features suspicious for a single hepatic metastasis,     particularly given history of malignancy.20 Cycle 10 day 15 held due to     increased productive cough. 10  days of antibiotics prescribed.  8/26/20     progression stopped Cyramza/Taxol started Irinotecan. 9/30/2020 patient     experience a significant amount of diarrhea after second treatment which     required hospitalization. Irinotecan stopped and Lonsurf initiated.            HPI - Oncology Interim      Patient returns for follow-up of his esophageal cancer.  He is on fourth line     irinotecan.  He has had 2 cycles.  Both cycles were complicated with severe     diarrhea to the point where he required hospitalization at his local facility     after cycle 2.  He was dehydrated.  He was treated with antibiotics although is     not clear that he had infection.  The diarrhea was persistent despite     adjustments in his premedication and the use of Lomotil and Imodium.  Patient     states he is now feeling a little better although he does have hemorrhoidal     irritation from the diarrhea.  He denies blood per rectum, melena or pain with     bowel movement.  He is eating and drinking better.  He had not been taking his     magnesium supplement consistently although he has resumed now.            Cancer Details            Adenocarcinoma of gastroesophageal junction            Clinical Staging      Stage IV            Treatments      Chemotherapy      3/6/19-4/17/17 completed 4C FOLFOX  progression  5/22/19-7/3/19 completed 3C     Keytruda   progression  Cyramza/Taxol initiated 8/7/19 8/26/20 progression     stopped Cyramza/Taxol started Irinotecan. 9/30/20 Irinitecan stopped and Lonsurf    initiated.            Clinical Trial Participant      No            ECOG Performance Status      0            Most Recent Lab Findings            Item Value  Date Time             Sodium Level 144 mmol/L 9/30/20 0850             Potassium Level 3.6 mmol/L 9/30/20 0850             Chloride Level 112 mmol/L H 9/30/20 0850             Carbon Dioxide Level 25 mmol/L 9/30/20 0850             Anion Gap 11 mmol/L 9/30/20 0850              Blood Urea Nitrogen 11 mg/dL 9/30/20 0850             Creatinine 0.97 mg/dL 9/30/20 0850             Glomerular Filtration Rate Calc >60 mL/min/{1.73_m2} 9/30/20 0850             BUN/Creatinine Ratio 11 {ratio} 9/30/20 0850             Glucose Level 95 mg/dL 9/30/20 0850             Calculated Osmolality 297 9/30/20 0850             Calcium Level 9.2 mg/dL 9/30/20 0850             Magnesium Level 1.02 mg/dL L 9/30/20 0850             Total Bilirubin 0.26 mg/dL 9/30/20 0850            Laboratory Tests      9/16/20 09:55            9/30/20 08:54            Laboratory Tests            Test       9/16/20      09:55             Magnesium Level       1.26 mg/dL      (1.60-2.30)            PAST, FAMILY   Past Medical History      Past Medical History:  Arthritis, Heart Attack, Hypertension      Hematology/Oncology (M):  GI Cancer, Oral Cancer, Skin Cancer            Past Surgical History      Biopsy, Coronary Stent, Joint Replacement, Skin Cancer Removal            Family History      Family History:  Uterine Cancer            Social History      Lives independently:  Yes      Number of Children:  3      Occupation:  RETIRED            Tobacco Use      Tobacco status:  Former smoker            Substance Use      Substance use:  Denies use            REVIEW OF SYSTEMS      General:  Admits: Fatigue;          Denies: Appetite Change      Eye:  Admits Corrective Lenses      ENT:  Admits Hearing Loss      Respiratory:  Denies: Cough, Shortness of Air      Gastrointestinal:  Admits Diarrhea; Denies Problem Swallowing      Musculoskeletal:  Admits Muscle Pain      Other      HEMORRHOID      Integumentary:  Denies Itching, Denies Rash      Neurologic:  Denies Dizziness, Denies Numbness\Tingling            VITAL SIGNS AND SCORES      Vitals      Weight 257 lbs 7.956 oz / 116.8 kg      Temperature 98.4 F / 36.89 C - Temporal      Pulse 69      Respirations 22      Blood Pressure 153/85 Sitting      Pulse Oximetry 96%, RM AIR             Pain Score      Pain Scale Used:  Numerical      Pain Intensity:  9            Fatigue Score      Fatigue (0-10 scale):  1            EXAM      General Appearance:  Positive for: Alert, Cooperative;          Negative for: Acute Distress      Eye:  Positive for: Anicteric Sclerae, Moist Conjunctiva      Neck:  Positive for: Supple;          Negative for: JVD, Masses      Respiratory:  Positive for: CTAB, Normal Respiratory Effort      Chest:  Port in Place      Abdomen/Gastro:  Positive for: Normal Active Bowel Sounds, Soft;          Negative for: Distention, Hepatosplenomegaly, Tenderness      Cardiovascular:  Positive for: Peripheral Edema (2+ pitting edema bilateral     lower extremities), RRR;          Negative for: Gallop, Murmur, Rub      Psychiatric:  Positive for: Appropriate Affect, Intact Judgement      Lymphatic:  Negative for: Cervical, Infraclavicular, Supraclavicular            PREVENTION      Date Influenza Vaccine Given:  Nov 1, 2019      2 or More Falls in Past Year?:  No      Fall Past Year with Injury?:  No      Encouraged to follow-up with:  PCP regarding preventative exams.      Chart initiated by      MARLENE OKEEFE MA            ALLERGY/MEDS      Allergies      Coded Allergies:             NO KNOWN DRUG ALLERGIES (Verified  Allergy, Unknown, 9/30/20)            Medications      Last Reconciled on 9/30/20 15:11 by DEBBIE TAVAREZ      Trifluridine/Tipiracil HCl (Lonsurf 20 mg-8.19 mg Tablet) 1 Each Tablet      4 TAB PO BID for 10 Days, #80 TAB 5 Refills         Prov: DEBBIE TAVAREZ         9/30/20       Diphenoxylate/Atropine (Diphenoxylate/Atropine) 1 Each Tablet      2.5 MG PO QID for diarrhea, #60 TAB 0 Refills         Prov: DEBBIE TAVAREZ         9/17/20       Magnesium Oxide (Magnesium Oxide*) 400 Mg Tablet      400 MG PO QDAY for 30 Days, #30 TAB 4 Refills         Prov: DEBBIE TAVAREZ         9/16/20       Prochlorperazine Maleate (Prochlorperazine Maleate) 10 Mg Tab      10 MG PO Q6H PRN  for NAUSEA AND/OR VOMITING for 30 Days, #120 TAB 3 Refills         Prov: DEBBIE TAVAREZ         9/10/20       Potassium Chloride (K-Tab*) 10 Meq Tablet.er      20 MEQ PO QDAY for low potassium for 30 Days, #60 TAB 3 Refills         Prov: DEBBIE TAVAREZ         8/12/20       Levothyroxine (Synthroid) 0.025 Mg      0.0125 MG PO QDAY@07, #15 TAB 0 Refills         Reported         8/12/20       Mouthwash (Magic Mouthwash) 1 Each Bottle      15 ML PO Q4H PRN for MOUTH DISCOMFORT, #300 ML 1 Refill         Prov: DEBBIE TAVAREZ         5/20/20       Irbesartan (Irbesartan) 300 Mg Tablet      300 MG PO QDAY for 30 Days, #30 TAB         Reported         1/29/20       Pyridoxine (Vitamin B6) 100 Mg Tablet      100 MG PO BID, TAB         Reported         11/13/19       Methotrexate Sodium (Methotrexate) 2.5 Mg Tab      12.5 MG PO Watson, TAB         Reported         11/6/19       (herbs)   No Conflict Check               Reported         10/2/19       Chlorthalidone (CHLORTHALIDONE) 25 Mg Tablet      12.5 MG PO QDAY for 30 Days, #15 TAB         Reported         10/2/19       Allopurinol (Allopurinol) 300 Mg Tablet      300 MG PO QDAY, #30 TAB 0 Refills         Reported         6/12/19       Ubidecarenone (Co Q-10) 1 Each Capsule      100 MG PO QDAY, CAP         Reported         5/15/19       Folic Acid (Folic Acid) 1 Mg Tablet      1 MG PO QDAY, #30 TAB 0 Refills         Reported         5/1/19       Lidocaine/Prilocaine (Emla) 30 Gm Cream..g.      1 APL TOPICAL ONCE for apply to port site w/chemo, #2 GM 6 Refills         Prov: RUBA JUNE         2/26/19       Ondansetron Hcl (ONDANSETRON HCL) 4 Mg Tablet      8 MG PO Q8H PRN for NAUSEA AND/OR VOMITING for 30 Days, #180 TAB 3 Refills         Prov: DEBBIE TAVAREZ         2/13/19       Aspirin Chew (Aspirin Baby) 81 Mg Tab.chew      81 MG PO QDAY, #30 TAB.CHEW 0 Refills         Reported         2/13/19       Furosemide* (Lasix*) 40 Mg Tablet      60 MG PO QDAY, #30 TAB 0 Refills          Reported         2/13/19       Metoprolol Tartrate (Metoprolol Tartrate) 25 Mg Tablet      25 MG PO BID, #60 TAB 0 Refills         Reported         2/13/19       prednisoLONE (prednisoLONE) 5 Mg Tab      5 MG PO QDAY, TAB         Reported         2/13/19       Pantoprazole (Protonix) 20 Mg Tablet      40 MG PO BIDAC, #120 TAB 0 Refills         Reported         2/13/19      Medications Reviewed:  No Changes made to meds            IMPRESSION/PLAN      Diagnosis      Malignant neoplasm of lower third of esophagus - C15.5            Metastasis from esophageal cancer - C79.9, C15.9            Notes      Patient is on irinotecan for his metastatic cancer.  Tolerating very poorly     despite adjustments in his dose, premedications and antidiarrheal regimen at     home.  After discussion, it is agreed that this medication will not be c    ontinued.  We discussed other options such as clinical trial versus next line of     therapy which I would recommend as Lonsurf.  Based on his weight his dose of     the 80 mg tablets twice daily on days 1-5, 8-12 of a 28-day cycle.  Side     effects, risk and benefits discussed in detail.  Written teaching information     provided.  We discussed that the chances of meaningful response are small given     his multiple other lines of failure but he is still interested in pursuing     treatment.  We did discuss palliative care focusing on quality of life but again     he wishes to pursue therapy.  Recent lab work looks good.  I will check CBC on     day 15 of his cycle.  I will see him back in 4 weeks from the time he starts to     begin cycle 2 with labs prior.      New Medications      * Trifluridine/Tipiracil HCl (Lonsurf 20 mg-8.19 mg Tablet) 1 EACH TABLET: 4 TAB       PO BID 10 Days #80         Instructions: on days 1-5         Pain      Follow-up with PCP/Pain Management            Advanced Care Plan Discussion      Declines Discussion [id number]            Patient Education             Coping With Diarrhea Related to Chemotherapy      Trifluridine and Tipiracil      Patient Education Provided:  Yes            Electronically signed by DEBBIE TAVAREZ  09/30/2020 15:11       Disclaimer: Converted document may not contain table formatting or lab diagrams. Please see [employer] for the authenticated document.

## 2021-05-28 NOTE — PROGRESS NOTES
Patient: COLLEEN CONNOLLY     Acct: [id number]     Report: #[id number]  UNIT #: [id number]     : 1950    Encounter Date:2020  PRIMARY CARE: ASTISH CASTRO  ***Signed***  --------------------------------------------------------------------------------------------------------------------  NURSE INTAKE      Visit Type      Established Patient Visit            Chief Complaint      chemo tx            Referring Provider/Copies To      Primary Care Provider:  SATISH CASTRO      Copies To:   SATISH CASTRO            History and Present Illness      Past Oncology Illness History      Mr. Connolly is a 68-year-old white male who presents today for evaluation and     discussion of treatment options for his recently diagnosed esophageal cancer.      Patient states that this was initially identified when he was noted to be iron     deficient by his rheumatologist who was doing routine blood work for follow-up     of his methotrexate and rheumatoid arthritis treatment.  He underwent     colonoscopy which was benign as well as EGD at his local hospital that showed a     lesion in the distal esophagus.  He was seen by surgical oncology at [facility], Dr. Do.  Repeat EGD confirmed lesion in the lower     esophagus/GE junction area.  Biopsies showed marked atypia.  Surgery was     considered but patient and family desired a second opinion.  They were then seen    by CT surgery at [facility], Dr. Girard who repeated the     testing and this time biopsy was positive for adenocarcinoma.  He underwent     staging endoscopic ultrasound which identified a lesion in the distal esophagus,    GE junction, cardia of the stomach.  A cardia lymph node was also positive on     FNA giving him N1 disease.  His [place] physicians have recommended     neoadjuvant concurrent chemo RT followed by resection based on the trial CROSS t    rial.  Patient states that he has had no issues with  swallowing, dysphagia,     stomach or chest pain, weight loss, nausea or vomiting.  He continues to take     methotrexate for his rheumatoid arthritis and has considerable difficulties     especially with his hands.  He reports that he has recently completed     intravenous iron therapy.      Upon simulation for esophageal radiation-an area was found on the liver--right     lobe of liver.  MRI liver showed 9 mm lesion in the inferior right hepatic lobe     shows features suspicious for a single hepatic metastasis, particularly given     history of malignancy.       Started on palliative FOLFOX            HPI - Oncology Interim      Patient returns today for consideration of cycle 8 of Taxol plus Cyramza.  He     reports that he tolerates his infusions well.  He denies numbness and tingling     in the hands or feet.  No issues from his Port-A-Cath.  He denies any swallowing    difficulties, heartburn, nausea or vomiting.  He reports normal bowel movements.     He states that his energy level is good and adequate for all of his daily     needs.  He denies any unusual aches or pains.  No new masses or lymphadenopathy.            Cancer Details            Adenocarcinoma of gastroesophageal junction            Metastatic Sites      Liver            Clinical Staging      Stage IV            Treatments      Chemotherapy      3/6/19-17 completed 4C FOLFOX  progression  19-7/3/19 completed 3C     Keytruda   progression  Cyramza/Taxol initiated 19            Clinical Trial Participant      No            ECOG Performance Status      0            Most Recent Lab Findings      Laboratory Tests      20 10:51            Most Recent Imaging Findings      Patient: COLLEEN BRAGG   Acct: #[id number]   Report: #[id number]            UNIT #: [id number]    DOS: 20 1027      INSURANCE:MEDICARE PART A   LOCATION:CT     : 1950            PROVIDERS      ADMITTING:     ATTENDING: DEBBIE TAVAREZ       FAMILY:  SATISH CASTRO   ORDERING:  DEBBIE TAVAREZ         OTHER:    DICTATING:  Gino Montano MD            REQ #:[id number]   EXAM:CTACPWC - CT ABD CHEST PEL w CONTR      REASON FOR EXAM:  ESPOGEAL CA      REASON FOR VISIT:  ESPOGEAL CA            *******Signed******         PROCEDURE:   CT ABDOMEN; CT CHEST; CT PELVIS WITH CONTRAST             COMPARISON:   [facility], CT, CT CHEST ABD PEL W CONTRAST,     7/18/2019, 13:06.  [facility], CT, CT CHEST ABD PEL W CONTRAST, 9/30/2019, 11:34.  [facility],       CT, CT CHEST ABD PEL W CONTRAST, 11/26/2019, 11:32.             INDICATIONS:   ESOPHAGEAL CA             TECHNIQUE:   After obtaining the patient's consent, CT images were obtained with    intravenous contrast       material.      PROTOCOL:     Standard imaging protocol performed                RADIATION:     DLP: 2913mGy*cm          Automated exposure control was utilized to minimize radiation dose.       CONTRAST:   100cc Isovue 370 I.V.      LABS:     eGFR: >60ml/min/1.73m2             FINDINGS:         Chest:             The central tracheobronchial tree is clear.  There is a calcified granuloma at     within the right       upper lobe.  No concerning pulmonary nodule or mass is identified.  There is no     focal airspace       consolidation.  There is no pleural effusion.             A left subclavian port has its tip at the mid SVC.  The heart size appears     normal.  The great       vessels are normal in caliber.  No abnormally enlarged lymph nodes are     identified.  No discrete       esophageal mass is identified.             No aggressive osseous lesions are identified.  A left shoulder arthroplasty is     partially       visualized.             Abdomen/pelvis:             Multiple hepatic metastasis appear stable.  An index lesion within the right     hepatic lobe measuring       1 cm on image 30 appears stable.  There are several stones within  the     gallbladder.  The adrenal       glands, left kidney, spleen, and pancreas are unremarkable.  There is a tiny     nonobstructing right       renal stone.             The stomach appears normal.  The small bowel appears normal in caliber and     configuration.  The       colon appears normal.  The appendix appears normal.  There is no ascites or     loculated collection.        No abnormally enlarged lymph nodes are identified.             The rectum and prostate are unremarkable.  There is new wall thickening along     the left aspect of       the urinary bladder.             No aggressive osseous lesions are identified.  There are bilateral L5 pars     defects, resulting in       grade 1/2 anterolisthesis of L5 on S1.             CONCLUSION:         1. No evidence of metastasis within chest.      2. Stable hypodense lesions within liver, compatible with metastasis.      3. New focal wall thickening along left aspect of urinary bladder, raising     possibility of neoplasm.        Recommend correlation with cystoscopy.      4. Nonobstructing right renal stone.              CARLEE JAY MD             Electronically Signed and Approved By: CARLEE JAY MD on 2/17/2020 at 12:17                           Until signed, this is an unconfirmed preliminary report that may contain      errors and is subject to change.                                              RODD1:      D:02/17/20 1217            PAST, FAMILY   Past Medical History      Past Medical History:  Arthritis, Heart Attack, Hypertension      Hematology/Oncology (M):  GI Cancer, Oral Cancer, Skin Cancer            Past Surgical History      Biopsy (LIVER), Coronary Stent, Joint Replacement, Skin Cancer Removal            Family History      Family History:  Uterine Cancer            Social History      Marital Status:        Lives independently:  Yes      Number of Children:  3      Occupation:  RETIRED            Tobacco Use      Tobacco  status:  Former smoker            Alcohol Use      Alcohol intake:  None            Substance Use      Substance use:  Denies use            REVIEW OF SYSTEMS      General:  Admits: Fatigue      Eye:  Admits Corrective Lenses      ENT:  Admits Hearing Loss      Cardiovascular:  Denies Chest Pain      Respiratory:  Denies: Shortness of Air      Gastrointestinal:  Denies Constipation, Denies Diarrhea      Musculoskeletal:  Admits Muscle Pain, Admits Painful Joints      Neurologic:  Denies Numbness\Tingling            VITAL SIGNS AND SCORES      Vitals      Weight 261 lbs 7.449 oz / 118.6 kg      Temperature 98.8 F / 37.11 C - Temporal      Pulse 66      Respirations 18      Blood Pressure 143/81 Sitting      Pulse Oximetry 96%, RM AIR            Pain Score      Pain Scale Used:  Numerical      Pain Intensity:  1            Fatigue Score      Fatigue (0-10 scale):  1            EXAM      General Appearance:  Positive for: Alert, Oriented x3, Cooperative;          Negative for: Acute Distress      Eye:  Positive for: Anicteric Sclerae, Moist Conjunctiva      Neck:  Positive for: Supple;          Negative for: JVD, Masses      Respiratory:  Positive for: CTAB, Normal Respiratory Effort      Chest:  Port in Place      Abdomen/Gastro:  Positive for: Normal Active Bowel Sounds, Soft;          Negative for: Distention, Hepatosplenomegaly, Tenderness      Cardiovascular:  Positive for: RRR;          Negative for: Gallop, Murmur, Peripheral Edema, Rub      Psychiatric:  Positive for: Appropriate Affect, Intact Judgement      Lymphatic:  Negative for: Cervical, Infraclavicular, Supraclavicular            PREVENTION      Hx Influenza Vaccination:  Yes      Date Influenza Vaccine Given:  Nov 1, 2019      2 or More Falls Past Year?:  No      Fall Past Year with Injury?:  No      Encouraged to follow-up with:  PCP regarding preventative exams.      Chart initiated by      DIANA COSME MA            ALLERGY/MEDS      Allergies       Coded Allergies:             NO KNOWN DRUG ALLERGIES (Verified  Allergy, Unknown, 2/19/20)            Medications      Last Reconciled on 2/19/20 13:34 by DEBBIE TAVAREZ      Irbesartan (Irbesartan) 300 Mg Tablet      300 MG PO QDAY for 30 Days, #30 TAB         Reported         2/5/20       Irbesartan (Irbesartan) 300 Mg Tablet      300 MG PO QDAY for 30 Days, #30 TAB         Reported         1/29/20       Potassium Chloride (K-Tab*) 10 Meq Tablet.er      20 MEQ PO QDAY for low potassium for 30 Days, #60 TAB 3 Refills         Prov: DEBBIE TAVAREZ         1/22/20       Pyridoxine (Vitamin B6) 100 Mg Tablet      100 MG PO BID, TAB         Reported         11/13/19       DULoxetine (Cymbalta) 20 Mg Capsule.dr      20 MG PO QDAY for 30 Days, #30 CAP 5 Refills         Prov: DEBBIE TAVAREZ         11/6/19       Methotrexate Sodium (Methotrexate) 2.5 Mg Tab      12.5 MG PO Watson, TAB         Reported         11/6/19       (herbs)   No Conflict Check               Reported         10/2/19       Chlorthalidone (CHLORTHALIDONE) 25 Mg Tablet      12.5 MG PO QDAY for 30 Days, #15 TAB         Reported         10/2/19       PACLitaxel (taxOL) 6 Mg/1 Ml Vial      0 IV D 1, 8      Reported         7/31/19       (Cyramza)   No Conflict Check      IV D 1      Reported         7/31/19       Telmisartan (Micardis) 20 Mg Tab      20 MG PO QDAY, TAB         Reported         7/31/19       Allopurinol (Allopurinol) 300 Mg Tablet      300 MG PO QDAY, #30 TAB 0 Refills         Reported         6/12/19       Ubidecarenone (Co Q-10) 1 Each Capsule      100 MG PO QDAY, CAP         Reported         5/15/19       Folic Acid (Folic Acid) 1 Mg Tablet      1 MG PO QDAY, #30 TAB 0 Refills         Reported         5/1/19       Lidocaine/Prilocaine (Emla) 30 Gm Cream..g.      1 APL TOPICAL ONCE for apply to port site w/chemo, #2 GM 6 Refills         Prov: RUBA JUNE         2/26/19       Prochlorperazine Maleate (Prochlorperazine Maleate) 10 Mg Tab       10 MG PO Q6H PRN for NAUSEA AND/OR VOMITING for 30 Days, #120 TAB 3 Refills         Prov: CORBYDEBBIE         2/13/19       Ondansetron Hcl (ONDANSETRON HCL) 4 Mg Tablet      8 MG PO Q8H PRN for NAUSEA AND/OR VOMITING for 30 Days, #180 TAB 3 Refills         Prov: DEBBIE TAVAREZ         2/13/19       Aspirin Chew (Aspirin Baby) 81 Mg Tab.chew      81 MG PO QDAY, #30 TAB.CHEW 0 Refills         Reported         2/13/19       Furosemide* (Lasix*) 40 Mg Tablet      40 MG PO QDAY, #30 TAB 0 Refills         Reported         2/13/19       Metoprolol Tartrate (Metoprolol Tartrate) 25 Mg Tablet      25 MG PO BID, #60 TAB 0 Refills         Reported         2/13/19       prednisoLONE (prednisoLONE) 5 Mg Tab      5 MG PO QDAY, TAB         Reported         2/13/19       Pantoprazole (Protonix) 20 Mg Tablet      40 MG PO BIDAC, #120 TAB 0 Refills         Reported         2/13/19      Medications Reviewed:  No Changes made to meds            IMPRESSION/PLAN      Diagnosis      Malignant neoplasm of lower third of esophagus - C15.5            Metastasis from esophageal cancer - C79.9, C15.9            Notes      Patient returns today for consideration of ongoing chemotherapy.  He is on     Cyramza and Taxol.  He is due for cycle 8.  He is tolerating his chemotherapy     very well.  I reviewed his restaging scans with patient and his wife.  He has     stable disease with no evidence of progression or worsening, particularly in the     liver.  His lab work today is adequate for treatment including negative protein     on urinalysis.  TSH is slightly elevated and will be monitored.  Proceed with     cycle 8 as planned.  I will see him back for cycle 9 with labs prior.            Patient Education            Eat Well, Exercise Well, Be Well: Dietary and Fitness Guidelines      Patient Education Provided:  Yes            Electronically signed by DEBBIE TAVAREZ  02/19/2020 13:34       Disclaimer: Converted document may not contain  table formatting or lab diagrams. Please see [employer] [facility] for the authenticated document.

## 2021-05-28 NOTE — PROGRESS NOTES
Patient: COLLEEN CONNOLLY     Acct: [id number]     Report: #[id number]  UNIT #: [id number]     : 1950    Encounter Date:2020  PRIMARY CARE: SATISH CASTRO  ***Signed***  --------------------------------------------------------------------------------------------------------------------  NURSE INTAKE      Visit Type      Established Patient Visit            Chief Complaint      ESOPHAGEAL CA FOLLOW UP      Intent of Therapy:  Palliative            Referring Provider/Copies To      Primary Care Provider:  SATISH CASTRO      Copies To:   SATISH CASTRO            History and Present Illness      Past Oncology Illness History      Mr. Connolly is a 69-year-old white male who presents with esophageal cancer.      Patient states that this was initially identified when he was noted to be iron     deficient by his rheumatologist who was doing routine blood work for follow-up     of his methotrexate and rheumatoid arthritis treatment.  He was seen by surgical    oncology at [facility], Dr. Do.  Repeat EGD confirmed lesion     in the lower esophagus/GE junction area.  Biopsies showed marked atypia.      Surgery was considered but patient and family desired a second opinion.  They     were then seen by CT surgery at [facility], Dr. Girard who     repeated the testing and this time biopsy was positive for adenocarcinoma.  He     underwent staging endoscopic ultrasound which identified a lesion in the distal     esophagus, GE junction, cardia of the stomach.  A cardia lymph node was also p    ositive on FNA giving him N1 disease.  His University physicians have     recommended neoadjuvant concurrent chemo RT followed by resection based on the     trial CROSS trial.  Upon simulation for esophageal radiation-an area was found     on the liver--right lobe of liver.  MRI liver showed 9 mm lesion in the inferior    right hepatic lobe shows features suspicious for a single hepatic  metastasis, p    articularly given history of malignancy.20 Cycle 10 day 15 held due to     increased productive cough. 10 days of antibiotics prescribed.  20     progression stopped Cyramza/Taxol started Irinotecan.            HPI - Oncology Interim      Patient presents today for ongoing treatment of his metastatic esophagus/GE     junction adenocarcinoma.  He is status post treatment as outlined, currently on     Cyramza.  He is tolerating the infusions well.  He denies swallowing     difficulties, nausea, vomiting or abdominal pain.  He notes ongoing joint pain     from his underlying inflammatory arthritis.  His rheumatologist recently     increased his methotrexate which has helped some.  He denies any other unusual     aches or pains.  No issues from his Port-A-Cath.  He notes good energy and     appetite.            Cancer Details            Adenocarcinoma of gastroesophageal junction            Metastatic Sites      Liver            Clinical Staging      Stage IV            Treatments      Chemotherapy      3/6/19-17 completed 4C FOLFOX  progression  19-7/3/19 completed 3C     Keytruda   progression  Cyramza/Taxol initiated 19 progression     stopped Cyramza/Taxol started Irinotecan.            Clinical Trial Participant      No            ECOG Performance Status      0            Most Recent Lab Findings      Laboratory Tests      20 13:17            Most Recent Imaging Findings      Patient: COLLEEN BRAGG   Acct: #[id number]   Report: #[id number]            UNIT #: [id number]    DOS: 20 1230      INSURANCE:MEDICARE PART A   LOCATION:CT     : 1950            PROVIDERS      ADMITTING:     ATTENDING: DEBBIE TAVAREZ      FAMILY:  SATISH CASTRO   ORDERING:  DEBBIE TAVAREZ         OTHER:    DICTATING:  Bravo Youngblood MD, IV            REQ #:[id number]   EXAM:[facility] - CT ABD CHEST PEL w CONTR      REASON FOR EXAM:  ESPHAGEAL CA      REASON FOR VISIT:   ESPHAGEAL CA            *******Signed******         PROCEDURE:   CT ABDOMEN; CT CHEST; CT PELVIS WITH CONTRAST             COMPARISON:   [facility], CT, CT CHEST ABD PEL W CONTRAST,     5/04/2020, 14:30.             INDICATIONS:   ESPHAGEAL CA, LIVER CA             TECHNIQUE:   Axial images of the chest, abdomen and pelvis with intravenous and     oral contrast.             FINDINGS:             CHEST:  There is a new 6 mm noncalcified nodule posteriorly in the superior     aspect of the left       upper lobe.  Two adjacent 3 mm noncalcified nodules in the right middle lobe     There are no enlarged       mediastinal, axillary or hilar lymph nodes.  Bilateral gynecomastia is present.     The thoracic aorta       has a normal caliber.  There are no pleural effusions.  Coronary artery     calcification is present.        Nodular areas of infiltrate in the lower lobes is present possibly related to     aspiration.                   ABDOMEN:  There are new hypodense hepatic masses measuring up to 2 cm suspicious    for worsening up       attic metastatic disease.  The spleen, pancreas and adrenal glands are normal.      Gallstones are       present in the gallbladder.  No evidence of solid renal mass or hydronephrosis.     There is no       adenopathy or ascites.             PELVIS:  No evidence of bowel obstruction, perforation or abscess.  No CT     evidence of colitis.  The       appendix and terminal ileum are normal.  The abdominal aorta has a normal     caliber.  L5 pars defects       are present with grade 1 anterolisthesis.  There is no adenopathy or ascites.      No discrete urinary       bladder masses are identified on today's exam.  Advanced degenerative changes     are present in the       hips left greater than right.             IMPRESSION:       1. Noncalcified pulmonary nodules measuring up to 6 mm suspicious for pulmonary     metastatic lesions.             2. New hypodense ill-defined up  attic masses measuring up to 2 cm consistent     with metastatic       lesions.             3. Cholelithiasis.             4. Nodular areas of infiltrate in the lower lobes possibly related to chronic     aspiration.               CATY FLORES MD             Electronically Signed and Approved By: CATY FLORES MD on 8/24/2020 at 14:02            PAST, FAMILY   Past Medical History      Past Medical History:  Arthritis, Heart Attack, Hypertension      Hematology/Oncology (M):  GI Cancer, Oral Cancer, Skin Cancer            Past Surgical History      Biopsy (LIVER), Coronary Stent, Joint Replacement, Skin Cancer Removal            Family History      Family History:  Uterine Cancer            Social History      Marital Status:        Lives independently:  Yes      Number of Children:  3      Occupation:  RETIRED            Tobacco Use      Tobacco status:  Former smoker            Alcohol Use      Alcohol intake:  None            Substance Use      Substance use:  Denies use            REVIEW OF SYSTEMS      General:  Admits: Fatigue      Eye:  Admits Corrective Lenses      ENT:  Admits Hearing Loss      Cardiovascular:  Denies Chest Pain      Respiratory:  Denies: Cough, Shortness of Air      Gastrointestinal:  Denies Nausea/Vomiting      Musculoskeletal:  Admits Muscle Pain, Admits Painful Joints      Neurologic:  Admits Numbness\Tingling            VITAL SIGNS AND SCORES      Vitals      Weight 258 lbs 6.066 oz / 117.2 kg      Temperature 97.9 F / 36.61 C - Temporal      Pulse 63      Respirations 16      Blood Pressure 154/90 Sitting, Left Arm      Pulse Oximetry 96%, RM AIR            Pain Score      Pain Scale Used:  Numerical      Pain Intensity:  8            Fatigue Score      Fatigue (0-10 scale):  5            EXAM      General Appearance:  Positive for: Alert, Cooperative;          Negative for: Acute Distress      Eye:  Positive for: Anicteric Sclerae, Moist Conjunctiva      Neck:  Positive  for: Supple;          Negative for: JVD, Masses      Respiratory:  Positive for: CTAB, Normal Respiratory Effort      Chest:  Port in Place      Abdomen/Gastro:  Positive for: Normal Active Bowel Sounds, Soft;          Negative for: Distention, Hepatosplenomegaly, Tenderness      Cardiovascular:  Positive for: Peripheral Edema (2+ bilateral lower extremity),     RRR;          Negative for: Gallop, Murmur, Rub      Psychiatric:  Positive for: Appropriate Affect, Intact Judgement      Lymphatic:  Negative for: Cervical, Infraclavicular, Supraclavicular            PREVENTION      Hx Influenza Vaccination:  Yes      Date Influenza Vaccine Given:  Nov 1, 2019      2 or More Falls in Past Year?:  No      Fall Past Year with Injury?:  No      Encouraged to follow-up with:  PCP regarding preventative exams.      Chart initiated by      DIANA COSME MA            ALLERGY/MEDS      Allergies      Coded Allergies:             NO KNOWN DRUG ALLERGIES (Verified  Allergy, Unknown, 8/26/20)            Medications      Last Reconciled on 8/26/20 15:40 by DEBBIE TAVAREZ      Potassium Chloride (K-Tab*) 10 Meq Tablet.er      20 MEQ PO QDAY for low potassium for 30 Days, #60 TAB 3 Refills         Prov: DEBBIE TAVAREZ         8/12/20       Levothyroxine (Synthroid) 0.025 Mg      0.0125 MG PO QDAY@07, #15 TAB 0 Refills         Reported         8/12/20       Mouthwash (Magic Mouthwash) 1 Each Bottle      15 ML PO Q4H PRN for MOUTH DISCOMFORT, #300 ML 1 Refill         Prov: DEBBIE TAVAREZ         5/20/20       Irbesartan (Irbesartan) 300 Mg Tablet      300 MG PO QDAY for 30 Days, #30 TAB         Reported         1/29/20       Pyridoxine (Vitamin B6) 100 Mg Tablet      100 MG PO BID, TAB         Reported         11/13/19       DULoxetine (Cymbalta) 20 Mg Capsule.dr      20 MG PO QDAY for 30 Days, #30 CAP 5 Refills         Prov: DEBBIE TAVAREZ         11/6/19       Methotrexate Sodium (Methotrexate) 2.5 Mg Tab      12.5 MG PO Watson, TAB          Reported         11/6/19       (herbs)   No Conflict Check               Reported         10/2/19       Chlorthalidone (CHLORTHALIDONE) 25 Mg Tablet      12.5 MG PO QDAY for 30 Days, #15 TAB         Reported         10/2/19       Telmisartan (Micardis) 20 Mg Tab      20 MG PO QDAY, TAB         Reported         7/31/19       Allopurinol (Allopurinol) 300 Mg Tablet      300 MG PO QDAY, #30 TAB 0 Refills         Reported         6/12/19       Ubidecarenone (Co Q-10) 1 Each Capsule      100 MG PO QDAY, CAP         Reported         5/15/19       Folic Acid (Folic Acid) 1 Mg Tablet      1 MG PO QDAY, #30 TAB 0 Refills         Reported         5/1/19       Lidocaine/Prilocaine (Emla) 30 Gm Cream..g.      1 APL TOPICAL ONCE for apply to port site w/chemo, #2 GM 6 Refills         Prov: RUBA JUNE         2/26/19       Prochlorperazine Maleate (Prochlorperazine Maleate) 10 Mg Tab      10 MG PO Q6H PRN for NAUSEA AND/OR VOMITING for 30 Days, #120 TAB 3 Refills         Prov: DEBBIE TAVAREZ         2/13/19       Ondansetron Hcl (ONDANSETRON HCL) 4 Mg Tablet      8 MG PO Q8H PRN for NAUSEA AND/OR VOMITING for 30 Days, #180 TAB 3 Refills         Prov: DEBBIE TAVAREZ         2/13/19       Aspirin Chew (Aspirin Baby) 81 Mg Tab.chew      81 MG PO QDAY, #30 TAB.CHEW 0 Refills         Reported         2/13/19       Furosemide* (Lasix*) 40 Mg Tablet      60 MG PO QDAY, #30 TAB 0 Refills         Reported         2/13/19       Metoprolol Tartrate (Metoprolol Tartrate) 25 Mg Tablet      25 MG PO BID, #60 TAB 0 Refills         Reported         2/13/19       prednisoLONE (prednisoLONE) 5 Mg Tab      5 MG PO QDAY, TAB         Reported         2/13/19       Pantoprazole (Protonix) 20 Mg Tablet      40 MG PO BIDAC, #120 TAB 0 Refills         Reported         2/13/19      Medications Reviewed:  No Changes made to meds            IMPRESSION/PLAN      Diagnosis      Malignant neoplasm of lower third of esophagus - C15.5      Patient is  currently on third line Cyramza.  He is tolerating the infusions     well.  Unfortunately, restaging CT scans show multiple new lesions in the lung,     liver and lymph nodes.  This represents progression of his disease.  He     continues to have good performance status, ECOG PS 0.  We discussed options at     this point which would be standard systemic chemotherapy such as single agent     irinotecan 150 mg/m ² on days 1, day 15 of a 28-day cycle repeated until     intolerance or progression.  Oral Lonsurf has some activity in small studies.      He did not have any obvious actionable mutations on prior next generation     sequencing.  We discussed clinical trials which we do not have here locally but     may be available at regional [facility].  We discussed palliative care     focusing on quality of life as opposed to directly addressing the cancer.  He is     interested in further treatment.  He does understand that the likelihood of     response to fourth line therapy is quite small but not 0.  Our research nurse,     Nemo will look into regional clinical trials available.  I will plan to     initiate irinotecan in the interim.  I will see him back for cycle 2, day 1 with     labs prior.            Patient Education            Coping With Diarrhea Related to Chemotherapy      Irinotecan Injection      Patient Education Provided:  Yes            Electronically signed by DEBBIE TAVAREZ  08/26/2020 15:40       Disclaimer: Converted document may not contain table formatting or lab diagrams. Please see [employer] for the authenticated document.

## 2021-05-28 NOTE — PROGRESS NOTES
Patient: COLLEEN CONNOLLY     Acct: [id number]     Report: #[id number]  UNIT #: [id number]     : 1950    Encounter Date:2019  PRIMARY CARE: SATISH CASTRO  ***Signed***  --------------------------------------------------------------------------------------------------------------------  NURSE INTAKE      Visit Type      Established Patient Visit            Chief Complaint      esophageal ca      Intent of Therapy:  Palliative            Referring Provider/Copies To      Provider Not Found in Lookup:  DANA RODRIGUEZ      Primary Care Provider:  SATISH CASTRO            History and Present Illness      Past Oncology Illness History      Mr. Connolly is a 68-year-old white male who presents today for evaluation and     discussion of treatment options for his recently diagnosed esophageal cancer.      Patient states that this was initially identified when he was noted to be iron     deficient by his rheumatologist who was doing routine blood work for follow-up     of his methotrexate and rheumatoid arthritis treatment.  He underwent     colonoscopy which was benign as well as EGD at his local hospital that showed a     lesion in the distal esophagus.  He was seen by surgical oncology at [facility], Dr. Do.  Repeat EGD confirmed lesion in the lower     esophagus/GE junction area.  Biopsies showed marked atypia.  Surgery was     considered but patient and family desired a second opinion.  They were then seen    by CT surgery at [facility], Dr. Girard who repeated the     testing and this time biopsy was positive for adenocarcinoma.  He underwent     staging endoscopic ultrasound which identified a lesion in the distal esophagus,    GE junction, cardia of the stomach.  A cardia lymph node was also positive on     FNA giving him N1 disease.  His [facility] physicians have recommended ne    oadjuvant concurrent chemo RT followed by resection based on the trial CROSS      trial.  Patient states that he has had no issues with swallowing, dysphagia,     stomach or chest pain, weight loss, nausea or vomiting.  He continues to take     methotrexate for his rheumatoid arthritis and has considerable difficulties     especially with his hands.  He reports that he has recently completed     intravenous iron therapy.      Upon simulation for esophageal radiation-an area was found on the liver--right     lobe of liver.  MRI liver showed 9 mm lesion in the inferior right hepatic lobe     shows features suspicious for a single hepatic metastasis, particularly given     history of malignancy.            HPI - Oncology Interim      F/U for discussion of liver MRI findings.  Pt remains asymptomatic for esophage    al ca symptoms.  Informed of MRI results--difficult area to biopsy for     definitive dx; however, very characteristic for mets.  Discussed new chemo     regimen and need for port placement--pt would prefer Dr. Girard [facility].  Pt     w/o fever, lymphadenopathy or distress.            Cancer Details            Adenocarcinoma of gastroesophageal junction            Metastatic Sites      Liver            Clinical Staging      Stage IV            Treatments      Chemotherapy      FOLFOX            Clinical Trial Participant      No            ECOG Performance Status      0            Most Recent Lab Findings      Laboratory Tests      2/13/19 09:44            PAST, FAMILY   Past Medical History      Past Medical History:  Arthritis, Heart Attack, Hypertension      Hematology/Oncology (M):  Oral Cancer (ESOPHAGEAL CA), Skin Cancer            Past Surgical History      Biopsy (X 4), Coronary Stent, Joint Replacement, Skin Cancer Removal            Family History      Family History:  Uterine Cancer (MOTHER)            Social History      Marital Status:        Lives independently:  Yes      Number of Children:  3      Occupation:  RETIRED            Tobacco Use      Tobacco status:   Former smoker            Alcohol Use      Alcohol intake:  None            Substance Use      Substance use:  Denies use            REVIEW OF SYSTEMS      General:  Admits: Fatigue;          Denies: Appetite Change, Fever, Night Sweats, Weight Gain, Weight Loss      Eye:  Denies: Blurred Vision, Corrective Lenses, Diplopia, Eye Irritation, Eye     Pain, Eye Redness, Spots in Vision, Vision Loss      ENT:  Denies: Headache, Hearing Loss, Hoarseness, Seizures, Sinus Congestion,     Sore Throat      Cardiovascular:  Admits: Edema Legs;          Denies: Chest Pain, Edema Ankles, Irregular Heartbeat, Palpitations      Respiratory:  Denies: Coughing Blood, Productive Cough, Shortness of Air,     Wheezing      Gastrointestinal:  Denies: Bloody Stools, Constipation, Diarrhea, Frequent     Heartburn, Nausea, Problem Swallowing, Tarry Stools, Unable to Control Bowels,     Vomiting      Genitourinary (male):  Denies: Blood in Urine, Decrease Urine Stream, Frequent     Urination, Incontinence, Painful Urination      Musculoskeletal:  Admits: Painful Joints, Swollen Joints;          Denies: Back Pain, Leg Cramps, Muscle Pain, Muscle Weakness      Integumentary:  Denies: Bleeds Easily, Bruises Easily, Hair Changes, Jaundice,     Lesions, Mole Changes, Nail Changes, Pigment Changes, Rash, Skin Discoloration      Neurologic:  Denies: Dizziness, Fainting, Numbness\Tingling, Paralysis, Seizures      Psychiatric:  Denies: Anxiety, Confused, Depression, Disoriented, Memory Loss      Endocrine:  Denies: Cold Intolerance, Diabetes, Excessive Sweating, Excessive     Thirst, Excessive Urination, Heat Intolerance, Flushing, Hyperthyroidism,     Hypothyroidism      Hematologic/Lymphatic:  Denies: Bruising, Bleeding, Enlarged Lymph Nodes,     Recurrent Infections, Transfusions            VITAL SIGNS AND SCORES      Vitals      Height 5 ft 9.68 in / 177 cm      Weight 265 lbs 6.941 oz / 120.4 kg      BSA 2.35 m2      BMI 38.4 kg/m2       Temperature 98.5 F / 36.94 C - Temporal      Pulse 76      Respirations 18      Blood Pressure 136/73 Sitting, Right Arm      Pulse Oximetry 95%, RM AIR            Pain Score      Pain Scale Used:  Numerical      Pain Intensity:  5            Fatigue Score      Fatigue (0-10 scale):  3            EXAM      General Appearance:  Positive for: Alert, Oriented x3, Cooperative;          Negative for: Acute Distress      Neck:  Positive for: Supple;          Negative for: JVD, Masses      Respiratory:  Positive for: CTAB, Normal Respiratory Effort      Abdomen/Gastro:  Positive for: Normal Active Bowel Sounds, Soft;          Negative for: Distention, Hepatosplenomegaly, Tenderness      Cardiovascular:  Positive for: RRR;          Negative for: Gallop, Murmur, Peripheral Edema, Rub      Psychiatric:  Positive for: Appropriate Affect, Intact Judgement      Other      Active synovitis on bilateral hands-metacarpal phalangeal joints and PIPs      Lymphatic:  Negative for: Axillary, Cervical, Infraclavicular, Supraclavicular            PREVENTION      Date Influenza Vaccine Given:  Dec 1, 2018      2 or More Falls Past Year?:  No      Fall Past Year with Injury?:  No      Encouraged to follow-up with:  PCP regarding preventative exams.      Chart initiated by      DIANA COSME MA            ALLERGY/MEDS      Allergies      Coded Allergies:             NO KNOWN DRUG ALLERGIES (Verified  Allergy, Unknown, 2/26/19)            Medications      Last Reconciled on 2/26/19 15:34 by YADI JC      Prochlorperazine Maleate (Prochlorperazine Maleate) 10 Mg Tab      10 MG PO Q6H PRN for NAUSEA AND/OR VOMITING for 30 Days, #120 TAB 3 Refills         Prov: DEBBIE TAVAREZ         2/13/19       Ondansetron HCl (Zofran*) 4 Mg Tablet      8 MG PO Q8H PRN for NAUSEA AND/OR VOMITING for 30 Days, #180 TAB 3 Refills         Prov: DEBBIE TAVAREZ         2/13/19       Aspirin Chew (Aspirin Baby) 81 Mg Tab.chew      81 MG PO QDAY, #30  TAB.CHEW 0 Refills         Reported         2/13/19       Methotrexate Sodium (Methotrexate) 2.5 Mg Tab      2.5 MG PO Fr, TAB         Reported         2/13/19       Allopurinol (Zyloprim*) 100 Mg Tablet      100 MG PO BID for 30 Days, #60 TAB 0 Refills         Reported         2/13/19       Furosemide* (Lasix*) 40 Mg Tablet      40 MG PO QDAY, #30 TAB 0 Refills         Reported         2/13/19       Pravastatin Sod (Pravastatin*) 40 Mg Tablet      80 MG PO QDAY, #60 TAB 0 Refills         Reported         2/13/19       amLODIPine (amLODIPine) 2.5 Mg Tablet      5 MG PO QDAY, #60 TAB 0 Refills         Reported         2/13/19       Metoprolol Tartrate (Metoprolol Tartrate*) 25 Mg Tablet      25 MG PO BID, #60 TAB 0 Refills         Reported         2/13/19       Prednisolone* (Prednisolone*) 5 Mg Tab      5 MG PO QDAY, TAB         Reported         2/13/19       Telmisartan (Micardis*) 40 Mg Tablet      80 MG PO QDAY, TAB         Reported         2/13/19       Pantoprazole (Protonix*) 20 Mg Tablet      40 MG PO BIDAC, #120 TAB 0 Refills         Reported         2/13/19      Medications Reviewed:  No Changes made to meds            IMPRESSION/PLAN      Impression      Esophageal cancer, metastatic.  Rheumatoid arthritis.            Diagnosis            Esophageal cancer         Malignant neoplasm of lower third of esophagus - C15.5         Malignant neoplasm of esophagus location: lower third      Patient had recent MRI which shows a new lesion in the liver consistent with     metastatic disease.  This makes him stage IV.  We discussed that that is     generally considered incurable although there is a small percentage of patients     that have excellent response to systemic therapy that can then have salvage     resection and/or definitive radiation to clean up all remaining disease.  He is     in good health, ECOG PS 0.  I will request  mutation testing including     HER-2/cristhian which may provide additional  treatment options.  With regard to     standard care, I would recommend the FOLFOX regimen consisting of 5-fluorour    acil, leucovorin, oxaliplatin given is a 3-day regimen every 2 weeks until     progression or intolerance.  Side effects, risk and benefits discussed in     detail.  Written teaching information provided.  He will be referred to surgery     for Port-A-Cath placement.  I will plan his initial cycle in the near future.  I    will see him back for cycle 2, day 1 with labs prior.  Plan restaging scans     after cycle 4.  Prescription for EMLA cream provided for port access            Rheumatoid arthritis         Rheumatoid arthritis involving both hands, unspecified rheumatoid factor        presence - M06.9         Rheumatoid arthritis location: hand         Rheumatoid factor presence: unspecified presence         Laterality: bilateral      Patient can continue methotrexate weekly until he starts systemic therapy at     which point I would have him hold the methotrexate.            Notes      New Medications      * Lidocaine/Prilocaine (Emla) 30 GM CREAM..G.: 1 APL TOPICAL ONCE apply to port       site w/chemo #2      New Referrals      * Surgery, As Soon As Possible         contreras         Dx: Metastasis from esophageal cancer - C79.9, C15.9            Patient Education            Fluorouracil Injection      Leucovorin      Oxaliplatin Injection      Patient Education Provided:  Yes                 Disclaimer: Converted document may not contain table formatting or lab diagrams. Please see [employer] [facility] for the authenticated document.

## 2021-05-28 NOTE — PROGRESS NOTES
Patient: COLLEEN CONNOLLY     Acct: [id number]     Report: #[id number]  UNIT #: [id number]     : 1950    Encounter Date:2020  PRIMARY CARE: SATISH CASTRO  ***Signed***  --------------------------------------------------------------------------------------------------------------------  NURSE INTAKE      Visit Type      Established Patient Visit            Chief Complaint      CHEMO TX      Intent of Therapy:  Palliative            Referring Provider/Copies To      Primary Care Provider:  SATISH CASTRO      Copies To:   SATISH CASTRO            History and Present Illness      Past Oncology Illness History      Mr. Connolly is a 69-year-old white male who presents with esophageal cancer.      Patient states that this was initially identified when he was noted to be iron     deficient by his rheumatologist who was doing routine blood work for follow-up     of his methotrexate and rheumatoid arthritis treatment.  He was seen by surgical    oncology at [facility], Dr. Do.  Repeat EGD confirmed lesion     in the lower esophagus/GE junction area.  Biopsies showed marked atypia.      Surgery was considered but patient and family desired a second opinion.  They     were then seen by CT surgery at [facility], Dr. Girard who     repeated the testing and this time biopsy was positive for adenocarcinoma.  He     underwent staging endoscopic ultrasound which identified a lesion in the distal     esophagus, GE junction, cardia of the stomach.  A cardia lymph node was also     positive on FNA giving him N1 disease.  His [place] physicians have     recommended neoadjuvant concurrent chemo RT followed by resection based on the     trial CROSS trial.  Upon simulation for esophageal radiation-an area was found     on the liver--right lobe of liver.  MRI liver showed 9 mm lesion in the inferior    right hepatic lobe shows features suspicious for a single hepatic metastasis,      particularly given history of malignancy.4/22/20 Cycle 10 day 15 held due to     increased productive cough. 10 days of antibiotics prescribed.            HPI - Oncology Interim      Patient presents today for cycle 12 of Taxol plus Cyramza.  He states he is     tolerating his infusions well.  He states he is feeling pretty good.  He does     have increased numbness and tingling in the hands and feet although he does not     feel that it is interfering with his activities.  He denies any nausea,     vomiting, dysphagia.  His appetite is good and his weight is maintained.  He     reports that his rheumatoid arthritis has been doing well.  He continues     methotrexate for that.  He denies new masses or lymphadenopathy.  No unusual     aches or pains.  Denies issues from his Port-A-Cath.            Cancer Details            Adenocarcinoma of gastroesophageal junction            Metastatic Sites      Liver            Clinical Staging      Stage IV            Treatments      Chemotherapy      3/6/19-4/17/17 completed 4C FOLFOX  progression  5/22/19-7/3/19 completed 3C     Keytruda   progression  Cyramza/Taxol initiated 8/7/19            Clinical Trial Participant      No            ECOG Performance Status      0            Most Recent Lab Findings            Item Value  Date Time             Urine Collection Type Unknown NA 5/6/20 0911             Urine Color DK YELLOW NA 5/6/20 0911             Urine Appearance CLEAR NA 5/6/20 0911             Urine pH 7.0 (pH) 5/6/20 0911             Urine Specific Gravity 1.011 NA 5/6/20 0911             Urine Protein NEGATIVE mg/dL 5/6/20 0911             Urine Glucose (UA) NEGATIVE mg/dL 5/6/20 0911             Urine Ketones NEGATIVE mg/dL 5/6/20 0911             Urine Occult Blood NEGATIVE NA 5/6/20 0911             Urine Nitrite NEGATIVE NA 5/6/20 0911             Urine Bilirubin NEGATIVE NA 5/6/20 0911             Urine Urobilinogen 0.2 {EhrlichU}/dL 5/6/20 0911              Urine Leukocyte Esterase NEGATIVE NA 5/6/20 0911            Item Value  Date Time             Sodium Level 138 mmol/L 6/3/20 0943             Potassium Level 3.9 mmol/L 6/3/20 0943             Chloride Level 103 mmol/L 6/3/20 0943             Carbon Dioxide Level 27 mmol/L 6/3/20 0943             Anion Gap 12 mmol/L 6/3/20 0943             Blood Urea Nitrogen 16 mg/dL 6/3/20 0943             Creatinine 0.92 mg/dL 6/3/20 0943             Glomerular Filtration Rate Calc >60 mL/min/{1.73_m2} 6/3/20 0943             BUN/Creatinine Ratio 17 {ratio} 6/3/20 0943             Calculated Osmolality 287 6/3/20 0943             Calcium Level 9.3 mg/dL 6/3/20 0943             Glucose Level 102 mg/dL H 6/3/20 0943             Total Bilirubin 0.41 mg/dL 6/3/20 0943             Alanine Aminotransferase 14 U/L 6/3/20 0943             Aspartate Amino Transf (AST/SGOT) 18 U/L 6/3/20 0943            Laboratory Tests      6/3/20 09:02            PAST, FAMILY   Past Medical History      Past Medical History:  Arthritis, Heart Attack, Hypertension      Hematology/Oncology (M):  GI Cancer, Oral Cancer, Skin Cancer            Past Surgical History      Biopsy (LIVER), Coronary Stent, Joint Replacement, Skin Cancer Removal            Family History      Family History:  Uterine Cancer            Social History      Marital Status:        Lives independently:  Yes      Number of Children:  3      Occupation:  RETIRED            Tobacco Use      Tobacco status:  Former smoker            Alcohol Use      Alcohol intake:  None            Substance Use      Substance use:  Denies use            REVIEW OF SYSTEMS      General:  Admits: Fatigue;          Denies: Weight Loss      Eye:  Admits Corrective Lenses      Respiratory:  Admits: Shortness of Air;          Denies: Cough      Musculoskeletal:  Denies Painful Joints      Neurologic:  Denies Dizziness; Admits Numbness\Tingling      Hematologic/Lymphatic:  Denies Bruising, Denies  Enlarged Lymph Nodes            VITAL SIGNS AND SCORES      Vitals      Weight 260 lbs 5.812 oz / 118.1 kg      Temperature 98.9 F / 37.17 C - Temporal      Pulse 88      Respirations 16      Blood Pressure 140/70 Sitting      Pulse Oximetry 93%, RM AIR            Pain Score      Pain Scale Used:  Numerical      Pain Intensity:  0            Fatigue Score      Fatigue (0-10 scale):  5            EXAM      General Appearance:  Positive for: Alert, Cooperative;          Negative for: Acute Distress      Neck:  Positive for: Supple;          Negative for: JVD, Masses      Respiratory:  Positive for: CTAB, Normal Respiratory Effort      Chest:  Port in Place      Abdomen/Gastro:  Positive for: Normal Active Bowel Sounds, Soft;          Negative for: Distention, Hepatosplenomegaly, Tenderness      Cardiovascular:  Positive for: Peripheral Edema (Trace bilateral lower     extremity), RRR;          Negative for: Gallop, Murmur, Rub      Psychiatric:  Positive for: Appropriate Affect, Intact Judgement      Lymphatic:  Negative for: Cervical, Infraclavicular, Supraclavicular            PREVENTION      Hx Influenza Vaccination:  Yes      Date Influenza Vaccine Given:  Nov 1, 2019      2 or More Falls Past Year?:  No      Fall Past Year with Injury?:  No      Encouraged to follow-up with:  PCP regarding preventative exams.      Chart initiated by      DIANA COSME MA            ALLERGY/MEDS      Allergies      Coded Allergies:             NO KNOWN DRUG ALLERGIES (Verified  Allergy, Unknown, 6/3/20)            Medications      Last Reconciled on 6/3/20 13:04 by DEBBIE TAVAREZ      Mouthwash (Magic Mouthwash) 1 Each Bottle      15 ML PO Q4H PRN for MOUTH DISCOMFORT, #300 ML 1 Refill         Prov: DEBBIE TAVAREZ         5/20/20       Potassium Chloride (K-Tab*) 10 Meq Tablet.er      20 MEQ PO QDAY for low potassium for 30 Days, #60 TAB 3 Refills         Prov: DEBBIE TAVAREZ         5/18/20       Irbesartan (Irbesartan) 300 Mg  Tablet      300 MG PO QDAY for 30 Days, #30 TAB         Reported         1/29/20       Pyridoxine (Vitamin B6) 100 Mg Tablet      100 MG PO BID, TAB         Reported         11/13/19       DULoxetine (Cymbalta) 20 Mg Capsule.dr      20 MG PO QDAY for 30 Days, #30 CAP 5 Refills         Prov: DEBBIE TAVAREZ         11/6/19       Methotrexate Sodium (Methotrexate) 2.5 Mg Tab      12.5 MG PO Watson, TAB         Reported         11/6/19       (herbs)   No Conflict Check               Reported         10/2/19       Chlorthalidone (CHLORTHALIDONE) 25 Mg Tablet      12.5 MG PO QDAY for 30 Days, #15 TAB         Reported         10/2/19       Telmisartan (Micardis) 20 Mg Tab      20 MG PO QDAY, TAB         Reported         7/31/19       Allopurinol (Allopurinol) 300 Mg Tablet      300 MG PO QDAY, #30 TAB 0 Refills         Reported         6/12/19       Ubidecarenone (Co Q-10) 1 Each Capsule      100 MG PO QDAY, CAP         Reported         5/15/19       Folic Acid (Folic Acid) 1 Mg Tablet      1 MG PO QDAY, #30 TAB 0 Refills         Reported         5/1/19       Lidocaine/Prilocaine (Emla) 30 Gm Cream..g.      1 APL TOPICAL ONCE for apply to port site w/chemo, #2 GM 6 Refills         Prov: RUBA JUNE         2/26/19       Prochlorperazine Maleate (Prochlorperazine Maleate) 10 Mg Tab      10 MG PO Q6H PRN for NAUSEA AND/OR VOMITING for 30 Days, #120 TAB 3 Refills         Prov: DEBBIE TAVAREZ         2/13/19       Ondansetron Hcl (ONDANSETRON HCL) 4 Mg Tablet      8 MG PO Q8H PRN for NAUSEA AND/OR VOMITING for 30 Days, #180 TAB 3 Refills         Prov: DEBBIE ATVAREZ         2/13/19       Aspirin Chew (Aspirin Baby) 81 Mg Tab.chew      81 MG PO QDAY, #30 TAB.CHEW 0 Refills         Reported         2/13/19       Furosemide* (Lasix*) 40 Mg Tablet      40 MG PO QDAY, #30 TAB 0 Refills         Reported         2/13/19       Metoprolol Tartrate (Metoprolol Tartrate) 25 Mg Tablet      25 MG PO BID, #60 TAB 0 Refills         Reported          2/13/19       prednisoLONE (prednisoLONE) 5 Mg Tab      5 MG PO QDAY, TAB         Reported         2/13/19       Pantoprazole (Protonix) 20 Mg Tablet      40 MG PO BIDAC, #120 TAB 0 Refills         Reported         2/13/19      Medications Reviewed:  No Changes made to meds            IMPRESSION/PLAN      Diagnosis      Malignant neoplasm of lower third of esophagus - C15.5      Patient is due for cycle 12.  Lab work is adequate for treatment.  He is having     increased difficulty with neuropathy.  I will complete this cycle and then plan     to omit Taxol from further treatment.  He will continue Cyramza as a single     agent maintenance therapy.            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            Patient Education      Patient Education Provided:  Yes            Electronically signed by DEBBIE TAVAREZ  06/03/2020 13:04       Disclaimer: Converted document may not contain table formatting or lab diagrams. Please see [employer] [facility] for the authenticated document.

## 2021-05-28 NOTE — PROGRESS NOTES
Patient: COLLEEN CONNOLLY     Acct: [id number]     Report: #[id number]  UNIT #: [id number]     : 1950    Encounter Date:2021  PRIMARY CARE: SATISH CASTRO  ***Signed***  --------------------------------------------------------------------------------------------------------------------  NURSE INTAKE      Visit Type      Established Patient Visit            Chief Complaint      ESOPHAGEAL CA F/U      Intent of Therapy:  Palliative            Referring Provider/Copies To      Primary Care Provider:  SATISH CASTRO      Copies To:   SATISH CASTRO            History and Present Illness      Past Oncology Illness History      Mr. Connolly is a 70-year-old white male who presents with esophageal cancer.  Addy rain states that this was initially identified when he was noted to be iron     deficient by his rheumatologist who was doing routine blood work for follow-up     of his methotrexate and rheumatoid arthritis treatment.  He was seen by surgical    oncology at [facility], Dr. Do.  Repeat EGD confirmed lesion     in the lower esophagus/GE junction area.  Biopsies showed marked atypia.      Surgery was considered but patient and family desired a second opinion.  They     were then seen by CT surgery at [facility], Dr. Girard who     repeated the testing and this time biopsy was positive for adenocarcinoma.  He     underwent staging endoscopic ultrasound which identified a lesion in the distal     esophagus, GE junction, cardia of the stomach.  A cardia lymph node was also     positive on FNA giving him N1 disease.  His [facility] physicians have     recommended neoadjuvant concurrent chemo RT followed by resection based on the     trial CROSS trial.  Upon simulation for esophageal radiation-an area was found     on the liver--right lobe of liver.  MRI liver showed 9 mm lesion in the inferior    right hepatic lobe shows features suspicious for a single hepatic  metastasis,     particularly given history of malignancy.4/22/20 Cycle 10 day 15 held due to     increased productive cough. 10 days of antibiotics prescribed.  8/26/20     progression stopped Cyramza/Taxol started Irinotecan. 9/30/2020 patient     experience a significant amount of diarrhea after second treatment which     required hospitalization. Irinotecan stopped and Lonsurf initiated. 12/9/20 CT     scans revealed progression. Referred to [facility] for clinical trial options.            [facility] - Oncology Interim      Patient returns today for follow-up of his esophageal cancer.  He is metastatic.     He status post multiple lines of therapy.  Recently found to have progression.     Since his last visit, he was evaluated at [facility].  They had no    recommendations and unfortunately did not have a clinical trial that he would     qualify.  His case has been sent to [facility] cancer network in [place] but    again they did not have a trial for which he was a candidate.  On return today,     he states he is feeling pretty well.  He does report more trouble with     swallowing and feels that he has choking on occasion.  He is still able to eat     and drink and his weight is maintained.  He denies any masses or     lymphadenopathy.  He has a good performance status, ECOG PS 0.            Cancer Details            Adenocarcinoma of gastroesophageal junction            Clinical Staging      Stage IV            Treatments      Chemotherapy      3/6/19-4/17/17 completed 4C FOLFOX  progression  5/22/19-7/3/19 completed 3C     Keytruda   progression  Cyramza/Taxol initiated 8/7/19 8/26/20 progression     stopped Cyramza/Taxol started Irinotecan. 9/30/20 Irinitecan stopped and Lonsurf    initiated.            Clinical Trial Participant      No            ECOG Performance Status      0            PAST, FAMILY   Past Medical History      Past Medical History:  Arthritis, Heart Attack, Hypertension       Hematology/Oncology (M):  GI Cancer, Oral Cancer, Skin Cancer            Past Surgical History      Biopsy, Coronary Stent, Joint Replacement, Skin Cancer Removal            Family History      Family History:  Uterine Cancer            Social History      Lives independently:  Yes      Number of Children:  3      Occupation:  RETIRED            Tobacco Use      Tobacco status:  Former smoker            Substance Use      Substance use:  Denies use            REVIEW OF SYSTEMS      General:  Admits: Fatigue;          Denies: Fever      Eye:  Admits Corrective Lenses      ENT:  Admits Hearing Loss      Cardiovascular:  Denies Chest Pain      Gastrointestinal:  Denies Diarrhea, Denies Nausea/Vomiting      Musculoskeletal:  Denies Muscle Pain, Denies Painful Joints      Hematologic/Lymphatic:  Denies Enlarged Lymph Nodes            VITAL SIGNS AND SCORES      Vitals      Weight 253 lbs 8.464 oz / 115 kg      Temperature 97.1 F / 36.17 C - Temporal      Pulse 68      Respirations 16      Blood Pressure 135/73 Sitting, Left Arm      Pulse Oximetry 96%, RM AIR            Pain Score      Pain Scale Used:  Numerical      Pain Intensity:  0            Fatigue Score      Fatigue (0-10 scale):  0 (none)            EXAM      General Appearance:  Positive for: Alert, Cooperative;          Negative for: Acute Distress      Eye:  Positive for: Anicteric Sclerae, Moist Conjunctiva      Neck:  Positive for: Supple;          Negative for: JVD, Masses      Respiratory:  Positive for: CTAB, Normal Respiratory Effort      Chest:  Port in Place      Abdomen/Gastro:  Positive for: Normal Active Bowel Sounds, Soft;          Negative for: Distention, Hepatosplenomegaly, Tenderness      Cardiovascular:  Positive for: RRR;          Negative for: Gallop, Murmur, Peripheral Edema, Rub      Psychiatric:  Positive for: Appropriate Affect, Intact Judgement      Lymphatic:  Negative for: Cervical, Infraclavicular, Supraclavicular             PREVENTION      Hx Influenza Vaccination:  Yes      Date Influenza Vaccine Given:  Nov 1, 2020      2 or More Falls in Past Year?:  No      Fall Past Year with Injury?:  No      Encouraged to follow-up with:  PCP regarding preventative exams.      Chart initiated by      DIANA COSME MA            ALLERGY/MEDS      Allergies      Coded Allergies:             NO KNOWN DRUG ALLERGIES (Verified  Allergy, Unknown, 1/7/21)            Medications      Last Reconciled on 1/8/21 10:17 by DEBBIE TAVAREZ      Magnesium Oxide (Magnesium Oxide*) 400 Mg Tablet      400 MG PO QDAY for 30 Days, #30 TAB 4 Refills         Prov: DEBBIE TAVAREZ         12/10/20       Potassium Chloride (K-Tab*) 10 Meq Tablet.er      20 MEQ PO QDAY for low potassium for 30 Days, #60 TAB 3 Refills         Prov: DEBBIE TAVAREZ         11/10/20       Trifluridine/Tipiracil HCl (Lonsurf 20 mg-8.19 mg Tablet) 1 Each Tablet      4 TAB PO BID for 10 Days, #80 TAB 5 Refills         Prov: DEBBIE TAVAREZ         9/30/20       Diphenoxylate/Atropine (Diphenoxylate/Atropine) 1 Each Tablet      2.5 MG PO QID for diarrhea, #60 TAB 0 Refills         Prov: DEBBIE TAVAREZ         9/17/20       Prochlorperazine Maleate (Prochlorperazine Maleate) 10 Mg Tab      10 MG PO Q6H PRN for NAUSEA AND/OR VOMITING for 30 Days, #120 TAB 3 Refills         Prov: DEBBIE TAVAREZ         9/10/20       Levothyroxine (Synthroid) 0.025 Mg      0.0125 MG PO QDAY@07, #15 TAB 0 Refills         Reported         8/12/20       Mouthwash (Magic Mouthwash) 1 Each Bottle      15 ML PO Q4H PRN for MOUTH DISCOMFORT, #300 ML 1 Refill         Prov: DEBBIE TAVAREZ         5/20/20       Irbesartan (Irbesartan) 300 Mg Tablet      300 MG PO QDAY for 30 Days, #30 TAB         Reported         1/29/20       Pyridoxine (Vitamin B6) 100 Mg Tablet      100 MG PO BID, TAB         Reported         11/13/19       Methotrexate Sodium (Methotrexate) 2.5 Mg Tab      12.5 MG PO Watson, TAB         Reported         11/6/19        (herbs)   No Conflict Check               Reported         10/2/19       Chlorthalidone (CHLORTHALIDONE) 25 Mg Tablet      12.5 MG PO QDAY for 30 Days, #15 TAB         Reported         10/2/19       Allopurinol (Allopurinol) 300 Mg Tablet      300 MG PO QDAY, #30 TAB 0 Refills         Reported         6/12/19       Ubidecarenone (Co Q-10) 1 Each Capsule      100 MG PO QDAY, CAP         Reported         5/15/19       Folic Acid (Folic Acid) 1 Mg Tablet      1 MG PO QDAY, #30 TAB 0 Refills         Reported         5/1/19       Lidocaine/Prilocaine (Emla) 30 Gm Cream..g.      1 APL TOPICAL ONCE for apply to port site w/chemo, #2 GM 6 Refills         Prov: RUBA JUNE         2/26/19       Ondansetron Hcl (ONDANSETRON HCL) 4 Mg Tablet      8 MG PO Q8H PRN for NAUSEA AND/OR VOMITING for 30 Days, #180 TAB 3 Refills         Prov: DEBBIE TAVAREZ         2/13/19       Aspirin Chew (Aspirin Baby) 81 Mg Tab.chew      81 MG PO QDAY, #30 TAB.CHEW 0 Refills         Reported         2/13/19       Furosemide* (Lasix*) 40 Mg Tablet      60 MG PO QDAY, #30 TAB 0 Refills         Reported         2/13/19       Metoprolol Tartrate (Metoprolol Tartrate) 25 Mg Tablet      25 MG PO BID, #60 TAB 0 Refills         Reported         2/13/19       prednisoLONE (prednisoLONE) 5 Mg Tab      5 MG PO QDAY, TAB         Reported         2/13/19       Pantoprazole (Protonix) 20 Mg Tablet      40 MG PO BIDAC, #120 TAB 0 Refills         Reported         2/13/19      Medications Reviewed:  No Changes made to meds            IMPRESSION/PLAN      Diagnosis      Malignant neoplasm of lower third of esophagus - C15.5            Metastasis from esophageal cancer - C79.9, C15.9            Notes      Metastatic.  Patient is status post multiple lines of therapy and does not have     any other standard of care options.  We discussed hospice/palliative care     focusing on quality of life but he is not yet ready for that option.  He     continues to have  good performance status.  He is having more dysphagia and will     be referred to gastroenterology for consideration of EGD and possibly dilation     or stent based on findings.  I discussed his case with Dr. Galaviz from [facility] as well as Dr. Martin at [facility].  No clinical     trials are available regionally.  I did discuss his case with Dr. Reilly Celaya, surgical oncology at [facility].  He will review his images     to see if there is any liver directed therapies which may be of benefit.  The     liver does seem to be the place where he is having the most issue.  His     esophageal lesion although more symptomatic appears stable in the small     pulmonary nodules are likewise unchanged.  If Dr. SIERRA does feel that liver     directed therapy may be appropriate, he will be referred for that evaluation.  I    n the interim, our clinical trial research will continue looking for regional or     national trials for which he may be a candidate.  He will have port flush     today.      New Office Procedures      * Port Flush, 01/07/21         Dx: Metastasis from esophageal cancer - C79.9, C15.9      New Referrals      * Gastroenterology, As Soon As Possible         [facility]         Reason for Referral: stretching of esophagus. esophageal cancer         Dx: Metastasis from esophageal cancer - C79.9, C15.9            Pain      Pain Zero Today            Advanced Care Plan Discussion      Declines Discussion 1124F            Patient Education      Patient Education Provided:  Yes            Electronically signed by DEBBIE TAVAREZ  01/08/2021 10:17       Disclaimer: Converted document may not contain table formatting or lab diagrams. Please see [employer] for the authenticated document.

## 2021-05-28 NOTE — PROGRESS NOTES
Patient: COLLEEN CONNOLLY     Acct: [id number]     Report: #[id number]  UNIT #: [id number]     : 1950    Encounter Date:2019  PRIMARY CARE: SATISH CASTRO  ***Signed***  --------------------------------------------------------------------------------------------------------------------  NURSE INTAKE      Visit Type      Established Patient Visit            Chief Complaint      tx            Referring Provider/Copies To      Primary Care Provider:  SATISH CASTRO            History and Present Illness      Past Oncology Illness History      Mr. Connolly is a 68-year-old white male who presents today for evaluation and     discussion of treatment options for his recently diagnosed esophageal cancer.      Patient states that this was initially identified when he was noted to be iron     deficient by his rheumatologist who was doing routine blood work for follow-up     of his methotrexate and rheumatoid arthritis treatment.  He underwent     colonoscopy which was benign as well as EGD at his local hospital that showed a     lesion in the distal esophagus.  He was seen by surgical oncology at [facility], Dr. Do.  Repeat EGD confirmed lesion in the lower     esophagus/GE junction area.  Biopsies showed marked atypia.  Surgery was     considered but patient and family desired a second opinion.  They were then seen    by CT surgery at [facility], Dr. Girard who repeated the     testing and this time biopsy was positive for adenocarcinoma.  He underwent     staging endoscopic ultrasound which identified a lesion in the distal esophagus,    GE junction, cardia of the stomach.  A cardia lymph node was also positive on     FNA giving him N1 disease.  His [facility] physicians have recommended     neoadjuvant concurrent chemo RT followed by resection based on the trial CROSS     trial.  Patient states that he has had no issues with swallowing, dysphagia,     stomach or chest  pain, weight loss, nausea or vomiting.  He continues to take     methotrexate for his rheumatoid arthritis and has considerable difficulties     especially with his hands.  He reports that he has recently completed     intravenous iron therapy.      Upon simulation for esophageal radiation-an area was found on the liver--right     lobe of liver.  MRI liver showed 9 mm lesion in the inferior right hepatic lobe     shows features suspicious for a single hepatic metastasis, particularly given     history of malignancy.       Started on palliative FOLFOX            HPI - Oncology Interim      69-year-old white male with metastatic esophageal cancer who presents today for     ongoing treatment with Cyramza and Taxotere.  Patient states he has been     tolerating his medications fairly well.  He continues to have some neuropathy in    his feet especially but occasionally in the tips of his fingers.  At his last     visit, we have discussed the use of Cymbalta for neuropathy but he did not pick     up the prescription.  He states that he is eating and drinking well, no nausea     or vomiting.  His weight is maintained.  His energy level is adequate for his     daily needs.  He denies any issues from his Port-A-Cath.  He denies any masses     lymphadenopathy.            Cancer Details            Adenocarcinoma of gastroesophageal junction            Metastatic Sites      Liver            Clinical Staging      Stage IV            Treatments      Chemotherapy      3/6/19-4/17/17 completed 4C FOLFOX  progression  5/22/19-7/3/19 completed 3C     Keytruda   progression  Cyramza/Taxol initiated 8/7/19            Clinical Trial Participant      No            ECOG Performance Status      0            Most Recent Lab Findings      Laboratory Tests      10/30/19 09:00            11/6/19 06:25            PAST, FAMILY   Past Medical History      Past Medical History:  Arthritis, Heart Attack, Hypertension      Hematology/Oncology (M):   GI Cancer, Oral Cancer, Skin Cancer            Past Surgical History      Biopsy (LIVER), Coronary Stent, Joint Replacement, Skin Cancer Removal            Family History      Family History:  Uterine Cancer            Social History      Marital Status:        Lives independently:  Yes      Number of Children:  3      Occupation:  RETIRED            Tobacco Use      Tobacco status:  Former smoker            Alcohol Use      Alcohol intake:  None            Substance Use      Substance use:  Denies use            REVIEW OF SYSTEMS      General:  Admits: Fatigue, Weight Gain      Eye:  Admits Corrective Lenses      ENT:  Denies Headache; Admits Hearing Loss      Cardiovascular:  Denies Chest Pain      Respiratory:  Denies: Productive Cough, Shortness of Air      Gastrointestinal:  Denies Diarrhea, Denies Nausea/Vomiting      Musculoskeletal:  Admits Muscle Pain, Admits Painful Joints      Neurologic:  Denies Dizziness            VITAL SIGNS AND SCORES      Vitals      Weight 254 lbs 3.046 oz / 115.3 kg      Temperature 98 F / 36.67 C - Temporal      Pulse 64      Respirations 20      Blood Pressure 158/92 Sitting      Pulse Oximetry 96%, RM AIR            Pain Score      Pain Scale Used:  Numerical      Pain Intensity:  2            Fatigue Score      Fatigue (0-10 scale):  3            EXAM      General Appearance:  Positive for: Alert, Oriented x3, Cooperative;          Negative for: Acute Distress      Eye:  Positive for: Anicteric Sclerae, Moist Conjunctiva      Neck:  Positive for: Supple;          Negative for: JVD, Masses      Respiratory:  Positive for: CTAB, Normal Respiratory Effort      Chest:  Port in Place      Abdomen/Gastro:  Positive for: Normal Active Bowel Sounds, Soft;          Negative for: Distention, Hepatosplenomegaly, Tenderness      Cardiovascular:  Positive for: RRR;          Negative for: Gallop, Murmur, Peripheral Edema, Rub      Psychiatric:  Positive for: Appropriate Affect,  Intact Judgement      Lymphatic:  Negative for: Cervical, Infraclavicular, Supraclavicular            PREVENTION      Hx Influenza Vaccination:  Yes      Date Influenza Vaccine Given:  Nov 1, 2019      2 or More Falls Past Year?:  No      Fall Past Year with Injury?:  No      Encouraged to follow-up with:  PCP regarding preventative exams.      Chart initiated by      DIANA COSME MA            ALLERGY/MEDS      Allergies      Coded Allergies:             NO KNOWN DRUG ALLERGIES (Verified  Allergy, Unknown, 11/6/19)            Medications      Last Reconciled on 10/2/19 09:53 by YADI CJ      Pravastatin Sod (Pravastatin*) 40 Mg Tablet      40 MG PO QDAY, #30 TAB 0 Refills         Reported         11/6/19       Methotrexate Sodium (Methotrexate) 2.5 Mg Tab      12.5 MG PO Watson, TAB         Reported         11/6/19       Potassium Chloride (K-Tab*) 10 Meq Tablet.er      20 MEQ PO QDAY for low potassium for 30 Days, #60 TAB 3 Refills         Prov: DEBBIE TAVAREZ         10/2/19       (herbs)   No Conflict Check               Reported         10/2/19       Chlorthalidone (CHLORTHALIDONE) 25 Mg Tablet      12.5 MG PO QDAY for 30 Days, #15 TAB         Reported         10/2/19       PACLitaxel (taxOL) 6 Mg/1 Ml Vial      0 IV D 1, 8      Reported         7/31/19       (Cyramza)   No Conflict Check      IV D 1      Reported         7/31/19       Telmisartan (Micardis) 20 Mg Tab      20 MG PO QDAY, TAB         Reported         7/31/19       Allopurinol (Allopurinol) 300 Mg Tablet      300 MG PO QDAY, #30 TAB 0 Refills         Reported         6/12/19       Ubidecarenone (Co Q-10) 1 Each Capsule      100 MG PO QDAY, CAP         Reported         5/15/19       Folic Acid (Folic Acid*) 1 Mg Tablet      1 MG PO QDAY, #30 TAB 0 Refills         Reported         5/1/19       Lidocaine/Prilocaine (Emla) 30 Gm Cream..g.      1 APL TOPICAL ONCE for apply to port site w/chemo, #2 GM 6 Refills         Prov: RUBA JUNE          2/26/19       Prochlorperazine Maleate (Prochlorperazine Maleate) 10 Mg Tab      10 MG PO Q6H PRN for NAUSEA AND/OR VOMITING for 30 Days, #120 TAB 3 Refills         Prov: DEBBIE TAVAREZ         2/13/19       Ondansetron Hcl (ONDANSETRON HCL) 4 Mg Tablet      8 MG PO Q8H PRN for NAUSEA AND/OR VOMITING for 30 Days, #180 TAB 3 Refills         Prov: DEBBIE TAVAREZ         2/13/19       Aspirin Chew (Aspirin Baby) 81 Mg Tab.chew      81 MG PO QDAY, #30 TAB.CHEW 0 Refills         Reported         2/13/19       Furosemide* (Lasix*) 40 Mg Tablet      40 MG PO QDAY, #30 TAB 0 Refills         Reported         2/13/19       Metoprolol Tartrate (Metoprolol Tartrate) 25 Mg Tablet      25 MG PO BID, #60 TAB 0 Refills         Reported         2/13/19       Prednisolone* (Prednisolone*) 5 Mg Tab      5 MG PO QDAY, TAB         Reported         2/13/19       Pantoprazole (Protonix*) 20 Mg Tablet      40 MG PO BIDAC, #120 TAB 0 Refills         Reported         2/13/19      Medications Reviewed:  Changes made to meds            IMPRESSION/PLAN      Diagnosis      Esophageal cancer         Malignant neoplasm of lower third of esophagus - C15.5         Malignant neoplasm of esophagus location: lower third      Patient is on palliative chemotherapy with Taxotere and Cyramza.  Tolerating     well thus far.  Proceed with cycle as planned.  I will see him back for cycle 5-    day 1 with labs and restaging scans prior.  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  Also suggested vitamin B6 150 mg daily.  Reassess     next visit            Notes      New Medications      * Methotrexate Sodium (Methotrexate) 2.5 MG TAB: 12.5 MG PO Watson      * Pravastatin Sod (Pravastatin*) 40 MG TABLET: 40 MG PO QDAY #30      * DULoxetine (Cymbalta) 20 MG CAPSULE.DR: 20 MG PO QDAY 30 Days #30         Dx: Metastasis from esophageal cancer - C79.9, C15.9       Discontinued Medications      * DULoxetine (Cymbalta) 20 MG CAPSULE.DR: 20 MG PO QDAY 90 Days #90      New Diagnostics      * CT Abd/Pelvis/Chest W/Contrast, 4 Weeks         Dx: Metastasis from esophageal cancer - C79.9, C15.9      * CMP Comp Metabolic Panel, 4 Weeks         Dx: Metastasis from esophageal cancer - C79.9, C15.9      * CBC With Auto Diff, 4 Weeks         Dx: Metastasis from esophageal cancer - C79.9, C15.9            Patient Education      Patient Education Provided:  Yes            Electronically signed by DEBBIE TAVAREZ  11/06/2019 16:30       Disclaimer: Converted document may not contain table formatting or lab diagrams. Please see [employer] for the authenticated document.

## 2021-05-28 NOTE — PROGRESS NOTES
Patient: COLLEEN CONNOLLY     Acct: [id number]     Report: #[id number]  UNIT #: [id number]     : 1950    Encounter Date:2019  PRIMARY CARE: SATISH CASTRO  ***Signed***  --------------------------------------------------------------------------------------------------------------------  NURSE INTAKE      Visit Type      Established Patient Visit            Chief Complaint      ESOPHAGEAL CA      Intent of Therapy:  Palliative            Referring Provider/Copies To      Provider Not Found in Lookup:  DANA RODRIGUEZ      Primary Care Provider:  SATISH CASTRO            History and Present Illness      Past Oncology Illness History      Mr. Connolly is a 68-year-old white male who presents today for evaluation and     discussion of treatment options for his recently diagnosed esophageal cancer.      Patient states that this was initially identified when he was noted to be iron     deficient by his rheumatologist who was doing routine blood work for follow-up     of his methotrexate and rheumatoid arthritis treatment.  He underwent     colonoscopy which was benign as well as EGD at his local hospital that showed a     lesion in the distal esophagus.  He was seen by surgical oncology at [facility], Dr. Do.  Repeat EGD confirmed lesion in the lower     esophagus/GE junction area.  Biopsies showed marked atypia.  Surgery was     considered but patient and family desired a second opinion.  They were then seen    by CT surgery at [facility], Dr. Girard who repeated the     testing and this time biopsy was positive for adenocarcinoma.  He underwent     staging endoscopic ultrasound which identified a lesion in the distal esophagus,    GE junction, cardia of the stomach.  A cardia lymph node was also positive on     FNA giving him N1 disease.  His [employer] physicians have recommended ne    oadjuvant concurrent chemo RT followed by resection based on the trial CROSS      trial.  Patient states that he has had no issues with swallowing, dysphagia,     stomach or chest pain, weight loss, nausea or vomiting.  He continues to take     methotrexate for his rheumatoid arthritis and has considerable difficulties     especially with his hands.  He reports that he has recently completed     intravenous iron therapy.      Upon simulation for esophageal radiation-an area was found on the liver--right     lobe of liver.  MRI liver showed 9 mm lesion in the inferior right hepatic lobe     shows features suspicious for a single hepatic metastasis, particularly given     history of malignancy.       Started on palliative FOLFOX            HPI - Oncology Interim      F/u metastatic esophageal ca--due for C2 FOLFOX today--reports feeling     wonderful.  He experienced a tinge of queasiness Sunday after tx; however, took     anti-emetic and resolved.  Denies diarrhea; had a day of constipation and drank     prune juice to manage.  He experienced a few aches; however, Claritin took care     of.  Reports good appetite and stable wt noted.            Cancer Details            Adenocarcinoma of gastroesophageal junction            Metastatic Sites      Liver            Clinical Staging      Stage IV            Treatments      Chemotherapy      3/6/19 mFOLFOX initiated            Clinical Trial Participant      No            ECOG Performance Status      0            Most Recent Lab Findings      Laboratory Tests      3/6/19 10:02            3/20/19 12:43            Laboratory Tests            Test       3/6/19      10:02             Magnesium Level       1.66 mg/dL      (1.60-2.30)            PAST, FAMILY   Past Medical History      Past Medical History:  Arthritis, Heart Attack, Hypertension      Hematology/Oncology (M):  Oral Cancer (ESOPHAGEAL CA), Skin Cancer            Past Surgical History      Biopsy (X 4), Coronary Stent, Joint Replacement, Skin Cancer Removal            Family History       Family History:  Uterine Cancer (MOTHER)            Social History      Marital Status:        Lives independently:  Yes      Number of Children:  3      Occupation:  RETIRED            Tobacco Use      Tobacco status:  Former smoker            Alcohol Use      Alcohol intake:  None            Substance Use      Substance use:  Denies use            REVIEW OF SYSTEMS      General:  Denies: Appetite Change, Fatigue, Fever, Night Sweats, Weight Gain,     Weight Loss      Eye:  Denies: Blurred Vision, Corrective Lenses, Diplopia, Eye Irritation, Eye     Pain, Eye Redness, Spots in Vision, Vision Loss      ENT:  Denies: Headache, Hearing Loss, Hoarseness, Seizures, Sinus Congestion,     Sore Throat      Cardiovascular:  Denies: Chest Pain, Edema Ankles, Edema Legs, Irregular     Heartbeat, Palpitations      Respiratory:  Denies: Coughing Blood, Productive Cough, Shortness of Air,     Wheezing      Gastrointestinal:  Denies: Bloody Stools, Constipation, Diarrhea, Frequent     Heartburn, Nausea, Problem Swallowing, Tarry Stools, Unable to Control Bowels,     Vomiting      Genitourinary (male):  Denies: Blood in Urine, Decrease Urine Stream, Frequent     Urination, Incontinence, Painful Urination      Musculoskeletal:  Denies: Back Pain, Leg Cramps, Muscle Pain, Muscle Weakness,     Painful Joints, Swollen Joints      Integumentary:  Denies: Bleeds Easily, Bruises Easily, Hair Changes, Jaundice,     Lesions, Mole Changes, Nail Changes, Pigment Changes, Rash, Skin Discoloration      Neurologic:  Denies: Dizziness, Fainting, Numbness\Tingling, Paralysis, Seizures      Psychiatric:  Denies: Anxiety, Confused, Depression, Disoriented, Memory Loss      Endocrine:  Denies: Cold Intolerance, Diabetes, Excessive Sweating, Excessive     Thirst, Excessive Urination, Heat Intolerance, Flushing, Hyperthyroidism,     Hypothyroidism      Hematologic/Lymphatic:  Denies: Bruising, Bleeding, Enlarged Lymph Nodes,     Recurrent  Infections, Transfusions            VITAL SIGNS AND SCORES      Vitals      Height 5 ft 9 in / 175.26 cm      Weight 261 lbs 0.394 oz / 118.4 kg      BSA 2.31 m2      BMI 38.5 kg/m2      Temperature 99.0 F / 37.22 C - Temporal      Pulse 66      Respirations 16      Blood Pressure 150/90 Sitting      Pulse Oximetry 95%, ROOM AIR            Pain Score      Experiencing any pain?:  Yes      Pain Scale Used:  Numerical      Pain Intensity:  1            Fatigue Score      Experiencing any fatigue?:  No      Fatigue (0-10 scale):  0 (none)            EXAM      General Appearance:  Positive for: Alert, Oriented x3, Cooperative;          Negative for: Acute Distress      Eye:  Positive for: Anicteric Sclerae, Moist Conjunctiva      Neck:  Positive for: Supple;          Negative for: JVD, Masses      Respiratory:  Positive for: CTAB, Normal Respiratory Effort      Chest:  Port in Place      Abdomen/Gastro:  Positive for: Normal Active Bowel Sounds, Soft;          Negative for: Distention, Hepatosplenomegaly, Tenderness      Cardiovascular:  Positive for: RRR;          Negative for: Gallop, Murmur, Peripheral Edema, Rub      Psychiatric:  Positive for: Appropriate Affect, Intact Judgement      Other      Synovitis of the metacarpophalangeal joints on both hands      Lower Extremities:  Negative for: Edema      Upper Extremities:  Negative for: Clubbing, Digital Cyanosis, Digital Ischemia      Lymphatic:  Negative for: Axillary, Cervical, Infraclavicular, Supraclavicular            PREVENTION      Hx Influenza Vaccination:  Yes      Date Influenza Vaccine Given:  Dec 1, 2018      2 or More Falls Past Year?:  No      Fall Past Year with Injury?:  No      Encouraged to follow-up with:  PCP regarding preventative exams.      Chart initiated by      SUZANNE FAIRCHILD MA            ALLERGY/MEDS      Allergies      Coded Allergies:             NO KNOWN DRUG ALLERGIES (Verified  Allergy, Unknown, 3/20/19)            Medications       Last Reconciled on 3/20/19 09:26 by YADI JC      Lidocaine/Prilocaine (Emla) 30 Gm Cream..g.      1 APL TOPICAL ONCE for apply to port site w/chemo, #2 GM 6 Refills         Prov: RUBA JUNE         2/26/19       Prochlorperazine Maleate (Prochlorperazine Maleate) 10 Mg Tab      10 MG PO Q6H PRN for NAUSEA AND/OR VOMITING for 30 Days, #120 TAB 3 Refills         Prov: DEBBIE TAVAREZ         2/13/19       Ondansetron Hcl (ONDANSETRON HCL) 4 Mg Tablet      8 MG PO Q8H PRN for NAUSEA AND/OR VOMITING for 30 Days, #180 TAB 3 Refills         Prov: DEBBIE TAVAREZ         2/13/19       Aspirin Chew (Aspirin Baby) 81 Mg Tab.chew      81 MG PO QDAY, #30 TAB.CHEW 0 Refills         Reported         2/13/19       Allopurinol (Zyloprim*) 100 Mg Tablet      100 MG PO BID for 30 Days, #60 TAB 0 Refills         Reported         2/13/19       Furosemide* (Lasix*) 40 Mg Tablet      40 MG PO QDAY, #30 TAB 0 Refills         Reported         2/13/19       Pravastatin Sod (Pravastatin*) 40 Mg Tablet      80 MG PO QDAY, #60 TAB 0 Refills         Reported         2/13/19       amLODIPine (amLODIPine) 2.5 Mg Tablet      5 MG PO QDAY, #60 TAB 0 Refills         Reported         2/13/19       Metoprolol Tartrate (Metoprolol Tartrate*) 25 Mg Tablet      25 MG PO BID, #60 TAB 0 Refills         Reported         2/13/19       Prednisolone* (Prednisolone*) 5 Mg Tab      5 MG PO QDAY, TAB         Reported         2/13/19       Telmisartan (Micardis*) 40 Mg Tablet      80 MG PO QDAY, TAB         Reported         2/13/19       Pantoprazole (Protonix*) 20 Mg Tablet      40 MG PO BIDAC, #120 TAB 0 Refills         Reported         2/13/19      Medications Reviewed:  No Changes made to meds            IMPRESSION/PLAN      Impression      Esophageal cancer, metastatic.            Diagnosis      Metastasis from esophageal cancer - C79.9, C15.9      Patient is on palliative chemotherapy with FOLFOX.  Tolerated cycle 1 quite     well.  Due for  cycle 2 today.  Lab work looks adequate.  Proceed with cycle 2 as    planned.  RTC 2 weeks for OV, cycle 3 with labs prior.  Plan restaging scans     after cycle 4.            Patient Education      Patient Education Provided:  Yes            Topics Patient Counseled on      Restaging scan after 4th Cycle chemo                 Disclaimer: Converted document may not contain table formatting or lab diagrams. Please see [employer] for the authenticated document.

## 2021-05-28 NOTE — PROGRESS NOTES
Patient: COLLEEN CONNOLLY     Acct: [id number]     Report: #[id number]  UNIT #: [id number]     : 1950    Encounter Date:2019  PRIMARY CARE: SATISH CASTRO  ***Signed***  --------------------------------------------------------------------------------------------------------------------  NURSE INTAKE      Visit Type      Established Patient Visit            Chief Complaint      METS ESOPHAGEAL CA      Intent of Therapy:  Palliative            Referring Provider/Copies To      Provider Not Found in Lookup:  DANA RODRIGUEZ      Primary Care Provider:  SATISH CASTRO            History and Present Illness      Past Oncology Illness History      Mr. Connolly is a 68-year-old white male who presents today for evaluation and     discussion of treatment options for his recently diagnosed esophageal cancer.      Patient states that this was initially identified when he was noted to be iron     deficient by his rheumatologist who was doing routine blood work for follow-up     of his methotrexate and rheumatoid arthritis treatment.  He underwent     colonoscopy which was benign as well as EGD at his local hospital that showed a     lesion in the distal esophagus.  He was seen by surgical oncology at [facility], Dr. Do.  Repeat EGD confirmed lesion in the lower     esophagus/GE junction area.  Biopsies showed marked atypia.  Surgery was     considered but patient and family desired a second opinion.  They were then seen    by CT surgery at [facility], Dr. Girard who repeated the     testing and this time biopsy was positive for adenocarcinoma.  He underwent     staging endoscopic ultrasound which identified a lesion in the distal esophagus,    GE junction, cardia of the stomach.  A cardia lymph node was also positive on     FNA giving him N1 disease.  His [employer] physicians have recommended     neoadjuvant concurrent chemo RT followed by resection based on the trial  CROSS     trial.  Patient states that he has had no issues with swallowing, dysphagia,     stomach or chest pain, weight loss, nausea or vomiting.  He continues to take     methotrexate for his rheumatoid arthritis and has considerable difficulties     especially with his hands.  He reports that he has recently completed     intravenous iron therapy.      Upon simulation for esophageal radiation-an area was found on the liver--right     lobe of liver.  MRI liver showed 9 mm lesion in the inferior right hepatic lobe     shows features suspicious for a single hepatic metastasis, particularly given     history of malignancy.       Started on palliative FOLFOX            HPI - Oncology Interim      F/u met esophageal ca--pt reports feeling well.  He denies diarrhea.  He has a     good appetite with stable wt.  Does reports a tinge of discomfort with     swallowing on rt side of throat but not consistent. Denies post nasal drip.       BLE with edema noted.  Overall pain is minimal he reports.  Rheumatology has     increased his Methotrexate for RA.  Denies fever or distress at this time.            Cancer Details            Adenocarcinoma of gastroesophageal junction            Metastatic Sites      Liver            Clinical Staging      Stage IV            Treatments      Chemotherapy      3/6/19-4/17/17 completed 4C FOLFOX  progression  5/22/19-7/3/19 completed 3C     Keytruda   progression  Cyramza/Taxol prescribed            Clinical Trial Participant      No            ECOG Performance Status      0            Most Recent Imaging Findings      7/18/19            PROCEDURE:   CT ABDOMEN; CT CHEST; CT PELVIS WITH CONTRAST             COMPARISON:   [facility], CT, CT CHEST ABD PEL W CONTRAST,     4/29/2019, 13:54.             INDICATIONS:   ESOPHAGEAL CANCER, LIVER CANCER restaging.  Observation for     response to therapy and       metastatic disease.             TECHNIQUE:   After obtaining the  patient's consent, CT images were obtained with    intravenous contrast       material.      PROTOCOL:     Standard imaging protocol performed                RADIATION:     DLP: 2193 mGy*cm          Automated exposure control was utilized to minimize radiation dose.       CONTRAST:   100 cc Isovue 370 I.V.      LABS:     eGFR: >60 ml/min/1.73m2             FINDINGS:         Chest:  There are few tiny 1-2 mm nodules that are unchanged.  No new or     suspicious pulmonary       nodule.  No pathologically enlarged mediastinal lymph nodes.  Somewhat prominent    but otherwise       normal appearing bilateral axillary lymph nodes are not significantly changed.      No visible       esophageal mass.  No aggressive appearing bone change.             Abdomen:  Scattered low-attenuation lesions in the liver.  Some of these lesions    are stable and       others are larger.  A lesion in segment 2 of the liver measures 2.4 cm,     previously 1.6 cm.  A       lesion at the segment 7 8 junction measures 1.4 cm and is unchanged.  1.8 cm     lesion in segment 6 of       the liver is not seen on the previous study.             Spleen, adrenal glands, pancreas are unremarkable.  Uncomplicated     cholelithiasis.  Tiny       nonobstructing calculus at the lower pole the right kidney.  Bowel loops are     nondilated.  The       appendix is normal.             Pelvis:  A few small retroperitoneal and iliac chain lymph nodes are stable.  No    pelvic mass or       fluid.  No aggressive appearing bone change.               CONCLUSION:   Interval progression of hepatic metastatic disease.             No definitive evidence for active metastatic disease in the chest or pelvis.            PAST, FAMILY   Past Medical History      Past Medical History:  Arthritis, Heart Attack, Hypertension      Hematology/Oncology (M):  GI Cancer, Oral Cancer, Skin Cancer            Past Surgical History      Biopsy (LIVER), Coronary Stent, Joint  Replacement, Skin Cancer Removal            Family History      Family History:  Uterine Cancer            Social History      Marital Status:        Lives independently:  Yes      Number of Children:  3      Occupation:  RETIRED            Tobacco Use      Tobacco status:  Former smoker            Alcohol Use      Alcohol intake:  None            Substance Use      Substance use:  Denies use            REVIEW OF SYSTEMS      General:  Admits: Fatigue      Eye:  Admits: Corrective Lenses      ENT:  Admits: Hearing Loss      Cardiovascular:  Admits: Edema Ankles, Edema Legs      Respiratory:  Denies: Coughing Blood      Genitourinary (male):  Denies: Blood in Urine      Musculoskeletal:  Denies: Back Pain      Integumentary:  Denies: Bleeds Easily      Neurologic:  Denies: Dizziness            VITAL SIGNS AND SCORES      Vitals      Height 5 ft 9.00 in / 175.26 cm      Weight 252 lbs 13.882 oz / 114.7 kg      BSA 2.28 m2      BMI 37.3 kg/m2      Temperature 98.2 F / 36.78 C - Temporal      Pulse 67      Respirations 18      Blood Pressure 165/91 Sitting, Left Arm      Pulse Oximetry 95%, RM AIR            Pain Score      Pain Scale Used:  Numerical      Pain Intensity:  0            Fatigue Score      Fatigue (0-10 scale):  3            EXAM      General Appearance:  Positive for: Alert, Oriented x3, Cooperative;          Negative for: Acute Distress      Eye:  Positive for: Anicteric Sclerae, Moist Conjunctiva      Neck:  Positive for: Supple;          Negative for: JVD, Masses      Respiratory:  Positive for: CTAB, Normal Respiratory Effort      Chest:  Port in Place      Abdomen/Gastro:  Positive for: Normal Active Bowel Sounds, Soft;          Negative for: Distention, Hepatosplenomegaly, Tenderness      Cardiovascular:  Positive for: Peripheral Edema (Trace bilateral lower     extremity), RRR;          Negative for: Gallop, Murmur, Rub      Psychiatric:  Positive for: Appropriate Affect, Intact Judgement       Other      Synovitis of the metacarpophalangeal joints and proximal interphalangeal joints     on bilateral hands      Lymphatic:  Negative for: Cervical, Infraclavicular, Supraclavicular            PREVENTION      Hx Influenza Vaccination:  Yes      Date Influenza Vaccine Given:  Dec 1, 2018      2 or More Falls Past Year?:  No      Fall Past Year with Injury?:  No      Encouraged to follow-up with:  PCP regarding preventative exams.      Chart initiated by      DIANA COSME MA            ALLERGY/MEDS      Allergies      Coded Allergies:             NO KNOWN DRUG ALLERGIES (Verified  Allergy, Unknown, 7/31/19)            Medications      Last Reconciled on 7/31/19 09:32 by YADI JC      PACLitaxel (taxOL) 6 Mg/1 Ml Vial      0 IV D 1, 8      Reported         7/31/19       (Cyramza)   No Conflict Check      IV D 1      Reported         7/31/19       Telmisartan (Micardis*) 20 Mg Tab      20 MG PO QDAY, TAB         Reported         7/31/19       Methotrexate Sodium (Methotrexate) 2.5 Mg Tab      15 MG PO Watson, TAB         Reported         7/31/19       Allopurinol (Allopurinol) 300 Mg Tablet      300 MG PO QDAY, #30 TAB 0 Refills         Reported         6/12/19       Ubidecarenone (Co Q-10) 1 Each Capsule      100 MG PO QDAY, CAP         Reported         5/15/19       Folic Acid (Folic Acid*) 1 Mg Tablet      1 MG PO QDAY, #30 TAB 0 Refills         Reported         5/1/19       amLODIPine (amLODIPine) 2.5 Mg Tablet      2.5 MG PO QDAY, #30 TAB 0 Refills         Reported         5/1/19       Lidocaine/Prilocaine (Emla) 30 Gm Cream..g.      1 APL TOPICAL ONCE for apply to port site w/chemo, #2 GM 6 Refills         Prov: RUBA JUNE         2/26/19       Prochlorperazine Maleate (Prochlorperazine Maleate) 10 Mg Tab      10 MG PO Q6H PRN for NAUSEA AND/OR VOMITING for 30 Days, #120 TAB 3 Refills         Prov: DEBBIE TAVAREZ         2/13/19       Ondansetron Hcl (ONDANSETRON HCL) 4 Mg Tablet      8 MG  PO Q8H PRN for NAUSEA AND/OR VOMITING for 30 Days, #180 TAB 3 Refills         Prov: DEBBIE TAVAREZ         2/13/19       Aspirin Chew (Aspirin Baby) 81 Mg Tab.chew      81 MG PO QDAY, #30 TAB.CHEW 0 Refills         Reported         2/13/19       Furosemide* (Lasix*) 40 Mg Tablet      40 MG PO QDAY, #30 TAB 0 Refills         Reported         2/13/19       Pravastatin Sod (Pravastatin*) 40 Mg Tablet      80 MG PO QDAY, #60 TAB 0 Refills         Reported         2/13/19       Metoprolol Tartrate (Metoprolol Tartrate) 25 Mg Tablet      25 MG PO BID, #60 TAB 0 Refills         Reported         2/13/19       Prednisolone* (Prednisolone*) 5 Mg Tab      5 MG PO QDAY, TAB         Reported         2/13/19       Pantoprazole (Protonix*) 20 Mg Tablet      40 MG PO BIDAC, #120 TAB 0 Refills         Reported         2/13/19      Medications Reviewed:  Changes made to meds            IMPRESSION/PLAN      Diagnosis      Metastasis from esophageal cancer - C79.9, C15.9      Metastatic.  Recent scan unfortunate demonstrates progression of disease with a     new 1.8 cm lesion in the liver along with his prior lesions enlarging.      Clinically he still has a good performance status, ECOG PS 0.  We discussed     standard therapy which could be given locally with Taxol 80 mg/m ² on days 1, 8,     15 every 28-day cycle along with Cyramza 8 mg/kg on days 1, 15.  Side effects,     risk and benefits discussed in detail with patient and wife.  We also discussed     clinical trials but he is not interested in travel at this time.  I will ask our     clinical trial nurse to look into options for the future.  He is agreeable to     beginning the Taxol and Cyramza.  He will have lab work today including     urinalysis to ensure adequate endorgan function, blood counts and lack of protei    ml.  I will plan his initial cycle in the near future.  I will plan to see     him back for cycle 2, day 1 with labs prior      New Diagnostics      * CMP  Comp Metabolic Panel, Routine      * Urinalyis W/Micro, Routine      * CBC With Auto Diff, Routine            Rheumatoid arthritis         Rheumatoid arthritis involving multiple sites with positive rheumatoid factor        - M05.79         Rheumatoid arthritis location: multiple sites         Rheumatoid factor presence: with rheumatoid factor      Patient is on low-dose weekly methotrexate along with folic acid daily.  Given     the small dose, I do not think he needs to adjust during his chemotherapy but he     will be monitored for side effects            Notes      New Medications      * Methotrexate Sodium (Methotrexate) 2.5 MG TAB: 15 MG PO Watson      * Telmisartan (Micardis*) 20 MG TAB: 20 MG PO QDAY      * (Cyramza): IV D 1   * PACLitaxel (taxOL) 6 MG/1 ML VIAL: IV D 1, 8         Patient Education            Paclitaxel Injection      Ramucirumab Injection      Patient Education Provided:  Yes                 Disclaimer: Converted document may not contain table formatting or lab diagrams. Please see [employer] for the authenticated document.

## 2021-05-28 NOTE — PROGRESS NOTES
Patient: COLLEEN CONNOLLY     Acct: [id number]     Report: #[id number]  UNIT #: [id number]     : 1950    Encounter Date:2021  PRIMARY CARE: SATISH CASTRO  ***Signed***  --------------------------------------------------------------------------------------------------------------------  NURSE INTAKE      Visit Type      Established Patient Visit            Chief Complaint      esophageal ca f/u      Intent of Therapy:  Palliative            Referring Provider/Copies To      Primary Care Provider:  SATISH CASTRO      Copies To:   SATISH CASTRO            History and Present Illness      Past Oncology Illness History      Mr. Connolly is a 70-year-old white male who presents with esophageal cancer.  Addy rain states that this was initially identified when he was noted to be iron     deficient by his rheumatologist who was doing routine blood work for follow-up     of his methotrexate and rheumatoid arthritis treatment.  He was seen by surgical    oncology at [facility], Dr. Do.  Repeat EGD confirmed lesion     in the lower esophagus/GE junction area.  Biopsies showed marked atypia.      Surgery was considered but patient and family desired a second opinion.  They     were then seen by CT surgery at [facility], Dr. Girard who     repeated the testing and this time biopsy was positive for adenocarcinoma.  He     underwent staging endoscopic ultrasound which identified a lesion in the distal     esophagus, GE junction, cardia of the stomach.  A cardia lymph node was also     positive on FNA giving him N1 disease.  His [facility] physicians have     recommended neoadjuvant concurrent chemo RT followed by resection based on the     trial CROSS trial.  Upon simulation for esophageal radiation-an area was found     on the liver--right lobe of liver.  MRI liver showed 9 mm lesion in the inferior    right hepatic lobe shows features suspicious for a single hepatic  metastasis,     particularly given history of malignancy.4/22/20 Cycle 10 day 15 held due to     increased productive cough. 10 days of antibiotics prescribed.  8/26/20     progression stopped Cyramza/Taxol started Irinotecan. 9/30/2020 patient     experience a significant amount of diarrhea after second treatment which     required hospitalization. Irinotecan stopped and Lonsurf initiated. 12/9/20 CT     scans revealed progression. Referred to  for clinical trial options. 5/11/21     patient presents to the clinic to discuss treatment options for his esophpageal     cancer. 3/2021 Y-90 installations by Dr. Goyal at .            HPI - Oncology Interim      Patient returns for follow-up of his esophageal cancer.  Since his last [facility]     [facility]  He has undergone yttrium treatments to     his liver x2.  He states that he tolerated the treatments okay.  He did have     significant fatigue but his energy is slowly improving.  He is eating and     drinking well.  He recently had to have his esophageal stent replaced due to     migration into the stomach.  He denies unusual aches or pains.  He denies any ma    sses or adenopathy.  No issues from Port-A-Cath.  I reviewed [facility] and spoke to Dr. Goyal with radiation oncology.  He     recommends repeat imaging in the beginning of June to assess response to the     atrium therapy.  He continues to perform all his ADLs.            Cancer Details            Adenocarcinoma of gastroesophageal junction            Clinical Staging      Stage IV            Treatments      Chemotherapy      3/6/19-4/17/17 completed 4C FOLFOX  progression  5/22/19-7/3/19 completed 3C     Keytruda   progression  Cyramza/Taxol initiated 8/7/19 8/26/20 progression     stopped Cyramza/Taxol started Irinotecan. 9/30/20 Irinitecan stopped and Lonsurf    initiated.      Radiation Therapy      3/2021 Y-90 installations by   Jay Jay at       Surgeries      4/2021 Esophageal stent placed            Clinical Trial Participant      No            ECOG Performance Status      0            PAST, FAMILY   Past Medical History      Past Medical History:  Arthritis, Heart Attack, Hypertension      Hematology/Oncology (M):  GI Cancer, Oral Cancer, Skin Cancer            Past Surgical History      Biopsy, Coronary Stent, Joint Replacement, Skin Cancer Removal            stent in esophagus            Family History      Family History:  Uterine Cancer            Social History      Marital Status:        Lives independently:  Yes      Number of Children:  3      Occupation:  RETIRED            Tobacco Use      Tobacco status:  Former smoker      Currently Vaping:  No            Alcohol Use      Alcohol intake:  None            Substance Use      Substance use:  Denies use            REVIEW OF SYSTEMS      General:  Admits: Fatigue, Weight Loss      Eye:  Admits Corrective Lenses      ENT:  Admits Hearing Loss      Cardiovascular:  Denies Chest Pain      Respiratory:  Denies: Shortness of Air      Gastrointestinal:  Admits Nausea/Vomiting      Musculoskeletal:  Admits Painful Joints      Neurologic:  Denies Dizziness, Denies Numbness\Tingling            VITAL SIGNS AND SCORES      Vitals      Weight 227 lbs 1.181 oz / 103 kg      Temperature 95.1 F / 35.06 C - Temporal      Pulse 68      Respirations 16      Blood Pressure 141/87 Sitting, Left Arm      Pulse Oximetry 96%, rm air            Pain Score      Pain Intensity:  0            Fatigue Score      Fatigue (0-10 scale):  4            EXAM      General Appearance:  Positive for: Alert, Cooperative;          Negative for: Acute Distress      Eye:  Positive for: Anicteric Sclerae, Moist Conjunctiva      Neck:  Positive for: Supple;          Negative for: JVD, Masses      Respiratory:  Positive for: CTAB, Normal Respiratory Effort      Chest:  Port in Place      Abdomen/Gastro:  Positive  for: Normal Active Bowel Sounds, Soft;          Negative for: Hepatosplenomegaly, Tenderness      Cardiovascular:  Positive for: RRR;          Negative for: Gallop, Murmur, Peripheral Edema, Rub      Psychiatric:  Positive for: Appropriate Affect, Intact Judgement      Lymphatic:  Negative for: Cervical, Infraclavicular, Supraclavicular            PREVENTION      Hx Influenza Vaccination:  Yes      Date Influenza Vaccine Given:  Nov 1, 2020      2 or More Falls in Past Year?:  No      Fall Past Year with Injury?:  No      Hx Pneumococcal Vaccination:  Yes      Encouraged to follow-up with:  PCP regarding preventative exams.      Chart initiated by      abhijit talley ma            ALLERGY/MEDS      Allergies      Coded Allergies:             NO KNOWN DRUG ALLERGIES (Verified  Allergy, Unknown, 5/11/21)            Medications      Last Reconciled on 5/12/21 08:06 by DEBBIE TAVAREZ      Ondansetron Hcl (ONDANSETRON HCL) 8 Mg Tablet      8 MG PO Q8H PRN for NAUSEA, #30 TAB 3 Refills         Prov: DEBBIE TAVAREZ         5/11/21       Pravastatin Sodium (Pravastatin Sodium) 80 Mg Tablet      80 MG PO HS for 30 Days, #30 TAB         Reported         4/13/21       Magnesium Oxide (Magnesium Oxide*) 400 Mg Tablet      400 MG PO BID, #60 TAB 0 Refills         Reported         1/13/21       Levothyroxine (Synthroid) 0.025 Mg      0.0125 MG PO QDAY@07, #15 TAB 0 Refills         Reported         8/12/20       Irbesartan (Irbesartan) 300 Mg Tablet      300 MG PO QDAY for 30 Days, #30 TAB         Reported         1/29/20       Methotrexate Sodium (Methotrexate) 2.5 Mg Tab      12.5 MG PO Watson, TAB         Reported         11/6/19       Allopurinol (Allopurinol) 300 Mg Tablet      300 MG PO QDAY, #30 TAB 0 Refills         Reported         6/12/19       Ubidecarenone (Co Q-10) 1 Each Capsule      100 MG PO QDAY, CAP         Reported         5/15/19       Folic Acid (Folic Acid) 1 Mg Tablet      1 MG PO QDAY, #30 TAB 0 Refills          Reported         5/1/19       Aspirin Chew (Aspirin Baby) 81 Mg Tab.chew      81 MG PO QDAY, #30 TAB.CHEW 0 Refills         Reported         2/13/19       Furosemide (Lasix) 40 Mg Tablet      60 MG PO QDAY, #30 TAB 0 Refills         Reported         2/13/19       Metoprolol Tartrate (Metoprolol Tartrate) 25 Mg Tablet      25 MG PO BID, #60 TAB 0 Refills         Reported         2/13/19       prednisoLONE (prednisoLONE) 5 Mg Tab      5 MG PO QDAY, TAB         Reported         2/13/19       Pantoprazole (Protonix) 20 Mg Tablet      40 MG PO BIDAC, #120 TAB 0 Refills         Reported         2/13/19      Medications Reviewed:  Changes made to meds            IMPRESSION/PLAN      Diagnosis      Malignant neoplasm of lower third of esophagus - C15.5      Status post multiple lines of systemic therapy.  He recently completed yttrium     treatments to the liver.  He has known persistent disease in the esophagus as     well as a pulmonary nodule that is likely metastatic.  He has seen all of the     active systemic agents.  He does not have any mutations on "[employer]" ration     sequencing.  I discussed this case with Dr. Espinal, radiation oncology here at     [facility].  We could consider radiation to the esophagus and the pulmonary nodule     if his liver has been well controlled with the use of atrium.  I discussed with     him the possibility of systemic Taxotere which he has not seen although he has     progressed on the cousin drug Taxol.  I refilled Zofran as needed for nausea.      He will have a port flush today and routinely while not in active use.  After     discussion, he would like to wait until after his scans in June to determine his    response to atrium therapy before making further plans.  This is reasonable.  I     will see him back in June with scans and labs prior            Notes      Renewed Medications      * ONDANSETRON HCL 8 MG TABLET: 8 MG PO Q8H PRN NAUSEA #30      Discontinued Medications      *  POTASSIUM CHLORIDE (K-Tab*) 10 MEQ TABLET.ER: 20 MEQ PO QDAY low potassium 30       Days #60      New Diagnostics      * CBC With Auto Diff, 3 Week         Dx: Metastasis from esophageal cancer - C79.9, C15.9      * CMP Comp Metabolic Panel, 3 Week         Dx: Metastasis from esophageal cancer - C79.9, C15.9      * CT Abd/Pelvis/Chest W/Contrast, 3 Week         Dx: Metastasis from esophageal cancer - C79.9, C15.9      New Office Procedures      * Port Flush, 05/11/21         Dx: Metastasis from esophageal cancer - C79.9, C15.9            Pain      Pain Zero Today            Advanced Care Plan Discussion      Declines Discussion [id number]            Patient Education      Patient Education Provided:  Yes            Electronically signed by DEBBIE TAVAREZ  05/12/2021 08:06       Disclaimer: Converted document may not contain table formatting or lab diagrams. Please see [employer] for the authenticated document.

## 2021-05-28 NOTE — PROGRESS NOTES
Patient: COLLEEN CONNOLLY     Acct: [id number]     Report: #[id number]  UNIT #: [id number]     : 1950    Encounter Date:2020  PRIMARY CARE: SATISH CASTRO  ***Signed***  --------------------------------------------------------------------------------------------------------------------  NURSE INTAKE      Visit Type      Established Patient Visit            Chief Complaint      ESOPHAGEAL CA      Intent of Therapy:  Palliative            Referring Provider/Copies To      Primary Care Provider:  SATISH CASTRO      Copies To:   SATISH CASTRO            History and Present Illness      Past Oncology Illness History      Mr. Connolly is a 69-year-old white male who presents with esophageal cancer.      Patient states that this was initially identified when he was noted to be iron     deficient by his rheumatologist who was doing routine blood work for follow-up     of his methotrexate and rheumatoid arthritis treatment.  He was seen by surgical    oncology at [facility], Dr. Do.  Repeat EGD confirmed lesion     in the lower esophagus/GE junction area.  Biopsies showed marked atypia.      Surgery was considered but patient and family desired a second opinion.  They     were then seen by CT surgery at [facility], Dr. Girard who     repeated the testing and this time biopsy was positive for adenocarcinoma.  He     underwent staging endoscopic ultrasound which identified a lesion in the distal     esophagus, GE junction, cardia of the stomach.  A cardia lymph node was also     positive on FNA giving him N1 disease.  His [place] physicians have     recommended neoadjuvant concurrent chemo RT followed by resection based on the     trial CROSS trial.  Upon simulation for esophageal radiation-an area was found     on the liver--right lobe of liver.  MRI liver showed 9 mm lesion in the inferior    right hepatic lobe shows features suspicious for a single hepatic metastasis,      particularly given history of malignancy.4/22/20 Cycle 10 day 15 held due to     increased productive cough. 10 days of antibiotics prescribed.  8/26/20     progression stopped Cyramza/Taxol started Irinotecan. 9/30/2020 patient     experience a significant amount of diarrhea after second treatment which     required hospitalization. Irinotecan stopped and Lonsurf initiated.            HPI - Oncology Interim      Patient returns for follow-up and ongoing treatment of his metastatic esophageal    cancer.  He is on fourth line Lonsurf.  He states he is tolerating the     medication well.  In fact, he states he feels better than he has for several     months.  He is eating and drinking well, his weight is maintained.  He denies     dysphagia.  He denies diarrhea.  He has no new masses lymphadenopathy.  No     issues from Port-A-Cath.  He notes good energy            Cancer Details            Adenocarcinoma of gastroesophageal junction            Clinical Staging      Stage IV            Treatments      Chemotherapy      3/6/19-4/17/17 completed 4C FOLFOX  progression  5/22/19-7/3/19 completed 3C     Keytruda   progression  Cyramza/Taxol initiated 8/7/19 8/26/20 progression     stopped Cyramza/Taxol started Irinotecan. 9/30/20 Irinitecan stopped and Lonsurf    initiated.            Clinical Trial Participant      No            ECOG Performance Status      0            PAST, FAMILY   Past Medical History      Past Medical History:  Arthritis, Heart Attack, Hypertension      Hematology/Oncology (M):  GI Cancer, Oral Cancer, Skin Cancer            Past Surgical History      Biopsy, Coronary Stent, Joint Replacement, Skin Cancer Removal            Family History      Family History:  Uterine Cancer            Social History      Lives independently:  Yes      Number of Children:  3      Occupation:  RETIRED            Tobacco Use      Tobacco status:  Former smoker            Substance Use      Substance use:  Denies use             REVIEW OF SYSTEMS      General:  Admits: Weight Gain      Eye:  Admits Corrective Lenses      ENT:  Admits Hearing Loss      Cardiovascular:  Denies Chest Pain      Gastrointestinal:  Denies Constipation, Denies Diarrhea      Musculoskeletal:  Denies Muscle Pain; Admits Painful Joints      Neurologic:  Denies Seizures      Hematologic/Lymphatic:  Denies Enlarged Lymph Nodes            VITAL SIGNS AND SCORES      Vitals      Weight 257 lbs 15.011 oz / 117 kg      Temperature 97 F / 36.11 C - Temporal      Pulse 66      Respirations 18      Blood Pressure 128/71 Sitting, Left Arm      Pulse Oximetry 98%, RM AIR            Pain Score      Pain Scale Used:  Numerical      Pain Intensity:  0            Fatigue Score      Fatigue (0-10 scale):  0 (none)            EXAM      General Appearance:  Positive for: Alert, Cooperative;          Negative for: Acute Distress      Neck:  Positive for: Supple;          Negative for: JVD, Masses      Respiratory:  Positive for: CTAB, Normal Respiratory Effort      Chest:  Port in Place      Abdomen/Gastro:  Positive for: Normal Active Bowel Sounds, Soft;          Negative for: Hepatosplenomegaly, Tenderness      Cardiovascular:  Positive for: Peripheral Edema, RRR;          Negative for: Gallop, Murmur, Rub      Psychiatric:  Positive for: Appropriate Affect, Intact Judgement            PREVENTION      Hx Influenza Vaccination:  Yes      Date Influenza Vaccine Given:  Nov 1, 2020      2 or More Falls in Past Year?:  No      Fall Past Year with Injury?:  No      Encouraged to follow-up with:  PCP regarding preventative exams.      Chart initiated by      DIANA COSME MA            ALLERGY/MEDS      Allergies      Coded Allergies:             NO KNOWN DRUG ALLERGIES (Verified  Allergy, Unknown, 11/3/20)            Medications      Last Reconciled on 11/3/20 18:28 by DEBBIE TAVAREZ      Trifluridine/Tipiracil HCl (Lonsurf 20 mg-8.19 mg Tablet) 1 Each Tablet      4 TAB PO  BID for 10 Days, #80 TAB 5 Refills         Prov: CORBYDEBBIE         9/30/20       Diphenoxylate/Atropine (Diphenoxylate/Atropine) 1 Each Tablet      2.5 MG PO QID for diarrhea, #60 TAB 0 Refills         Prov: CORBYDEBBIE         9/17/20       Magnesium Oxide (Magnesium Oxide*) 400 Mg Tablet      400 MG PO QDAY for 30 Days, #30 TAB 4 Refills         Prov: DEBBIE TAVAREZ         9/16/20       Prochlorperazine Maleate (Prochlorperazine Maleate) 10 Mg Tab      10 MG PO Q6H PRN for NAUSEA AND/OR VOMITING for 30 Days, #120 TAB 3 Refills         Prov: DEBBIE TAVAREZ         9/10/20       Potassium Chloride (K-Tab*) 10 Meq Tablet.er      20 MEQ PO QDAY for low potassium for 30 Days, #60 TAB 3 Refills         Prov: DEBBIE TAVAREZ         8/12/20       Levothyroxine (Synthroid) 0.025 Mg      0.0125 MG PO QDAY@07, #15 TAB 0 Refills         Reported         8/12/20       Mouthwash (Magic Mouthwash) 1 Each Bottle      15 ML PO Q4H PRN for MOUTH DISCOMFORT, #300 ML 1 Refill         Prov: DEBBIE TAVAREZ         5/20/20       Irbesartan (Irbesartan) 300 Mg Tablet      300 MG PO QDAY for 30 Days, #30 TAB         Reported         1/29/20       Pyridoxine (Vitamin B6) 100 Mg Tablet      100 MG PO BID, TAB         Reported         11/13/19       Methotrexate Sodium (Methotrexate) 2.5 Mg Tab      12.5 MG PO Watson, TAB         Reported         11/6/19       (herbs)   No Conflict Check               Reported         10/2/19       Chlorthalidone (CHLORTHALIDONE) 25 Mg Tablet      12.5 MG PO QDAY for 30 Days, #15 TAB         Reported         10/2/19       Allopurinol (Allopurinol) 300 Mg Tablet      300 MG PO QDAY, #30 TAB 0 Refills         Reported         6/12/19       Ubidecarenone (Co Q-10) 1 Each Capsule      100 MG PO QDAY, CAP         Reported         5/15/19       Folic Acid (Folic Acid) 1 Mg Tablet      1 MG PO QDAY, #30 TAB 0 Refills         Reported         5/1/19       Lidocaine/Prilocaine (Emla) 30 Gm Cream..g.      1 APL TOPICAL  ONCE for apply to port site w/chemo, #2 GM 6 Refills         Prov: RUBA JUNE         2/26/19       Ondansetron Hcl (ONDANSETRON HCL) 4 Mg Tablet      8 MG PO Q8H PRN for NAUSEA AND/OR VOMITING for 30 Days, #180 TAB 3 Refills         Prov: DEBBIE TAVAREZ         2/13/19       Aspirin Chew (Aspirin Baby) 81 Mg Tab.chew      81 MG PO QDAY, #30 TAB.CHEW 0 Refills         Reported         2/13/19       Furosemide* (Lasix*) 40 Mg Tablet      60 MG PO QDAY, #30 TAB 0 Refills         Reported         2/13/19       Metoprolol Tartrate (Metoprolol Tartrate) 25 Mg Tablet      25 MG PO BID, #60 TAB 0 Refills         Reported         2/13/19       prednisoLONE (prednisoLONE) 5 Mg Tab      5 MG PO QDAY, TAB         Reported         2/13/19       Pantoprazole (Protonix) 20 Mg Tablet      40 MG PO BIDAC, #120 TAB 0 Refills         Reported         2/13/19      Medications Reviewed:  No Changes made to meds            IMPRESSION/PLAN      Diagnosis      Malignant neoplasm of lower third of esophagus - C15.5            Notes      Patient returns for ongoing treatment of his metastatic esophageal cancer.  He     has recently completed his first cycle of oral Lonsurf.  Tolerating very well.      Lab work looks okay.  His performance status remains good, ECOG PS 0.  Proceed     with cycle 2 as scheduled.  I will see him back for cycle 3, day 1 with labs and     restaging scans prior      New Diagnostics      * CCC CBC With Auto Diff, 11/03/20         Dx: Metastasis from esophageal cancer - C79.9, C15.9      * CCC Comp Metabolic Panel, 11/03/20         Dx: Metastasis from esophageal cancer - C79.9, C15.9      * CCC CBC With Auto Diff, 5 Week         Dx: Metastasis from esophageal cancer - C79.9, C15.9      * CCC Comp Metabolic Panel, 5 Week         Dx: Metastasis from esophageal cancer - C79.9, C15.9      * CCC Magnesium Serum, 5 Week         Dx: Metastasis from esophageal cancer - C79.9, C15.9      * CCC Magnesium Serum, 11/03/20          Dx: Metastasis from esophageal cancer - C79.9, C15.9      * CT Abd/Pelvis/Chest W/Contrast, 12/04/20         Dx: Metastasis from esophageal cancer - C79.9, C15.9            Patient Education      Patient Education Provided:  Yes            Electronically signed by DEBBIE TAVAREZ  11/03/2020 18:28       Disclaimer: Converted document may not contain table formatting or lab diagrams. Please see [email or web address] for the authenticated document.

## 2021-05-28 NOTE — PROGRESS NOTES
Patient: COLLEEN CONNOLLY     Acct: [id number]     Report: #[id number]  UNIT #: [id number]     : 1950    Encounter Date:2020  PRIMARY CARE: SATISH CASTRO  ***Signed***  --------------------------------------------------------------------------------------------------------------------  NURSE INTAKE      Visit Type      Established Patient Visit            Chief Complaint      ESOPHAGEAL CANCER TREATMENT            Referring Provider/Copies To      Primary Care Provider:  SATISH CASTRO      Copies To:   SATISH CASTRO            History and Present Illness      Past Oncology Illness History      Mr. Connolly is a 69-year-old white male who presents for treatment options for     his esophageal cancer.  Patient states that this was initially identified when     he was noted to be iron deficient by his rheumatologist who was doing routine     blood work for follow-up of his methotrexate and rheumatoid arthritis treatment.     He underwent colonoscopy which was benign as well as EGD at his local hospital     that showed a lesion in the distal esophagus.  He was seen by surgical oncology     at [facility], Dr. Do.  Repeat EGD confirmed lesion in the     lower esophagus/GE junction area.  Biopsies showed marked atypia.  Surgery was     considered but patient and family desired a second opinion.  They were then seen    by CT surgery at [facility], Dr. Girard who repeated the     testing and this time biopsy was positive for adenocarcinoma.  He underwent     staging endoscopic ultrasound which identified a lesion in the distal esophagus,    GE junction, cardia of the stomach.  A cardia lymph node was also positive on     FNA giving him N1 disease.  His [facility] physicians have recommended     neoadjuvant concurrent chemo RT followed by resection based on the trial CROSS     trial.  Patient states that he has had no issues with swallowing, dysphagia,     stomach or chest  pain, weight loss, nausea or vomiting.  He continues to take     methotrexate for his rheumatoid arthritis and has considerable difficulties     especially with his hands.  He reports that he has recently completed     intravenous iron therapy.      Upon simulation for esophageal radiation-an area was found on the liver--right     lobe of liver.  MRI liver showed 9 mm lesion in the inferior right hepatic lobe     shows features suspicious for a single hepatic metastasis, particularly given     history of malignancy.       Started on palliative FOLFOX            HPI - Oncology Interim      Patient returns today for ongoing treatment of his metastatic GE junction     adenocarcinoma.  He is on Taxol plus Cyramza.  He is due for his next cycle.  He    states he is tolerating the chemotherapy well.  He denies heartburn, nausea or     vomiting or diarrhea.  He states he does feel bloated occasionally but usually     resolves on its own.  He is eating and drinking normally, his weight is     maintained.  He reports good energy level.  He denies any issues from his Por    t-A-Cath.  He denies any masses or lymphadenopathy.  He continues to have scant     nosebleed especially in the morning time.  He has been using nasal saline and     Vaseline at bedtime.  He has been happening most mornings.  He denies any nasal     fullness, cough, sinus congestion or ear pain.            Cancer Details            Adenocarcinoma of gastroesophageal junction            Metastatic Sites      Liver            Clinical Staging      Stage IV            Treatments      Chemotherapy      3/6/19-4/17/17 completed 4C FOLFOX  progression  5/22/19-7/3/19 completed 3C     Keytruda   progression  Cyramza/Taxol initiated 8/7/19            Clinical Trial Participant      No            ECOG Performance Status      0            Most Recent Lab Findings            Item Value  Date Time             White Blood Count 4.96 10*3/uL 3/18/20 0933             Red  Blood Count 3.08 10*6/uL L 3/18/20 0933             Hemoglobin 10.3 g/dL L 3/18/20 0933             Hematocrit 33.2 % L 3/18/20 0933             Mean Corpuscular Volume 107.8 fL H 3/18/20 0933             Mean Corpuscular Hemoglobin 33.4 pg H 3/18/20 0933             Mean Corpuscular Hemoglobin Concent 31.0 L 3/18/20 0933             Red Cell Distribution Width 21.4 % H 3/18/20 0933             RDW Standard Deviation 85.8 fL 3/18/20 0933             Platelet Count 250 10*3/uL 3/18/20 0933             Mean Platelet Volume 10.8 fL H 3/18/20 0933            Laboratory Tests      3/4/20 10:33            PAST, FAMILY   Past Medical History      Past Medical History:  Arthritis, Heart Attack, Hypertension      Hematology/Oncology (M):  GI Cancer, Oral Cancer, Skin Cancer            Past Surgical History      Biopsy (LIVER), Coronary Stent, Joint Replacement, Skin Cancer Removal            Family History      Family History:  Uterine Cancer            Social History      Marital Status:        Lives independently:  Yes      Number of Children:  3      Occupation:  RETIRED            Tobacco Use      Tobacco status:  Former smoker            Alcohol Use      Alcohol intake:  None            Substance Use      Substance use:  Denies use            REVIEW OF SYSTEMS      General:  Denies: Appetite Change, Weight Gain      Cardiovascular:  Denies Chest Pain      Respiratory:  Denies: Coughing Blood, Shortness of Air, Wheezing      Musculoskeletal:  Denies Back Pain, Denies Painful Joints      Integumentary:  Denies Rash      Neurologic:  Denies Dizziness            VITAL SIGNS AND SCORES      Vitals      Weight 263 lbs 3.668 oz / 119.4 kg      Temperature 98.5 F / 36.94 C - Temporal      Pulse 74      Respirations 20      Blood Pressure 143/84 Sitting      Pulse Oximetry 96%, ROOM AIR            Pain Score      Experiencing any pain?:  No      Pain Scale Used:  Numerical      Pain Intensity:  0            Fatigue Score       Experiencing any fatigue?:  No      Fatigue (0-10 scale):  0 (none)            EXAM      General Appearance:  Positive for: Alert, Cooperative;          Negative for: Acute Distress      Eye:  Positive for: Anicteric Sclerae, Moist Conjunctiva      Neck:  Positive for: Supple;          Negative for: JVD, Masses      Respiratory:  Positive for: CTAB, Normal Respiratory Effort      Chest:  Port in Place      Abdomen/Gastro:  Positive for: Normal Active Bowel Sounds, Soft;          Negative for: Distention, Hepatosplenomegaly, Tenderness      Cardiovascular:  Positive for: RRR;          Negative for: Gallop, Murmur, Peripheral Edema, Rub      Psychiatric:  Positive for: Appropriate Affect, Intact Judgement      Lymphatic:  Negative for: Cervical, Infraclavicular, Supraclavicular            PREVENTION      Hx Influenza Vaccination:  Yes      Date Influenza Vaccine Given:  Nov 1, 2019      2 or More Falls Past Year?:  No      Fall Past Year with Injury?:  No      Encouraged to follow-up with:  PCP regarding preventative exams.      Chart initiated by      ZORAIDA ROSENTHAL [facility]            ALLERGY/MEDS      Allergies      Coded Allergies:             NO KNOWN DRUG ALLERGIES (Verified  Allergy, Unknown, 3/18/20)            Medications      Last Reconciled on 3/18/20 15:36 by DEBBIE TAVAREZ      Irbesartan (Irbesartan) 300 Mg Tablet      300 MG PO QDAY for 30 Days, #30 TAB         Reported         2/5/20       Irbesartan (Irbesartan) 300 Mg Tablet      300 MG PO QDAY for 30 Days, #30 TAB         Reported         1/29/20       Potassium Chloride (K-Tab*) 10 Meq Tablet.er      20 MEQ PO QDAY for low potassium for 30 Days, #60 TAB 3 Refills         Prov: DEBBIE TAVAREZ         1/22/20       Pyridoxine (Vitamin B6) 100 Mg Tablet      100 MG PO BID, TAB         Reported         11/13/19       DULoxetine (Cymbalta) 20 Mg Capsule.dr      20 MG PO QDAY for 30 Days, #30 CAP 5 Refills         Prov: DEBBIE TAVAREZ         11/6/19        Methotrexate Sodium (Methotrexate) 2.5 Mg Tab      12.5 MG PO Watson, TAB         Reported         11/6/19       (herbs)   No Conflict Check               Reported         10/2/19       Chlorthalidone (CHLORTHALIDONE) 25 Mg Tablet      12.5 MG PO QDAY for 30 Days, #15 TAB         Reported         10/2/19       PACLitaxel (taxOL) 6 Mg/1 Ml Vial      0 IV D 1, 8      Reported         7/31/19       (Cyramza)   No Conflict Check      IV D 1      Reported         7/31/19       Telmisartan (Micardis) 20 Mg Tab      20 MG PO QDAY, TAB         Reported         7/31/19       Allopurinol (Allopurinol) 300 Mg Tablet      300 MG PO QDAY, #30 TAB 0 Refills         Reported         6/12/19       Ubidecarenone (Co Q-10) 1 Each Capsule      100 MG PO QDAY, CAP         Reported         5/15/19       Folic Acid (Folic Acid) 1 Mg Tablet      1 MG PO QDAY, #30 TAB 0 Refills         Reported         5/1/19       Lidocaine/Prilocaine (Emla) 30 Gm Cream..g.      1 APL TOPICAL ONCE for apply to port site w/chemo, #2 GM 6 Refills         Prov: RUBA JUNE         2/26/19       Prochlorperazine Maleate (Prochlorperazine Maleate) 10 Mg Tab      10 MG PO Q6H PRN for NAUSEA AND/OR VOMITING for 30 Days, #120 TAB 3 Refills         Prov: DEBBIE TAVAREZ         2/13/19       Ondansetron Hcl (ONDANSETRON HCL) 4 Mg Tablet      8 MG PO Q8H PRN for NAUSEA AND/OR VOMITING for 30 Days, #180 TAB 3 Refills         Prov: DEBBIE TAVAREZ         2/13/19       Aspirin Chew (Aspirin Baby) 81 Mg Tab.chew      81 MG PO QDAY, #30 TAB.CHEW 0 Refills         Reported         2/13/19       Furosemide* (Lasix*) 40 Mg Tablet      40 MG PO QDAY, #30 TAB 0 Refills         Reported         2/13/19       Metoprolol Tartrate (Metoprolol Tartrate) 25 Mg Tablet      25 MG PO BID, #60 TAB 0 Refills         Reported         2/13/19       prednisoLONE (prednisoLONE) 5 Mg Tab      5 MG PO QDAY, TAB         Reported         2/13/19       Pantoprazole (Protonix) 20 Mg Tablet      40  MG PO BIDAC, #120 TAB 0 Refills         Reported         2/13/19      Medications Reviewed:  No Changes made to meds            IMPRESSION/PLAN      Diagnosis      Nosebleed - R04.0      Patient has scant persistent nosebleed despite conservative measures with nasal     saline, humidifying the air and Vaseline.  I will refer him to ENT for further     evaluation.            Malignant neoplasm of lower third of esophagus - C15.5      Patient is on palliative treatment with Taxol plus Cyramza.  Lab work is     adequate for treatment.  Proceed with cycle as planned.  I will see him back for     day 1 of his next cycle with labs prior.            Notes      New Referrals      * Ear, Nose, Throat, As Soon As Possible         Clement Cueto         Dx: Bleeding nose - R04.0            Patient Education      Patient Education Provided:  Yes            Electronically signed by DEBBIE TAVAREZ  03/18/2020 15:36       Disclaimer: Converted document may not contain table formatting or lab diagrams. Please see [employer] for the authenticated document.

## 2021-05-28 NOTE — PROGRESS NOTES
Patient: COLLEEN CONNOLLY     Acct: [id number]     Report: #[id number]  UNIT #: [id number]     : 1950    Encounter Date:2019  PRIMARY CARE: SATISH CASTRO  ***Signed***  --------------------------------------------------------------------------------------------------------------------  NURSE INTAKE      Visit Type      Established Patient Visit            Chief Complaint      TX      Intent of Therapy:  Palliative            Referring Provider/Copies To      Provider Not Found in Lookup:  DANA RODRIGUEZ      Primary Care Provider:  SATISH CASTRO            History and Present Illness      Past Oncology Illness History      Mr. Connolly is a 68-year-old white male who presents today for evaluation and     discussion of treatment options for his recently diagnosed esophageal cancer.      Patient states that this was initially identified when he was noted to be iron     deficient by his rheumatologist who was doing routine blood work for follow-up     of his methotrexate and rheumatoid arthritis treatment.  He underwent     colonoscopy which was benign as well as EGD at his local hospital that showed a     lesion in the distal esophagus.  He was seen by surgical oncology at [facility], Dr. Do.  Repeat EGD confirmed lesion in the lower     esophagus/GE junction area.  Biopsies showed marked atypia.  Surgery was     considered but patient and family desired a second opinion.  They were then seen    by CT surgery at [facility], Dr. Girard who repeated the     testing and this time biopsy was positive for adenocarcinoma.  He underwent     staging endoscopic ultrasound which identified a lesion in the distal esophagus,    GE junction, cardia of the stomach.  A cardia lymph node was also positive on     FNA giving him N1 disease.  His [facility] have recommended     neoadjuvant concurrent chemo RT followed by resection based on the trial CROSS     trial.   Patient states that he has had no issues with swallowing, dysphagia,     stomach or chest pain, weight loss, nausea or vomiting.  He continues to take     methotrexate for his rheumatoid arthritis and has considerable difficulties shiela    ecially with his hands.  He reports that he has recently completed intravenous     iron therapy.      Upon simulation for esophageal radiation-an area was found on the liver--right     lobe of liver.  MRI liver showed 9 mm lesion in the inferior right hepatic lobe     shows features suspicious for a single hepatic metastasis, particularly given     history of malignancy.       Started on palliative FOLFOX            HPI - Oncology Interim      F/u met esophageal ca--due for C2d1 Cyramza/Taxol--he reports feeling well.  He     has had minimal n/v.  His BLE remains swollen-wears his support hose daily excep    t MD appts.  He will see cardio on Friday and will discuss edema.  His     arthralgia actually improves with treatment.  Reports ankle discomfort is worst;    however, not crippling.  Appetite is good; wt is stable.  Denies difficulty     swallowing or bowels/bladder issues at this time.            Cancer Details            Adenocarcinoma of gastroesophageal junction            Metastatic Sites      Liver            Clinical Staging      Stage IV            Treatments      Chemotherapy      3/6/19-4/17/17 completed 4C FOLFOX  progression  5/22/19-7/3/19 completed 3C     Keytruda   progression  Cyramza/Taxol initiated 8/7/19            Clinical Trial Participant      No            ECOG Performance Status      0            Most Recent Lab Findings      Laboratory Tests      9/4/19 08:40            PAST, FAMILY   Past Medical History      Past Medical History:  Arthritis, Heart Attack, Hypertension      Hematology/Oncology (M):  GI Cancer, Oral Cancer, Skin Cancer            Past Surgical History      Biopsy (LIVER), Coronary Stent, Joint Replacement, Skin Cancer Removal             Family History      Family History:  Uterine Cancer            Social History      Marital Status:        Lives independently:  Yes      Number of Children:  3      Occupation:  RETIRED            Tobacco Use      Tobacco status:  Former smoker            Alcohol Use      Alcohol intake:  None            Substance Use      Substance use:  Denies use            REVIEW OF SYSTEMS      General:  Denies: Fatigue      Eye:  Admits Blurred Vision      ENT:  Admits Hearing Loss      Cardiovascular:  Denies Chest Pain      Respiratory:  Denies: Shortness of Air      Gastrointestinal:  Denies Constipation, Denies Diarrhea      Musculoskeletal:  Admits Muscle Pain (ANKLES AND FEET)      Integumentary:  Admits Bruises Easily      Neurologic:  Admits Numbness\Tingling (FEET)      Hematologic/Lymphatic:  Denies Bruising            VITAL SIGNS AND SCORES      Vitals      Weight 252 lbs 3.300 oz / 114.4 kg      Temperature 98.6 F / 37 C - Temporal      Pulse 69      Respirations 18      Blood Pressure 127/78 Sitting      Pulse Oximetry 94%            Pain Score      Pain Scale Used:  Numerical      Pain Intensity:  3            Fatigue Score      Fatigue (0-10 scale):  5            EXAM      General Appearance:  Positive for: Alert, Oriented x3, Cooperative;          Negative for: Acute Distress      Neck:  Positive for: Supple;          Negative for: JVD, Masses      Respiratory:  Positive for: CTAB, Normal Respiratory Effort      Chest:  Port in Place      Abdomen/Gastro:  Positive for: Normal Active Bowel Sounds, Soft;          Negative for: Distention, Hepatosplenomegaly, Tenderness      Cardiovascular:  Positive for: Peripheral Edema (2+ bilateral lower extremity),     RRR;          Negative for: Gallop, Murmur, Rub      Psychiatric:  Positive for: Appropriate Affect, Intact Judgement      Lymphatic:  Negative for: Cervical, Infraclavicular, Supraclavicular            PREVENTION      Hx Influenza Vaccination:  Yes       Date Influenza Vaccine Given:  Dec 1, 2018      2 or More Falls Past Year?:  No      Fall Past Year with Injury?:  No      Encouraged to follow-up with:  PCP regarding preventative exams.      Chart initiated by      DIANA COSME MA            ALLERGY/MEDS      Allergies      Coded Allergies:             NO KNOWN DRUG ALLERGIES (Verified  Allergy, Unknown, 9/4/19)            Medications      Last Reconciled on 9/4/19 09:23 by YADI JC      PACLitaxel (taxOL) 6 Mg/1 Ml Vial      0 IV D 1, 8      Reported         7/31/19       (Cyramza)   No Conflict Check      IV D 1      Reported         7/31/19       Telmisartan (Micardis) 20 Mg Tab      20 MG PO QDAY, TAB         Reported         7/31/19       Methotrexate Sodium (Methotrexate) 2.5 Mg Tab      18 MG PO Watson, TAB         Reported         7/31/19       Allopurinol (Allopurinol) 300 Mg Tablet      300 MG PO QDAY, #30 TAB 0 Refills         Reported         6/12/19       Ubidecarenone (Co Q-10) 1 Each Capsule      100 MG PO QDAY, CAP         Reported         5/15/19       Folic Acid (Folic Acid*) 1 Mg Tablet      1 MG PO QDAY, #30 TAB 0 Refills         Reported         5/1/19       amLODIPine (amLODIPine) 2.5 Mg Tablet      2.5 MG PO QDAY, #30 TAB 0 Refills         Reported         5/1/19       Lidocaine/Prilocaine (Emla) 30 Gm Cream..g.      1 APL TOPICAL ONCE for apply to port site w/chemo, #2 GM 6 Refills         Prov: RUBA JUNE         2/26/19       Prochlorperazine Maleate (Prochlorperazine Maleate) 10 Mg Tab      10 MG PO Q6H PRN for NAUSEA AND/OR VOMITING for 30 Days, #120 TAB 3 Refills         Prov: DEBBIE TAVAREZ         2/13/19       Ondansetron Hcl (ONDANSETRON HCL) 4 Mg Tablet      8 MG PO Q8H PRN for NAUSEA AND/OR VOMITING for 30 Days, #180 TAB 3 Refills         Prov: DEBBIE TAVAREZ         2/13/19       Aspirin Chew (Aspirin Baby) 81 Mg Tab.chew      81 MG PO QDAY, #30 TAB.CHEW 0 Refills         Reported         2/13/19        Furosemide* (Lasix*) 40 Mg Tablet      40 MG PO QDAY, #30 TAB 0 Refills         Reported         2/13/19       Pravastatin Sod (Pravastatin*) 40 Mg Tablet      80 MG PO QDAY, #60 TAB 0 Refills         Reported         2/13/19       Metoprolol Tartrate (Metoprolol Tartrate) 25 Mg Tablet      25 MG PO BID, #60 TAB 0 Refills         Reported         2/13/19       Prednisolone* (Prednisolone*) 5 Mg Tab      5 MG PO QDAY, TAB         Reported         2/13/19       Pantoprazole (Protonix*) 20 Mg Tablet      40 MG PO BIDAC, #120 TAB 0 Refills         Reported         2/13/19      Medications Reviewed:  No Changes made to meds            IMPRESSION/PLAN      Diagnosis      Metastasis from esophageal cancer - C79.9, C15.9      Patient is on third line treatment with Taxotere and Cyramza.  He is due for     cycle 2.  He tolerated his initial cycle well.  Lab work is adequate for     treatment.  I will plan to see him back for cycle 3 with labs and restaging     scans prior      New Diagnostics      * CT Abd/Pelvis/Chest W/Contrast, 4 Weeks            Patient Education            Herbal Medicine (Alternative Therapy)      Patient Education Provided:  Yes            Topics Patient Counseled on      Herbal medications                 Disclaimer: Converted document may not contain table formatting or lab diagrams. Please see [employer] for the authenticated document.

## 2021-05-28 NOTE — PROGRESS NOTES
Patient: COLLEEN CONNOLLY     Acct: [id number]     Report: #[id number]  UNIT #: [id number]     : 1950    Encounter Date:2019  PRIMARY CARE: SATISH CASTRO  ***Signed***  --------------------------------------------------------------------------------------------------------------------  NURSE INTAKE      Visit Type      Established Patient Visit            Chief Complaint      METS ESOPHAGEAL CA      Intent of Therapy:  Palliative            Referring Provider/Copies To      Provider Not Found in Lookup:  DANA RODRIGUEZ      Primary Care Provider:  SATISH CASTRO            History and Present Illness      Past Oncology Illness History      Mr. Connolly is a 68-year-old white male who presents today for evaluation and     discussion of treatment options for his recently diagnosed esophageal cancer.      Patient states that this was initially identified when he was noted to be iron     deficient by his rheumatologist who was doing routine blood work for follow-up     of his methotrexate and rheumatoid arthritis treatment.  He underwent     colonoscopy which was benign as well as EGD at his local hospital that showed a     lesion in the distal esophagus.  He was seen by surgical oncology at [facility], Dr. Do.  Repeat EGD confirmed lesion in the lower     esophagus/GE junction area.  Biopsies showed marked atypia.  Surgery was     considered but patient and family desired a second opinion.  They were then seen    by CT surgery at [facility], Dr. Girard who repeated the merry    ting and this time biopsy was positive for adenocarcinoma.  He underwent staging    endoscopic ultrasound which identified a lesion in the distal esophagus, GE     junction, cardia of the stomach.  A cardia lymph node was also positive on FNA     giving him N1 disease.  His [place] physicians have recommended neoadjuvant     concurrent chemo RT followed by resection based on the trial  CROSS trial.      Patient states that he has had no issues with swallowing, dysphagia, stomach or     chest pain, weight loss, nausea or vomiting.  He continues to take methotrexate     for his rheumatoid arthritis and has considerable difficulties especially with     his hands.  He reports that he has recently completed intravenous iron therapy.      Upon simulation for esophageal radiation-an area was found on the liver--right     lobe of liver.  MRI liver showed 9 mm lesion in the inferior right hepatic lobe     shows features suspicious for a single hepatic metastasis, particularly given     history of malignancy.       Started on palliative FOLFOX            HPI - Oncology Interim      F/u met esophageal ca--due for #2 Keytruda--had significant joint     pain/discomfort from his rheumatoid arthritis with 1st tx.  He is unsure if tx     or coincidence; however, he stayed in bed for a couple of days.  Rheumatology,     Dr. Newsome instructed to restart Methotrexate.  He did have a few hydrocodone     from prior surgery that he took and helped.  It managed a bit; however, still     had pain.  Pt reports good appetite.  BLE with edema noted.  Denies dysphasia or    abdominal pain/discomfort at this time.  No fever or distress noted.            Cancer Details            Adenocarcinoma of gastroesophageal junction            Metastatic Sites      Liver            Clinical Staging      Stage IV            Treatments      Chemotherapy      3/6/19-4/17/17 completed 4C FOLFOX  progression  Keytruda prescribed 5/15/19            Clinical Trial Participant      No            ECOG Performance Status      0            Most Recent Lab Findings      Laboratory Tests      6/12/19 09:40            PAST, FAMILY   Past Medical History      Past Medical History:  Arthritis, Heart Attack, Hypertension      Hematology/Oncology (M):  GI Cancer, Oral Cancer, Skin Cancer            Past Surgical History      Biopsy (LIVER), Coronary  Stent, Joint Replacement, Skin Cancer Removal            Family History      Family History:  Uterine Cancer            Social History      Marital Status:        Lives independently:  Yes      Number of Children:  3      Occupation:  RETIRED            Tobacco Use      Tobacco status:  Former smoker            Alcohol Use      Alcohol intake:  None            Substance Use      Substance use:  Denies use            REVIEW OF SYSTEMS      General:  Admits: Fatigue;          Denies: Appetite Change, Fever, Night Sweats, Weight Gain, Weight Loss      Eye:  Denies: Blurred Vision, Corrective Lenses, Diplopia, Eye Irritation, Eye     Pain, Eye Redness, Spots in Vision, Vision Loss      ENT:  Denies: Headache, Hearing Loss, Hoarseness, Seizures, Sinus Congestion,     Sore Throat      Cardiovascular:  Admits: Edema Ankles;          Denies: Chest Pain, Edema Legs, Irregular Heartbeat, Palpitations      Respiratory:  Denies: Coughing Blood, Productive Cough, Shortness of Air,     Wheezing      Gastrointestinal:  Denies: Bloody Stools, Constipation, Diarrhea, Frequent     Heartburn, Nausea, Problem Swallowing, Tarry Stools, Unable to Control Bowels,     Vomiting      Genitourinary (male):  Denies: Blood in Urine, Decrease Urine Stream, Frequent     Urination, Incontinence, Painful Urination      Musculoskeletal:  Denies: Back Pain, Leg Cramps, Muscle Pain, Muscle Weakness,     Painful Joints, Swollen Joints      Integumentary:  Denies: Bleeds Easily, Bruises Easily, Hair Changes, Jaundice,     Lesions, Mole Changes, Nail Changes, Pigment Changes, Rash, Skin Discoloration      Neurologic:  Denies: Dizziness, Fainting, Numbness\Tingling, Paralysis, Seizures      Psychiatric:  Denies: Anxiety, Confused, Depression, Disoriented, Memory Loss      Endocrine:  Denies: Cold Intolerance, Diabetes, Excessive Sweating, Excessive     Thirst, Excessive Urination, Heat Intolerance, Flushing, Hyperthyroidism,     Hypothyroidism       Hematologic/Lymphatic:  Denies: Bruising, Bleeding, Enlarged Lymph Nodes, Rec    urrent Infections, Transfusions            VITAL SIGNS AND SCORES      Vitals      Height 5 ft 9 in / 175.26 cm      Weight 255 lbs 8.210 oz / 115.9 kg      BSA 2.29 m2      BMI 37.7 kg/m2      Temperature 97.5 F / 36.39 C - Temporal      Pulse 69      Respirations 20      Blood Pressure 137/87 Sitting      Pulse Oximetry 94%, RM AIR            Pain Score      Pain Scale Used:  Numerical      Pain Intensity:  4            Fatigue Score      Fatigue (0-10 scale):  3            EXAM      General Appearance:  Positive for: Alert, Oriented x3, Cooperative;          Negative for: Acute Distress      Eye:  Positive for: Anicteric Sclerae, Moist Conjunctiva      Neck:  Positive for: Supple;          Negative for: JVD, Masses      Respiratory:  Positive for: CTAB, Normal Respiratory Effort      Chest:  Port in Place      Abdomen/Gastro:  Positive for: Normal Active Bowel Sounds, Soft;          Negative for: Distention, Hepatosplenomegaly, Tenderness      Cardiovascular:  Positive for: Peripheral Edema (1+ bilateral), RRR;          Negative for: Gallop, Murmur, Rub      Psychiatric:  Positive for: Appropriate Affect, Intact Judgement      Other      Synovitis on the hands, wrists, knees      Lymphatic:  Negative for: Cervical, Infraclavicular, Supraclavicular            PREVENTION      Hx Influenza Vaccination:  Yes      Date Influenza Vaccine Given:  Dec 1, 2018      2 or More Falls Past Year?:  No      Fall Past Year with Injury?:  No      Encouraged to follow-up with:  PCP regarding preventative exams.      Chart initiated by      DIANA COSME MA            ALLERGY/MEDS      Allergies      Coded Allergies:             NO KNOWN DRUG ALLERGIES (Verified  Allergy, Unknown, 6/12/19)            Medications      Last Reconciled on 6/12/19 10:18 by YADI JC      Hydrocodone/Acetaminophen 5/325 MG (Hydrocodone/Acetaminophen 5/325  MG) 1 Each     Tablet      1 TAB PO Q6H PRN for BREAKTHROUGH PAIN, TAB 0 Refills         Reported         6/12/19       Allopurinol (Allopurinol) 300 Mg Tablet      300 MG PO QDAY, #30 TAB 0 Refills         Reported         6/12/19       Methotrexate Sodium (Methotrexate) 2.5 Mg Tab      12.5 MG PO Watson, TAB         Reported         5/22/19       Ubidecarenone (Co Q-10) 1 Each Capsule      100 MG PO QDAY, CAP         Reported         5/15/19       Folic Acid (Folic Acid*) 1 Mg Tablet      1 MG PO QDAY, #30 TAB 0 Refills         Reported         5/1/19       amLODIPine (amLODIPine) 2.5 Mg Tablet      2.5 MG PO QDAY, #30 TAB 0 Refills         Reported         5/1/19       Lidocaine/Prilocaine (Emla) 30 Gm Cream..g.      1 APL TOPICAL ONCE for apply to port site w/chemo, #2 GM 6 Refills         Prov: RUBA JUNE         2/26/19       Prochlorperazine Maleate (Prochlorperazine Maleate) 10 Mg Tab      10 MG PO Q6H PRN for NAUSEA AND/OR VOMITING for 30 Days, #120 TAB 3 Refills         Prov: DEBBIE TAVAREZ         2/13/19       Ondansetron Hcl (ONDANSETRON HCL) 4 Mg Tablet      8 MG PO Q8H PRN for NAUSEA AND/OR VOMITING for 30 Days, #180 TAB 3 Refills         Prov: DEBBIE TAVAREZ         2/13/19       Aspirin Chew (Aspirin Baby) 81 Mg Tab.chew      81 MG PO QDAY, #30 TAB.CHEW 0 Refills         Reported         2/13/19       Furosemide* (Lasix*) 40 Mg Tablet      40 MG PO QDAY, #30 TAB 0 Refills         Reported         2/13/19       Pravastatin Sod (Pravastatin*) 40 Mg Tablet      80 MG PO QDAY, #60 TAB 0 Refills         Reported         2/13/19       Metoprolol Tartrate (Metoprolol Tartrate) 25 Mg Tablet      25 MG PO BID, #60 TAB 0 Refills         Reported         2/13/19       Prednisolone* (Prednisolone*) 5 Mg Tab      5 MG PO QDAY, TAB         Reported         2/13/19       Telmisartan (Micardis*) 40 Mg Tablet      80 MG PO QDAY, TAB         Reported         2/13/19       Pantoprazole (Protonix*) 20 Mg Tablet      40  MG PO BIDAC, #120 TAB 0 Refills         Reported         2/13/19      Medications Reviewed:  Changes made to meds            IMPRESSION/PLAN      Impression      Esophageal cancer, metastatic.  Rheumatoid arthritis.            Diagnosis      Metastasis from esophageal cancer - C79.9, C15.9      Patient is on single agent Keytruda.  Tolerated his initial dose reasonably     well.  He did have a flare of his rheumatoid arthritis which may or may not be     related to the Keytruda.  He is due for cycle 2.  Proceed with treatment as     planned.  RTC 3 weeks for OV, cycle 3 with labs prior            Rheumatoid arthritis         Rheumatoid arthritis involving multiple sites, unspecified rheumatoid factor        presence - M06.9         Rheumatoid arthritis location: multiple sites         Rheumatoid factor presence: unspecified presence      Patient is now back on methotrexate but is still having significant synovitis     and pain.  I recommended over-the-counter Motrin 400 mg 4 times a day along with    the methotrexate for the short-term.  He should follow-up with rheumatology if     his joint pain does not improve.  I did give him a small number of hydrocodone     to use for more severe pain if needed.            Notes      New Medications      * Hydrocodone/Acetaminophen 5/325 MG 1 EACH TABLET: 1 TAB PO Q6H PRN       BREAKTHROUGH PAIN            Patient Education            Constipation      Hydrocodone      Patient Education Provided:  Yes                 Disclaimer: Converted document may not contain table formatting or lab diagrams. Please see [employer] for the authenticated document.

## 2021-05-28 NOTE — PROGRESS NOTES
Patient: COLLEEN CONNOLLY     Acct: [id number]     Report: #[id number]  UNIT #: [id number]     : 1950    Encounter Date:2020  PRIMARY CARE: SATISH CASTRO  ***Signed***  --------------------------------------------------------------------------------------------------------------------  NURSE INTAKE      Visit Type      Established Patient Visit            Chief Complaint      CHEMO TX      Intent of Therapy:  Palliative            Referring Provider/Copies To      Primary Care Provider:  SATISH CASTRO      Copies To:   SATISH CASTRO            History and Present Illness      Past Oncology Illness History      Mr. Connolly is a 69-year-old white male who presents with esophageal cancer.      Patient states that this was initially identified when he was noted to be iron     deficient by his rheumatologist who was doing routine blood work for follow-up     of his methotrexate and rheumatoid arthritis treatment.  He was seen by surgical    oncology at [facility], Dr. Do.  Repeat EGD confirmed lesion     in the lower esophagus/GE junction area.  Biopsies showed marked atypia.      Surgery was considered but patient and family desired a second opinion.  They     were then seen by CT surgery at [facility], Dr. Girard who     repeated the testing and this time biopsy was positive for adenocarcinoma.  He     underwent staging endoscopic ultrasound which identified a lesion in the distal     esophagus, GE junction, cardia of the stomach.  A cardia lymph node was also     positive on FNA giving him N1 disease.  His [place] physicians have     recommended neoadjuvant concurrent chemo RT followed by resection based on the     trial CROSS trial.  Upon simulation for esophageal radiation-an area was found     on the liver--right lobe of liver.  MRI liver showed 9 mm lesion in the inferior    right hepatic lobe shows features suspicious for a single hepatic metastasis,      particularly given history of malignancy.4/22/20 Cycle 10 day 15 held due to     increased productive cough. 10 days of antibiotics prescribed.            HPI - Oncology Interim      Patient returns for ongoing treatment of his metastatic esophageal cancer.  He     had been on Taxol and Cyramza however Taxol was omitted from the regimen     secondary to increasing neuropathy.  Patient states that his numbness and tingl    ing is slightly better.  He continues to have significant arthritic pain from     his underlying rheumatoid arthritis.  He is taking methotrexate under the care     of his rheumatologist.  He reports good appetite and his weight is maintained.      He denies any swallowing issues, nausea, vomiting or heartburn.  Port-A-Cath is     working normally.  He reports good energy level.  He denies any unusual aches or    pains.            Cancer Details            Adenocarcinoma of gastroesophageal junction            Metastatic Sites      Liver            Clinical Staging      Stage IV            Treatments      Chemotherapy      3/6/19-4/17/17 completed 4C FOLFOX  progression  5/22/19-7/3/19 completed 3C     Keytruda   progression  Cyramza/Taxol initiated 8/7/19            Clinical Trial Participant      No            ECOG Performance Status      0            Most Recent Lab Findings            Item Value  Date Time             Sodium Level 138 mmol/L 6/17/20 0855             Potassium Level 3.4 mmol/L L 6/17/20 0855             Chloride Level 102 mmol/L 6/17/20 0855             Carbon Dioxide Level 27 mmol/L 6/17/20 0855             Anion Gap 12 mmol/L 6/17/20 0855             Blood Urea Nitrogen 21 mg/dL 6/17/20 0855             Creatinine 0.91 mg/dL 6/17/20 0855             Glomerular Filtration Rate Calc >60 mL/min/{1.73_m2} 6/17/20 0855             BUN/Creatinine Ratio 23 {ratio} H 6/17/20 0855             Glucose Level 169 mg/dL H 6/17/20 0855            Laboratory Tests      6/3/20 09:43             6/17/20 08:57            PAST, FAMILY   Past Medical History      Past Medical History:  Arthritis, Heart Attack, Hypertension      Hematology/Oncology (M):  GI Cancer, Oral Cancer, Skin Cancer            Past Surgical History      Biopsy (LIVER), Coronary Stent, Joint Replacement, Skin Cancer Removal            Family History      Family History:  Uterine Cancer            Social History      Marital Status:        Lives independently:  Yes      Number of Children:  3      Occupation:  RETIRED            Tobacco Use      Tobacco status:  Former smoker            Alcohol Use      Alcohol intake:  None            Substance Use      Substance use:  Denies use            REVIEW OF SYSTEMS      General:  Admits: Fatigue      Eye:  Admits Corrective Lenses      ENT:  Admits Hearing Loss      Cardiovascular:  Denies Chest Pain      Respiratory:  Denies: Cough, Coughing Blood      Gastrointestinal:  Denies Constipation, Denies Diarrhea      Musculoskeletal:  Admits Muscle Pain, Admits Painful Joints            VITAL SIGNS AND SCORES      Vitals      Weight 259 lbs 0.648 oz / 117.5 kg      Temperature 98.3 F / 36.83 C - Temporal      Pulse 74      Respirations 20      Blood Pressure 134/69 Sitting      Pulse Oximetry 98%, RM AIR            Pain Score      Pain Scale Used:  Numerical      Pain Intensity:  2            Fatigue Score      Fatigue (0-10 scale):  3            EXAM      General Appearance:  Positive for: Alert, Cooperative;          Negative for: Acute Distress      Eye:  Positive for: Anicteric Sclerae, Moist Conjunctiva      Neck:  Positive for: Supple;          Negative for: JVD, Masses      Respiratory:  Positive for: CTAB, Normal Respiratory Effort      Chest:  Port in Place      Abdomen/Gastro:  Positive for: Normal Active Bowel Sounds, Soft;          Negative for: Distention, Hepatosplenomegaly, Tenderness      Cardiovascular:  Positive for: RRR;          Negative for: Gallop, Murmur, Peripheral  Edema, Rub      Psychiatric:  Positive for: Appropriate Affect, Intact Judgement      Lymphatic:  Negative for: Cervical, Infraclavicular, Supraclavicular            PREVENTION      Hx Influenza Vaccination:  Yes      Date Influenza Vaccine Given:  Nov 1, 2019      2 or More Falls Past Year?:  No      Fall Past Year with Injury?:  No      Encouraged to follow-up with:  PCP regarding preventative exams.      Chart initiated by      DIANA COSME MA            ALLERGY/MEDS      Allergies      Coded Allergies:             NO KNOWN DRUG ALLERGIES (Verified  Allergy, Unknown, 6/17/20)            Medications      Last Reconciled on 6/17/20 13:15 by DEBBIE TAVAREZ      Mouthwash (Magic Mouthwash) 1 Each Bottle      15 ML PO Q4H PRN for MOUTH DISCOMFORT, #300 ML 1 Refill         Prov: DEBBIE TAVAREZ         5/20/20       Potassium Chloride (K-Tab*) 10 Meq Tablet.er      20 MEQ PO QDAY for low potassium for 30 Days, #60 TAB 3 Refills         Prov: DEBBIE TAVAREZ         5/18/20       Irbesartan (Irbesartan) 300 Mg Tablet      300 MG PO QDAY for 30 Days, #30 TAB         Reported         1/29/20       Pyridoxine (Vitamin B6) 100 Mg Tablet      100 MG PO BID, TAB         Reported         11/13/19       DULoxetine (Cymbalta) 20 Mg Capsule.dr      20 MG PO QDAY for 30 Days, #30 CAP 5 Refills         Prov: DEBBIE TAVAREZ         11/6/19       Methotrexate Sodium (Methotrexate) 2.5 Mg Tab      12.5 MG PO Watson, TAB         Reported         11/6/19       (herbs)   No Conflict Check               Reported         10/2/19       Chlorthalidone (CHLORTHALIDONE) 25 Mg Tablet      12.5 MG PO QDAY for 30 Days, #15 TAB         Reported         10/2/19       Telmisartan (Micardis) 20 Mg Tab      20 MG PO QDAY, TAB         Reported         7/31/19       Allopurinol (Allopurinol) 300 Mg Tablet      300 MG PO QDAY, #30 TAB 0 Refills         Reported         6/12/19       Ubidecarenone (Co Q-10) 1 Each Capsule      100 MG PO QDAY, CAP          Reported         5/15/19       Folic Acid (Folic Acid) 1 Mg Tablet      1 MG PO QDAY, #30 TAB 0 Refills         Reported         5/1/19       Lidocaine/Prilocaine (Emla) 30 Gm Cream..g.      1 APL TOPICAL ONCE for apply to port site w/chemo, #2 GM 6 Refills         Prov: RUBA JUNE         2/26/19       Prochlorperazine Maleate (Prochlorperazine Maleate) 10 Mg Tab      10 MG PO Q6H PRN for NAUSEA AND/OR VOMITING for 30 Days, #120 TAB 3 Refills         Prov: DEBBIE TAVAREZ         2/13/19       Ondansetron Hcl (ONDANSETRON HCL) 4 Mg Tablet      8 MG PO Q8H PRN for NAUSEA AND/OR VOMITING for 30 Days, #180 TAB 3 Refills         Prov: CORBYDEBBIE         2/13/19       Aspirin Chew (Aspirin Baby) 81 Mg Tab.chew      81 MG PO QDAY, #30 TAB.CHEW 0 Refills         Reported         2/13/19       Furosemide* (Lasix*) 40 Mg Tablet      40 MG PO QDAY, #30 TAB 0 Refills         Reported         2/13/19       Metoprolol Tartrate (Metoprolol Tartrate) 25 Mg Tablet      25 MG PO BID, #60 TAB 0 Refills         Reported         2/13/19       prednisoLONE (prednisoLONE) 5 Mg Tab      5 MG PO QDAY, TAB         Reported         2/13/19       Pantoprazole (Protonix) 20 Mg Tablet      40 MG PO BIDAC, #120 TAB 0 Refills         Reported         2/13/19      Medications Reviewed:  No Changes made to meds            IMPRESSION/PLAN      Diagnosis      Malignant neoplasm of lower third of esophagus - C15.5            Metastasis from esophageal cancer - C79.9, C15.9            Notes      Patient is now on maintenance Cyramza after very good partial response to second     line Taxol plus Cyramza.  Neuropathy is slowly improving.  He is tolerating his     treatments well.  Lab work is adequate for treatment.  Proceed with cycle as     planned.  I will see him back on day 1 of next cycle with labs prior.  I will     plan repeat imaging in July.            Patient Education      Patient Education Provided:  Yes            Electronically  signed by DEBBIE TAVRAEZ  06/17/2020 13:15       Disclaimer: Converted document may not contain table formatting or lab diagrams. Please see [employer] for the authenticated document.

## 2021-05-28 NOTE — PROGRESS NOTES
Patient: COLLEEN CONNOLLY     Acct: [id number]     Report: #[id number]  UNIT #: [id number]     : 1950    Encounter Date:2020  PRIMARY CARE: SATISH CASTRO  ***Signed***  --------------------------------------------------------------------------------------------------------------------  NURSE INTAKE      Visit Type      Established Patient Visit            Chief Complaint      CHEMO TX      Intent of Therapy:  Palliative            Referring Provider/Copies To      Primary Care Provider:  SATISH CASTRO      Copies To:   SATISH CASTRO            History and Present Illness      Past Oncology Illness History      Mr. Connolly is a 69-year-old white male who presents with esophageal cancer.      Patient states that this was initially identified when he was noted to be iron     deficient by his rheumatologist who was doing routine blood work for follow-up     of his methotrexate and rheumatoid arthritis treatment.  He was seen by surgical    oncology at [facility], Dr. Do.  Repeat EGD confirmed lesion     in the lower esophagus/GE junction area.  Biopsies showed marked atypia.      Surgery was considered but patient and family desired a second opinion.  They     were then seen by CT surgery at [facility], Dr. Girard who     repeated the testing and this time biopsy was positive for adenocarcinoma.  He     underwent staging endoscopic ultrasound which identified a lesion in the distal     esophagus, GE junction, cardia of the stomach.  A cardia lymph node was also     positive on FNA giving him N1 disease.  His [place] physicians have     recommended neoadjuvant concurrent chemo RT followed by resection based on the     trial CROSS trial.  Upon simulation for esophageal radiation-an area was found     on the liver--right lobe of liver.  MRI liver showed 9 mm lesion in the inferior    right hepatic lobe shows features suspicious for a single hepatic metastasis,      particularly given history of malignancy.            HPI - Oncology Interim      Patient returns today for consideration of cycle 10 of palliative chemotherapy     for his metastatic adenocarcinoma of the GE junction.  Patient is on Taxol plus     Cyramza.  He reports that his chemotherapy is going well although with cycle 9,     he missed day 15 due to neutropenia.  His counts did recover and he was able to     continue therapy.  He reports he is got a little touch of head cold but     improving.  He denies any other febrile illnesses or infectious symptoms.  He     denies any masses lymphadenopathy.  Denies problems with Port-A-Cath.  He     reports good appetite without nausea, vomiting or dysphagia.  He denies numbness    or tingling in hands or feet.  He denies excessive bruising or bleeding.            Cancer Details            Adenocarcinoma of gastroesophageal junction            Metastatic Sites      Liver            Clinical Staging      Stage IV            Treatments      Chemotherapy      3/6/19-4/17/17 completed 4C FOLFOX  progression  5/22/19-7/3/19 completed 3C     Keytruda   progression  Cyramza/Taxol initiated 8/7/19            Clinical Trial Participant      No            ECOG Performance Status      0            Most Recent Lab Findings      Laboratory Tests      4/8/20 09:05            PAST, FAMILY   Past Medical History      Past Medical History:  Arthritis, Heart Attack, Hypertension      Hematology/Oncology (M):  GI Cancer, Oral Cancer, Skin Cancer            Past Surgical History      Biopsy (LIVER), Coronary Stent, Joint Replacement, Skin Cancer Removal            Family History      Family History:  Uterine Cancer            Social History      Marital Status:        Lives independently:  Yes      Number of Children:  3      Occupation:  RETIRED            Tobacco Use      Tobacco status:  Former smoker            Alcohol Use      Alcohol intake:  None            Substance Use       Substance use:  Denies use            REVIEW OF SYSTEMS      General:  Admits: Fatigue      Eye:  Admits Corrective Lenses      ENT:  Admits Hearing Loss, Admits Sore Throat      Respiratory:  Admits: Productive Cough, Shortness of Air      Gastrointestinal:  Admits Nausea/Vomiting; Denies Constipation      Neurologic:  Admits Numbness\Tingling (FEET)      Psychiatric:  Denies Anxiety, Denies Depression            VITAL SIGNS AND SCORES      Vitals      Weight 262 lbs 9.086 oz / 119.1 kg      Temperature 98.1 F / 36.72 C - Temporal      Pulse 69      Respirations 20      Blood Pressure 160/77 Sitting      Pulse Oximetry 94%, RM AIR            Pain Score      Pain Scale Used:  Numerical      Pain Intensity:  0            Fatigue Score      Fatigue (0-10 scale):  5            EXAM      General Appearance:  Positive for: Alert, Cooperative;          Negative for: Acute Distress      Eye:  Positive for: Anicteric Sclerae, Moist Conjunctiva      Neck:  Positive for: Supple;          Negative for: JVD, Masses      Respiratory:  Positive for: CTAB, Normal Respiratory Effort      Chest:  Port in Place      Abdomen/Gastro:  Positive for: Normal Active Bowel Sounds, Soft;          Negative for: Distention, Hepatosplenomegaly, Tenderness      Cardiovascular:  Positive for: RRR;          Negative for: Gallop, Murmur, Peripheral Edema, Rub      Psychiatric:  Positive for: Appropriate Affect, Intact Judgement      Lymphatic:  Negative for: Cervical, Infraclavicular, Supraclavicular            PREVENTION      Hx Influenza Vaccination:  Yes      Date Influenza Vaccine Given:  Nov 1, 2019      2 or More Falls Past Year?:  No      Fall Past Year with Injury?:  No      Encouraged to follow-up with:  PCP regarding preventative exams.      Chart initiated by      DIANA COSME MA            ALLERGY/MEDS      Allergies      Coded Allergies:             NO KNOWN DRUG ALLERGIES (Verified  Allergy, Unknown, 4/8/20)            Medications       Last Reconciled on 4/8/20 14:06 by DEBBIE TAVAREZ      Irbesartan (Irbesartan) 300 Mg Tablet      300 MG PO QDAY for 30 Days, #30 TAB         Reported         2/5/20       Irbesartan (Irbesartan) 300 Mg Tablet      300 MG PO QDAY for 30 Days, #30 TAB         Reported         1/29/20       Potassium Chloride (K-Tab*) 10 Meq Tablet.er      20 MEQ PO QDAY for low potassium for 30 Days, #60 TAB 3 Refills         Prov: DEBBIE TAVAREZ         1/22/20       Pyridoxine (Vitamin B6) 100 Mg Tablet      100 MG PO BID, TAB         Reported         11/13/19       DULoxetine (Cymbalta) 20 Mg Capsule.dr      20 MG PO QDAY for 30 Days, #30 CAP 5 Refills         Prov: DEBBIE TAVAREZ         11/6/19       Methotrexate Sodium (Methotrexate) 2.5 Mg Tab      12.5 MG PO Watson, TAB         Reported         11/6/19       (herbs)   No Conflict Check               Reported         10/2/19       Chlorthalidone (CHLORTHALIDONE) 25 Mg Tablet      12.5 MG PO QDAY for 30 Days, #15 TAB         Reported         10/2/19       PACLitaxel (taxOL) 6 Mg/1 Ml Vial      0 IV D 1, 8      Reported         7/31/19       (Cyramza)   No Conflict Check      IV D 1      Reported         7/31/19       Telmisartan (Micardis) 20 Mg Tab      20 MG PO QDAY, TAB         Reported         7/31/19       Allopurinol (Allopurinol) 300 Mg Tablet      300 MG PO QDAY, #30 TAB 0 Refills         Reported         6/12/19       Ubidecarenone (Co Q-10) 1 Each Capsule      100 MG PO QDAY, CAP         Reported         5/15/19       Folic Acid (Folic Acid) 1 Mg Tablet      1 MG PO QDAY, #30 TAB 0 Refills         Reported         5/1/19       Lidocaine/Prilocaine (Emla) 30 Gm Cream..g.      1 APL TOPICAL ONCE for apply to port site w/chemo, #2 GM 6 Refills         Prov: RUBA JUNE         2/26/19       Prochlorperazine Maleate (Prochlorperazine Maleate) 10 Mg Tab      10 MG PO Q6H PRN for NAUSEA AND/OR VOMITING for 30 Days, #120 TAB 3 Refills         Prov: DEBBIE TAVAREZ          2/13/19       Ondansetron Hcl (ONDANSETRON HCL) 4 Mg Tablet      8 MG PO Q8H PRN for NAUSEA AND/OR VOMITING for 30 Days, #180 TAB 3 Refills         Prov: DEBBIE TAVAREZ         2/13/19       Aspirin Chew (Aspirin Baby) 81 Mg Tab.chew      81 MG PO QDAY, #30 TAB.CHEW 0 Refills         Reported         2/13/19       Furosemide* (Lasix*) 40 Mg Tablet      40 MG PO QDAY, #30 TAB 0 Refills         Reported         2/13/19       Metoprolol Tartrate (Metoprolol Tartrate) 25 Mg Tablet      25 MG PO BID, #60 TAB 0 Refills         Reported         2/13/19       prednisoLONE (prednisoLONE) 5 Mg Tab      5 MG PO QDAY, TAB         Reported         2/13/19       Pantoprazole (Protonix) 20 Mg Tablet      40 MG PO BIDAC, #120 TAB 0 Refills         Reported         2/13/19      Medications Reviewed:  No Changes made to meds            IMPRESSION/PLAN      Diagnosis      Malignant neoplasm of lower third of esophagus - C15.5      Patient is on palliative treatment with Taxol plus Cyramza.  His last cycle was     notable for neutropenia.  He will require 10% dose reduction on the Taxol with     all cycles moving forward.  Otherwise he is tolerating his treatments well.  I     will proceed with cycle 10-day 1 as planned.  He will return as scheduled.  I     will see him for cycle 11-day 1 with labs and restaging scans prior.            Notes      New Diagnostics      * CT Abd/Pelvis/Chest W/Contrast, 4 Weeks         Dx: Metastasis from esophageal cancer - C79.9, C15.9      * CBC With Auto Diff, 4 Weeks         Dx: Metastasis from esophageal cancer - C79.9, C15.9      * CMP Comp Metabolic Panel, 4 Weeks         Dx: Metastasis from esophageal cancer - C79.9, C15.9            Patient Education            Eat Well, Exercise Well, Be Well: Dietary and Fitness Guidelines      Patient Education Provided:  Yes            Electronically signed by DEBBIE TAVAREZ  04/08/2020 14:25       Disclaimer: Converted document may not contain table  formatting or lab diagrams. Please see [facility] for the authenticated document.

## 2021-06-01 PROBLEM — C15.5 MALIGNANT NEOPLASM OF LOWER THIRD OF ESOPHAGUS (HCC): Status: ACTIVE | Noted: 2019-01-17

## 2021-06-06 NOTE — PROGRESS NOTES
Patient: COLLEEN CONNOLLY     Acct: [id number]     Report: #[id number]  UNIT #: [id number]     : 1950    Encounter Date:2021  PRIMARY CARE: SATISH CASTRO  ***Signed***  --------------------------------------------------------------------------------------------------------------------  NURSE INTAKE      Visit Type      Established Patient Visit            Chief Complaint      ESOPHAGEAL CA      Intent of Therapy:  Palliative            Referring Provider/Copies To      Primary Care Provider:  SATISH CASTRO      Copies To:   SATISH CASTRO            History and Present Illness      Past Oncology Illness History      Mr. Connolly is a 70-year-old white male who presents with esophageal cancer.      Patient states that this was initially identified when he was noted to be iron     deficient by his rheumatologist who was doing routine blood work for follow-up     of his methotrexate and rheumatoid arthritis treatment.  He was seen by surgical    oncology at [facility], Dr. Do.  Repeat EGD confirmed lesion     in the lower esophagus/GE junction area.  Biopsies showed marked atypia.  Watson    rgery was considered but patient and family desired a second opinion.  They were    then seen by CT surgery at [facility], Dr. Girard who repeated    the testing and this time biopsy was positive for adenocarcinoma.  He underwent     staging endoscopic ultrasound which identified a lesion in the distal esophagus,    GE junction, cardia of the stomach.  A cardia lymph node was also positive on     FNA giving him N1 disease.  His [facility] physicians have recommended     neoadjuvant concurrent chemo RT followed by resection based on the trial CROSS     trial.  Upon simulation for esophageal radiation-an area was found on the     liver--right lobe of liver.  MRI liver showed 9 mm lesion in the inferior right     hepatic lobe shows features suspicious for a single hepatic metastasis,      particularly given history of malignancy.4/22/20 Cycle 10 day 15 held due to     increased productive cough. 10 days of antibiotics prescribed.  8/26/20     progression stopped Cyramza/Taxol started Irinotecan. 9/30/2020 patient     experience a significant amount of diarrhea after second treatment which require    d hospitalization. Irinotecan stopped and Lonsurf initiated. 12/9/20 CT scans     revealed progression. Referred to  for clinical trial options. 5/11/21 patient    presents to the clinic to discuss treatment options for his esophpageal cancer.     3/2021 Y-90 installations by Dr. Goyal at . 6/1/21 CT scans show     progression in the lungs. Discussed Taxotere. Patient reports difficulty swall    owing.            HPI - Oncology Interim      His lastPatient returns today for discussion of ongoing treatment for his     metastatic esophageal cancer.  Status post multiple lines of therapy.  He, he     has had restaging scans.  On return, he states that he is having more trouble     with swallowing and vomiting.  He does okay with liquids but foods are becoming     more difficult.  He denies any other unusual aches or pains.  He notes that his     rheumatoid arthritis is well controlled.  He denies any masses or adenopathy.      He has neuropathy from prior treatment which is stable.            Cancer Details            Adenocarcinoma of gastroesophageal junction            Clinical Staging      Stage IV            Treatments      Chemotherapy      3/6/19-4/17/17 completed 4C FOLFOX  progression  5/22/19-7/3/19 completed 3C     Keytruda   progression  Cyramza/Taxol initiated 8/7/19 8/26/20 progression     stopped Cyramza/Taxol started Irinotecan. 9/30/20 Irinitecan stopped and Lonsurf    initiated.      Radiation Therapy      3/2021 Y-90 installations by Dr. Goyal at       Surgeries      4/2021 Esophageal stent placed            Clinical Trial Participant      No            ECOG Performance  Status      0            Most Recent Lab Findings      Laboratory Tests      21 07:24            Most Recent Imaging Findings            Patient: COLLEEN BRAGG   Acct: #[id number]   Report: #[id number]            UNIT #: [id number]    DOS: 21 0708      INSURANCE:MEDICARE PART A   LOCATION:CT     : 1950            PROVIDERS      ADMITTING:     ATTENDING: DEBBIE TAVAREZ      FAMILY:  NONE,MD   ORDERING:  DEBBIE TAVAREZ         OTHER:    DICTATING:  Beny Hough MD            REQ #:[id number]   EXAM:CTACPWC - CT ABD CHEST PEL w CONTR      REASON FOR EXAM:  SECONDARY NEOPLASM OF UNSPEC SITE      REASON FOR VISIT:  SECONDARY NEOPLASM OF UNSPEC SITE            *******Signed******         PROCEDURE:   CT ABDOMEN; CT CHEST; CT PELVIS WITH CONTRAST             COMPARISON:   [facility], CT, CT CHEST ABD PEL W CONTRAST,     2020, 9:58.  [facility], PET, SKULL BASE TO MID THIGH SUBSEQ, 2021, 10:56.      [facility], CT, CT CHEST ABD PEL W CONTRAST, 2021, 8:30.             INDICATIONS:   SECONDARY NEOPLASM OF UNSPEC SITE             TECHNIQUE:   After obtaining the patient's consent, CT images were obtained with    intravenous contrast       material.             FINDINGS:         CT CHEST:      The subpleural nodule in the posterior left lung apex has significantly     increased in size, now       measuring 2.7 cm x 2.2 cm (axial series 3, image 17).  New or enlarged nodule in    the apex of the       right upper lobe measuring 0.7 cm (axial series 3, image 19).  Significantly     enlarged nodule in the       right middle lobe measuring 1.6 cm x 1.4 cm (axial series 3, image 50),     previously sub 4 mm.        Multiple additional new and enlarged pulmonary nodules are seen in both lungs.      The central airways       are widely patent.  No pneumothorax or pleural effusion.             The heart and great vessels of the chest are  normal in size.  Calcified coronary    artery disease.        Trace pericardial fluid.  New enlarged low-density left infrahilar lymph node     measuring 2 cm x 1.1       cm (axial series 2, image 41).  Calcified remnants of prior granulomatous     disease in the       mediastinum and right hilum.  Enlarged small hiatal hernia with increased     circumferential       thickening of the distal esophageal wall and gastric wall.  The left gastric     wall at the hiatal       hernia measures up to 1.9 cm in thickness (axial series 2, image 65).               Bilateral gynecomastia.  Similar-appearing thoracic scoliosis and spondylosis.      Partially imaged       left shoulder arthroplasty and degenerative changes in the right shoulder.  No     acute osseous       abnormality or concerning osseous lesion.             CT ABDOMEN AND PELVIS:        Multiple new enlarged hepatic masses, consistent with progression of hepatic     metastatic disease.        An index mass in the left lobe of the liver now measures 7.6 cm x 4.9 cm (axial     series 5, image       23), previously 5.8 cm x 4.6 cm by my measurements.  Cholelithiasis.  The     spleen, pancreas, adrenal       glands, and left kidney are unremarkable.  Stable nonobstructing 2-3 mm stone in    the lower pole of       the right kidney.  The right kidney is otherwise unremarkable.  The urinary     bladder is       nondistended.  Urinary bladder wall thickening is likely accentuated by     underdistention.  Enlarged       prostate gland, measuring 5.5 cm in transverse diameter.             Stable positioning of a stent within the stomach.  Increased proximal gastric     wall thickening.        Mild colorectal stool burden.  No bowel obstruction or significant bowel wall     thickening.  The       appendix is normal.             Trace free fluid in the pelvis.  No free intraperitoneal air.  New     pathologically enlarged       low-density gastrohepatic lymph nodes.  An  index bilobed lymph node or 2     subjacent lymph nodes       measures 2.9 cm x 1.7 cm (axial series 5, image 20).  Mild calcified     atherosclerotic disease in the       abdominal aorta and its distal branches without aneurysm.             Chronic bilateral L5 pars defects with associated grade 1-2 anterolisthesis of     L5 on S1 and severe       spondylosis at L5-S1.  Severe left and moderate right hip joint DJD.  No acute     osseous abnormality       or concerning osseous lesion.             CONCLUSION:         1. Progression of pulmonary metastatic disease.      2. Progression of hepatic metastatic disease.      3. Increased increased proximal gastric wall thickening and distal esophageal     wall thickening,       likely progression of disease.      4. New low-density left infrahilar lymphadenopathy, likely metastatic disease.      5. New enlarged low-density gastrohepatic lymph nodes, likely metastatic     disease.      6. Stable positioning of the stent within the stomach.              SENIA RODRIGUEZ MD             Electronically Signed and Approved By: SENIA RODRIGUEZ MD on 5/28/2021 at 8:58                               Until signed, this is an unconfirmed preliminary report that may contain      errors and is subject to change.                                              COLOT:      D:05/28/21 0858            PAST, FAMILY   Past Medical History      Past Medical History:  Arthritis, Heart Attack, Hypertension      Hematology/Oncology (M):  GI Cancer, Oral Cancer, Skin Cancer            Past Surgical History      Biopsy, Coronary Stent, Joint Replacement, Skin Cancer Removal            stent in esophagus            Family History      Family History:  Uterine Cancer            Social History      Marital Status:        Lives independently:  Yes      Number of Children:  3      Occupation:  RETIRED            Tobacco Use      Tobacco status:  Former smoker      Currently Vaping:  No            Alcohol  Use      Alcohol intake:  None            Substance Use      Substance use:  Denies use            REVIEW OF SYSTEMS      General:  Admits: Weight Loss;          Denies: Fatigue      ENT:  Denies Headache; Admits Hearing Loss      Cardiovascular:  Denies Palpitations      Gastrointestinal:  Admits Nausea/Vomiting; Denies Diarrhea      Musculoskeletal:  Admits Muscle Pain      Other      stomach pain      Neurologic:  Denies Dizziness; Admits Numbness\Tingling            VITAL SIGNS AND SCORES      Vitals      Weight 222 lbs 3.579 oz / 100.8 kg      Temperature 97.4 F / 36.33 C - Temporal      Pulse 73      Respirations 18      Blood Pressure 117/66 Sitting, Right Arm      Pulse Oximetry 96%, rm air            Pain Score      Pain Scale Used:  Numerical      Pain Intensity:  5            Fatigue Score      Fatigue (0-10 scale):  6            EXAM      General Appearance:  Positive for: Alert, Cooperative;          Negative for: Acute Distress      Eye:  Positive for: Anicteric Sclerae, Moist Conjunctiva      Neck:  Positive for: Supple;          Negative for: JVD, Masses      Respiratory:  Positive for: CTAB, Normal Respiratory Effort      Chest:  Port in Place      Abdomen/Gastro:  Positive for: Normal Active Bowel Sounds, Soft;          Negative for: Hepatosplenomegaly, Tenderness      Cardiovascular:  Positive for: RRR;          Negative for: Gallop, Murmur, Peripheral Edema, Rub      Psychiatric:  Positive for: Appropriate Affect, Intact Judgement      Lymphatic:  Negative for: Cervical, Infraclavicular, Supraclavicular            PREVENTION      Hx Influenza Vaccination:  Yes      Date Influenza Vaccine Given:  Nov 1, 2020      2 or More Falls in Past Year?:  No      Fall Past Year with Injury?:  No      Hx Pneumococcal Vaccination:  Yes      Encouraged to follow-up with:  PCP regarding preventative exams.      Chart initiated by      BETH PARISI CMA            ALLERGY/MEDS      Allergies      Coded  Allergies:             NO KNOWN DRUG ALLERGIES (Verified  Allergy, Unknown, 6/1/21)            Medications      Last Reconciled on 6/1/21 16:38 by DEBBIE TAVAREZ      Dexamethasone (Decadron) 4 Mg Tablet      4 MG PO BID for 15 Days, #30 TAB 3 Refills         Prov: DEBBIE TAVAREZ         6/1/21       Ondansetron Hcl (ONDANSETRON HCL) 8 Mg Tablet      8 MG PO Q8H PRN for NAUSEA, #30 TAB 3 Refills         Prov: DEBBIE TAVAREZ         5/11/21       Pravastatin Sodium (Pravastatin Sodium) 80 Mg Tablet      80 MG PO HS for 30 Days, #30 TAB         Reported         4/13/21       Magnesium Oxide (Magnesium Oxide*) 400 Mg Tablet      400 MG PO BID, #60 TAB 0 Refills         Reported         1/13/21       Levothyroxine (Synthroid) 0.025 Mg      0.0125 MG PO QDAY@07, #15 TAB 0 Refills         Reported         8/12/20       Irbesartan (Irbesartan) 300 Mg Tablet      300 MG PO QDAY for 30 Days, #30 TAB         Reported         1/29/20       Methotrexate Sodium (Methotrexate) 2.5 Mg Tab      12.5 MG PO Watson, TAB         Reported         11/6/19       Allopurinol (Allopurinol) 300 Mg Tablet      300 MG PO QDAY, #30 TAB 0 Refills         Reported         6/12/19       Ubidecarenone (Co Q-10) 1 Each Capsule      100 MG PO QDAY, CAP         Reported         5/15/19       Folic Acid (Folic Acid) 1 Mg Tablet      1 MG PO QDAY, #30 TAB 0 Refills         Reported         5/1/19       Aspirin Chew (Aspirin Baby) 81 Mg Tab.chew      81 MG PO QDAY, #30 TAB.CHEW 0 Refills         Reported         2/13/19       Furosemide (Lasix) 40 Mg Tablet      60 MG PO QDAY, #30 TAB 0 Refills         Reported         2/13/19       Metoprolol Tartrate (Metoprolol Tartrate) 25 Mg Tablet      25 MG PO BID, #60 TAB 0 Refills         Reported         2/13/19       prednisoLONE (prednisoLONE) 5 Mg Tab      5 MG PO QDAY, TAB         Reported         2/13/19       Pantoprazole (Protonix) 20 Mg Tablet      40 MG PO BIDAC, #120 TAB 0 Refills         Reported          2/13/19      Medications Reviewed:  No Changes made to meds            IMPRESSION/PLAN      Diagnosis      Malignant neoplasm of lower third of esophagus - C15.5      Status post multiple lines of therapy as above.  Restaging CT scans     unfortunately demonstrates progression of his disease with multiple new lesions     in the lymph nodes, lungs as well as abnormalities of liver-part of this could     be related to recent yttrium treatments.  The other areas in the lungs and lymph    nodes clearly represent progression.  He is also having more dysphagia.  We     discussed palliative chemotherapy with Taxotere 75 mg/m ² given every 3 weeks     until progression or intolerance.  Side effects, risk and benefits discussed.      Written teaching information provided.  Prescription for Decadron provided to     help mitigate fluid retention and nausea.  I will go ahead and refer him to     radiation oncology for consideration of palliative radiation to the esophagus     given his increased dysphagia symptoms.  I will plan his initial cycle in the     near future.  I will see him back 3 weeks from that time for cycle 2, day 1 with    labs prior.            Notes      New Medications      * Dexamethasone (Decadron) 4 MG TABLET: 4 MG PO BID 15 Days #30         Instructions: Take one tablet in the morning and evening on the day prior to        chemo, the day of chemo, and the day after chemo.      New Referrals      * Radiation Therapy, As Soon As Possible         KALINA MORELOS         Dx: Malignant neoplasm of lower third of esophagus - C15.5            Pain      Pain Zero Today            Advanced Care Plan Discussion      Declines Discussion 1124F            Patient Education            Docetaxel Injection      Patient Education Provided:  Yes            Electronically signed by DEBBIE TAVAREZ  06/01/2021 16:38       Disclaimer: Converted document may not contain table formatting or lab diagrams. Please see [employer] for the authenticated document.

## 2021-06-09 NOTE — ADDENDUM NOTE
Encounter addended by: Monserrat Lowry [facility] on: 6/9/2021 3:47 PM   Actions taken: [employer] created or edited

## 2021-06-09 NOTE — PROGRESS NOTES
Outpatient Infusion • [address] • [address] • [place] [id number] • [phone]      CHEMOTHERAPY EDUCATION SHEET    NAME:  Gabriele Connolly      : 1950           DATE: 21    Booklets Given: Chemotherapy and You []  Eating Hints []    Sexuality/Fertility Books []     Chemotherapy/Biotherapy Education Sheets: (list all that apply)  Docetaxel                                                                                                                                                                Chemotherapy Regimen:  Docetaxel every 21 days x 6 cycles    TOPICS EDUCATION PROVIDED EDUCATION REINFORCED COMMENTS   ANEMIA:  role of RBC, cause, s/s, ways to manage, role of transfusion [] []    THROMBOCYTOPENIA:  role of platelet, cause, s/s, ways to prevent bleeding, things to avoid, when to seek help [x] []    NEUTROPENIA:  role of WBC, cause, infection precautions, s/s of infection, when to call MD [x] []    NUTRITION & APPETITE CHANGES:  importance of maintaining healthy diet & weight, ways to manage to improve intake, dietary consult, exercise regimen [] []    DIARRHEA:  causes, s/s of dehydration, ways to manage, dietary changes, when to call MD [x] []    CONSTIPATION:  causes, ways to manage, dietary changes, when to call MD [] []    NAUSEA & VOMITING:  cause, use of antiemetics, dietary changes, when to call MD [x] [] Patient uses Zofran whenever he is nauseous.   MOUTH SORES:  causes, oral care, ways to manage [x] []    ALOPECIA:  cause, ways to manage, resources [] []    INFERTILITY & SEXUALITY:  causes, fertility preservation options, sexuality changes, ways to manage, importance of birth control [] []    NERVOUS SYSTEM CHANGES:  causes, s/s, neuropathies, cognitive changes, ways to manage [x] [] Patient currently has some residual neuropathy from prior treatments. Educated patient to notify us if it worsens. Does not prevent patient from doing anything at this time.    PAIN:   causes, ways to manage [] [] ????   SKIN & NAIL CHANGES:  cause, s/s, ways to manage [x] []    ORGAN TOXICITIES:  cause, s/s, need for diagnostic tests, labs, when to notify MD [] []    SURVIVORSHIP:  distress, distress assessment, secondary malignancies, early/late effects, follow-up, social issues, social support [] []    HOME CARE:  use of spill kits, storing of PO chemo, how to manage bodily fluids [] []    MISCELLANEOUS:  drug interactions, administration, vesicant, et [] []      Referrals:        Notes:   Patient is status post multiple lines of therapy over the past.

## 2021-06-09 NOTE — ADDENDUM NOTE
Encounter addended by: Monserrat Lowry [facility] on: 6/9/2021 3:44 PM   Actions taken: Clinical Note Signed

## 2021-06-16 PROBLEM — I63.9 STROKE (HCC): Status: ACTIVE | Noted: 2021-01-01

## 2021-06-16 PROBLEM — G62.9 PERIPHERAL NEUROPATHY: Status: ACTIVE | Noted: 2021-01-01

## 2021-06-16 PROBLEM — M05.9 SEROPOSITIVE RHEUMATOID ARTHRITIS (HCC): Status: ACTIVE | Noted: 2020-01-01

## 2021-06-16 PROBLEM — I21.9 HEART ATTACK (HCC): Status: ACTIVE | Noted: 2021-01-01

## 2021-06-16 PROBLEM — E78.00 HYPERCHOLESTEROLEMIA: Status: ACTIVE | Noted: 2021-01-01

## 2021-06-16 PROBLEM — M19.90 ARTHRITIS: Status: ACTIVE | Noted: 2021-01-01

## 2021-06-16 PROBLEM — I51.9 HEART DISEASE: Status: ACTIVE | Noted: 2021-01-01

## 2021-06-16 PROBLEM — C15.9 ESOPHAGEAL ADENOCARCINOMA (HCC): Status: ACTIVE | Noted: 2021-01-01

## 2021-06-16 NOTE — PROGRESS NOTES
New Patient Visit       Patient: Gabriele Connolly   YOB: 1950   Medical Record Number: [id number]   Date of Visit  June 16, 2021   Primary Diagnosis: Cancer of lower third of esophagus (CMS/HCC) [C15.5]   Cancer Staging: Cancer Staging  Stage IVB (cT2, cNX, pM1)                                               History of Present Illness:   Gabriele Connolly is a 70-year-old gentleman with stage IV adenocarcinoma of the esophagus who is seen in consultation regarding radiotherapy for palliation of obstructive symptoms.  Mr. Connolly was initially diagnosed with adenocarcinoma of the esophagus in January 2019 at which time pathology from EGD revealed moderately differentiated adenocarcinoma.  PET/CT 2 months prior revealed a 2 x 1 cm area of focal activity within the GE junction with only minimal fullness identified in that region on CT scan.  Celiac lymph nodes were positive for metastatic disease.  He was evaluated to undergo neoadjuvant chemoradiation before surgical resection.  On CT simulation for treatment planning for external beam radiotherapy, a suspicious lesion was found in the liver.  MRI of the liver showed a 9 mm lesion in the inferior right hepatic lobe suspicious for hepatic metastasis.  He was started on palliative FOLFOX and completed 4 cycles.  He is also received Keytruda as well as Cyramza/Taxol.  Most recent CT scan of the chest, abdomen and pelvis on 5/28/2021 revealed progression of pulmonary metastatic disease, progression of hepatic metastatic disease and increased proximal gastric wall thickening and distal esophageal wall thickening likely representing progression of disease.  There was new low-density left infrahilar lymphadenopathy that was likely metastatic disease and new enlarged low-density gastrohepatic lymph nodes that were likely metastatic disease.  The esophageal stent was seen within the stomach.  Mr. Connolly notes an approximate 2-week history of slight difficulty  breathing.  He additionally reports a 1 week history of hiccups.  He is only able to tolerate chicken noodle soup and cannot tolerate anything else by mouth.  He denies chest pain, hemoptysis, hematemesis hematochezia or melena.    This is a new problem to me, and one that is potentially life-threatening.  (Old medical records were requested, reviewed, and summarized in the HPI)    Review of Systems   Constitutional: Positive for fatigue.   HENT: Positive for mouth sores and trouble swallowing.         ESOPHAGITIS   Eyes: Positive for visual disturbance (WEARS GLASSES).   Respiratory: Negative for cough and shortness of breath.    Cardiovascular: Negative for chest pain and palpitations.   Gastrointestinal: Positive for abdominal pain. Negative for constipation, diarrhea and nausea.   Genitourinary: Positive for frequency. Negative for difficulty urinating and urgency.   Musculoskeletal: Positive for arthralgias.   Skin: Negative for color change.   Neurological: Positive for dizziness (FROM PAIN MEDICATION) and weakness (GENERALIZED). Negative for headaches.   Psychiatric/Behavioral: Negative for suicidal ideas.      All other systems reviewed and are negative.        Past Medical History:   Diagnosis Date   • Arthritis    • ASCVD (arteriosclerotic cardiovascular disease)    • Cancer (CMS/HCC)    • Cerebral infarct (CMS/HCC)    • Coronary artery disease    • Gout    • Hypertension    • Migrated esophageal stent 02/2021   • Pneumonia    • ST elevation myocardial infarction (STEMI) (CMS/HCC) 10/25/2017   • Stroke (CMS/HCC)    • TIA (transient ischemic attack)         Past Surgical History:   Procedure Laterality Date   • ANKLE SURGERY Left    • CARDIAC CATHETERIZATION N/A 10/25/2017    Procedure: Left Heart Cath;  Surgeon: Contreras Guillory MD;  Location: [facility] INVASIVE LOCATION;  Service:    • CARDIAC CATHETERIZATION N/A 10/25/2017    Procedure: Stent LUISITO coronary;  Surgeon: Contreras Guillory MD;   Location: [facility] CATH INVASIVE LOCATION;  Service:    • CARDIAC CATHETERIZATION N/A 10/25/2017    Procedure: Left ventriculography;  Surgeon: Contreras Guillory MD;  Location: [facility] CATH INVASIVE LOCATION;  Service:    • CARDIAC CATHETERIZATION N/A 10/25/2017    Procedure: Coronary angiography;  Surgeon: Contreras Guillory MD;  Location: [facility] CATH INVASIVE LOCATION;  Service:    • CARDIAC CATHETERIZATION  10/25/2017    Procedure: Percutaneous Manual Thrombectomy;  Surgeon: Contreras Guillory MD;  Location: [facility] CATH INVASIVE LOCATION;  Service:    • CORONARY ANGIOPLASTY     • CORONARY STENT PLACEMENT     • JOINT REPLACEMENT      knee   • MOLE REMOVAL     • REPLACEMENT TOTAL KNEE BILATERAL        Family History   Problem Relation Age of Onset   • Hypertension Mother    • Cancer Mother    • Hypertension Father    • Arthritis Father    • No Known Problems Maternal Grandmother    • No Known Problems Maternal Grandfather    • No Known Problems Paternal Grandmother    • No Known Problems Paternal Grandfather         Social History     Socioeconomic History   • Marital status:      Spouse name: Not on file   • Number of children: Not on file   • Years of education: Not on file   • Highest education level: Not on file   Tobacco Use   • Smoking status: Former Smoker     Types: Cigarettes     Start date: 1965     Quit date: 2000     Years since quittin.4   • Smokeless tobacco: Former User   • Tobacco comment: CAFFEINE USE-1 CUPS DECAF COFFEE DAILY    Vaping Use   • Vaping Use: Never used   Substance and Sexual Activity   • Alcohol use: No   • Drug use: No   • Sexual activity: Defer          Allergies: Atorvastatin   Prior to Admission medications    Medication Sig Start Date End Date Taking? Authorizing Provider   allopurinol (ZYLOPRIM) 300 MG tablet allopurinol 300 mg oral tablet take 1 tablet (300 mg) by oral route once daily   Active 21  Yes Provider, MD Yuri   aspirin  "(aspirin) 81 MG EC tablet aspirin 81 mg oral tablet,delayed release (DR/EC) take 1 tablet (81 mg) by oral route once daily   Active   Yes ProviderYuri MD   chlorthalidone (HYGROTON) 25 MG tablet TAKE 1/2 TABLET BY MOUTH DAILY 12/11/20  Yes Reyna Gamez APRN   co-enzyme Q-10 30 MG capsule CoQ-10 30 mg oral capsule take 1 capsule by oral route daily   Active   Yes ProviderYuri MD   dexamethasone (DECADRON) 4 MG tablet  6/1/21  Yes ProviderYuri MD   folic acid (FOLVITE) 1 MG tablet Take 1 mg by mouth Daily. 6/11/21  Yes Yuri Lambert MD   furosemide (LASIX) 40 MG tablet Take 1 tablet by mouth Daily. 12/14/17  Yes Contreras Guillory MD   HYDROcodone-acetaminophen (NORCO) 5-325 MG per tablet TK 1 T PO  Q 6 H PRN P 6/13/19  Yes ProviderYuri MD   irbesartan (AVAPRO) 300 MG tablet irbesartan 300 mg oral tablet take 1 tablet (300 mg) by oral route once daily   Active   Yes Provider, MD Yuri   levothyroxine sodium (TIROSINT) 13 MCG capsule levothyroxine 13 mcg oral capsule take 1 capsule (13 mcg) by oral route once daily   Active   Yes ProviderYuri MD   MAGnesium-Oxide 400 (241.3 Mg) MG tablet tablet Take 400 mg by mouth 3 (Three) Times a Day. 5/21/21  Yes Yuri Lambert MD   methotrexate 2.5 MG tablet Take 10 mg by mouth 1 (One) Time Per Week. Patient takes four 2.5 mg tablets once a week on mondays per patient and patient's wife. Written list of medications states \"2.5 mg four per week\"   Yes ProviderYuri MD   metoprolol tartrate (LOPRESSOR) 25 MG tablet TAKE 1 TABLET BY MOUTH EVERY 12 HOURS 4/13/18  Yes Contreras Guillory MD   nitroglycerin (NITROSTAT) 0.4 MG SL tablet Place 1 tablet under the tongue Every 5 (Five) Minutes As Needed for Chest Pain. Take no more than 3 doses in 15 minutes. 9/6/19  Yes Reyna Gamez APRN   oxyCODONE (ROXICODONE) 5 MG immediate release tablet Take 10 mg by mouth Every 6 (Six) Hours As Needed. AS " NEEDED FOR PAIN 4/13/21  Yes Yuri Lambert MD   pantoprazole (PROTONIX) 40 MG EC tablet Take 40 mg by mouth 2 (Two) Times a Day. 7/8/19  Yes Yuri Lambert MD   pravastatin (PRAVACHOL) 80 MG tablet TAKE 1 TABLET BY MOUTH DAILY 12/5/19  Yes Contreras Guillory MD   predniSONE (DELTASONE) 1 MG tablet Take 1 mg by mouth 3 (Three) Times a Day.   Yes Yuri Lambert MD   methotrexate 2.5 MG tablet Take 10 mg by mouth. 6/11/21 6/16/21 Yes Yuri Lambert MD   allopurinol (ZYLOPRIM) 300 MG tablet Take 300 mg by mouth Daily. 8/30/19 6/16/21  Yuri Lambert MD   aspirin 81 MG EC tablet Take 81 mg by mouth Daily.  6/16/21  Yuri Lambert MD   Coenzyme Q10 (COQ10) 100 MG capsule Take 100 mg by mouth Daily.  6/16/21  Yuri Lambert MD   DULoxetine (CYMBALTA) 20 MG capsule Take 20 mg by mouth Daily.  6/16/21  Yuri Lambert MD   FEROSUL 325 (65 Fe) MG tablet Take 1 tablet by mouth 2 (Two) Times a Day. 11/15/18 6/16/21  Yuri Lambert MD   furosemide (LASIX) 40 MG tablet furosemide 40 mg oral tablet take 1 1/2 tablet (40 mg) by oral route once daily   Active  6/16/21  Yrui Lambert MD   glucosamine-chondroitin 500-400 MG capsule capsule Take 1 capsule by mouth 2 (Two) Times a Day With Meals.  6/16/21  Yuri Lambert MD   HYDROcodone-acetaminophen (NORCO) 7.5-325 MG per tablet AS NEEDED FOR PAIN 4/17/21 6/16/21  Yuri Lambert MD   irbesartan (AVAPRO) 300 MG tablet Take 300 mg by mouth Daily. 7/19/20 6/16/21  Yuri Lambert MD   levothyroxine (SYNTHROID, LEVOTHROID) 50 MCG tablet Take 50 mcg by mouth Daily. 7/24/20 6/16/21  Yuri Lambert MD   metoprolol tartrate (LOPRESSOR) 25 MG tablet metoprolol tartrate 25 mg oral tablet take 1 tablet (25 mg) by oral route 2 times per day   Active  6/16/21  Yuri Lambert MD   pantoprazole (PROTONIX) 40 MG EC tablet pantoprazole 40 mg oral tablet,delayed release (DR/EC) take 1 tablet (40  "mg) by oral route 2 times per day   Active  6/16/21  Yuri Lambert MD   pravastatin (PRAVACHOL) 80 MG tablet pravastatin 80 mg oral tablet take 1 tablet (80 mg) by oral route once daily   Active  6/16/21  Yuri Lambert MD   predniSONE (DELTASONE) 5 MG tablet Take 5 mg by mouth Daily. 6/5/21 6/16/21  Yuri Lambert MD   prochlorperazine (COMPAZINE) 10 MG tablet Take 10 mg by mouth Daily. 9/10/20 6/16/21  Yuri Lambert MD   Pyridoxine HCl (Vitamin B6) 100 MG tablet Take 100 mg by mouth 2 (two) times a day.  6/16/21  Yuri Lambert MD      Pain: (on a scale of 0-10)   Pain Score    06/16/21 1306   PainSc:   8       Quality of Life:  70 - Difficulty Walking, Needs Assistance   Advanced Care Plan: N     Physical Examination:  Vitals:     Vitals:    06/16/21 1322   BP: 90/55   Pulse:    Resp:    Temp:    SpO2:        Height: 177.8 cm (70\")  Weight: Weight: 96.2 kg (212 lb)   Body mass index is 30.42 kg/m².      Constitutional: The patient is a well-developed, well-nourished  male in no acute distress but appear fatigued  Alert and oriented ×3.  HEENT: Atraumatic. Normocephalic. No abnormalities noted.  Lymphatics: No cervical, supraclavicular, or axillary, or inguinal lymphadenopathy is palpated.  CV: Regular rate and rhythm.  No murmurs, rubs, or gallops are appreciated.  Respiratory: normal respiratory effort  GI: Abdomen soft, nontender, nondistended,   Extremities: No clubbing, cyanosis; positive for bilateral edema.  Neurologic: no focal neurological deficits noted on exam. Left eyelid lag  Psychiatric: Alert and oriented x3. Normal affect, with no anxiety or depression noted.    Radiographs : I personally reviewed the CT scan of the chest, abdomen and pelvis performed on 5/28/2021.  The pertinent findings are as above in HPI.    Pathology: I personally reviewed the pathology report from the procedure performed on 1/17/2019.  The pertinent findings are as above in HPI.    Labs: "   WBC   Date Value Ref Range Status   06/09/2021 14.13 (H) 3.40 - 10.80 10*3/mm3 Final   11/03/2020 7.73 4.80 - 10.80 10*3/uL Final   10/29/2018 19.19 (H) 4.5 - 11.0 10*3/uL Final     Hemoglobin   Date Value Ref Range Status   06/09/2021 11.8 (L) 13.0 - 17.7 g/dL Final   10/29/2018 11.4 (L) 13.5 - 17.5 g/dL Final     Hematocrit   Date Value Ref Range Status   06/09/2021 40.1 37.5 - 51.0 % Final   10/29/2018 39.0 (L) 41.0 - 53.0 % Final     Platelets   Date Value Ref Range Status   06/09/2021 238 140 - 450 10*3/mm3 Final   10/29/2018 445 (H) 140 - 440 10*3/uL Final         ASSESSMENT/PLAN:   Gabriele Connolly is a 70-year-old gentleman with stage IV adenocarcinoma of the esophagus.  He was initially diagnosed in January 2019 and has been receiving multiple systemic therapy agents for palliation.  He does have progression of disease and dysphagia related to progression of his primary tumor.    I discussed the clinical, radiographic and pathologic findings to date with Mr. Connolly and his wife.  I explained the role of radiotherapy in the palliation of dysphagia/esophageal obstruction in the setting of esophageal cancer.  I recommended retrieval of the stent that is now migrated into the stomach.  Radiotherapy can be delivered to the gastroesophageal junction thereafter.  I did explain that given the chronicity of his dysphagia, I do not expect a remarkable response to external beam radiotherapy as the esophagus in this region will likely have a persistent loss of function due to tumoral replacement even if the tumor is reduced with external beam radiotherapy.  Mr. Connolly would like to pursue external beam radiotherapy understanding the risks and alternatives.  He is scheduled to be evaluated by Dr. Renteria for esophageal stent retrieval tomorrow.  He will undergo CT simulation for treatment planning purposes on 6/21/2021.                  This document has been signed by Reji Espinal MD on June 16, 2021 16:11 EDT

## 2021-06-17 PROBLEM — R13.10 DYSPHAGIA: Status: ACTIVE | Noted: 2021-01-01

## 2021-06-17 PROBLEM — C16.0 ADENOCARCINOMA OF CARDIO-ESOPHAGEAL JUNCTION (HCC): Status: ACTIVE | Noted: 2021-01-01

## 2021-06-17 NOTE — TELEPHONE ENCOUNTER
Distress Screening Follow-Up    Date of Distress Screenin21  Location of Distress Screening: Radiation Oncology  Diagnosis: Stage IV Esophageal Cancer  Distress Level: 6  Problems Indicated: Eating, Fatigue, Indigestion, Mouth Sores, Nausea, Pain    Services/Referrals Provided: Emotional Support, Transportation    Comments: Spoke with pt and spouse via telephone on 21 to f/u in regards to identified distress. Reintroduced myself and discussed oncology SW role/psychosocial services available. Pt on his way to [facility] to have stent removed that is currently in his stomach. Spouse indicates pt hasn't been able to eat in the past 5 days or so due to such. Pt and spouse very grateful Dr. Espinal identified this on pt's recent scan and coordinated with Dr. Renteria in getting this removed this morning. Pt experiencing dysphagia. Pt will meet with RD in rad onc once tx begins. Pt resides in [facility]. Pt has Medicare and supplemental insurance. Pt and spouse expressed interest in gas assistance throughout radiation tx. SW will provide gas assistance as needed/available once radiation therapy tx begins. Discussed opportunity for mental health and/or support services. Pt has good support system, including spouse, son, etc. No home care or other needs identified at this time. Provided my direct phone number, encouraging SW support remains available.

## 2021-06-17 NOTE — ANESTHESIA PREPROCEDURE EVALUATION
Anesthesia Evaluation     Patient summary reviewed and Nursing notes reviewed   NPO Solid Status: > 4 hours  NPO Liquid Status: > 4 hours           Airway   Mallampati: II  TM distance: >3 FB  Neck ROM: limited  No difficulty expected  Dental - normal exam     Pulmonary - normal exam   (+) pneumonia resolved , sleep apnea,   Cardiovascular - normal exam    (+) hypertension, past MI  >12 months, CAD, PVD, hyperlipidemia,       Neuro/Psych  (+) TIA, CVA,     GI/Hepatic/Renal/Endo - negative ROS     ROS Comment: Esophageal CA, migrated stent    Musculoskeletal     Abdominal  - normal exam    Bowel sounds: normal.   Substance History - negative use     OB/GYN negative ob/gyn ROS         Other   arthritis,    history of cancer active      Other Comment: Esophageal CA                  Anesthesia Plan    ASA 4     general   total IV anesthesia  intravenous induction     Anesthetic plan, all risks, benefits, and alternatives have been provided, discussed and informed consent has been obtained with: patient and spouse/significant other.

## 2021-06-17 NOTE — ANESTHESIA POSTPROCEDURE EVALUATION
Patient: Gabriele Connolly    Procedure Summary     Date: 06/17/21 Room / Location: [facility] ENDOSCOPY 3 / [facility] ENDOSCOPY    Anesthesia Start: 1345 Anesthesia Stop: 1417    Procedure: ESOPHAGOGASTRODUODENOSCOPY WITH BIOPSY/ESOPHAGEAL STENT REMOVAL (N/A ) Diagnosis:       Esophageal adenocarcinoma (CMS/HCC)      (Esophageal adenocarcinoma (CMS/HCC) [C15.9])    Surgeons: Nilton Renteria MD Provider: Jesús Deal MD    Anesthesia Type: general ASA Status: 4          Anesthesia Type: general    Vitals  Vitals Value Taken Time   BP 95/60 06/17/21 1430   Temp     Pulse 108 06/17/21 1431   Resp     SpO2 93 % 06/17/21 1431   Vitals shown include unvalidated device data.        Post Anesthesia Care and Evaluation    Patient location during evaluation: bedside  Patient participation: complete - patient participated  Level of consciousness: awake  Pain score: 0  Pain management: adequate  Airway patency: patent  Anesthetic complications: No anesthetic complications  PONV Status: none  Cardiovascular status: acceptable and stable  Respiratory status: acceptable and room air  Hydration status: acceptable

## 2021-06-17 NOTE — H&P
Pre Procedure History & Physical    Chief Complaint:   Dysphagia: Displaced esophageal stent    Subjective     HPI:   Patient has history of esophageal cancer in which she has mets.  He had a esophageal stent placed for last few days he been having trouble with dysphagia recent abdominal x-ray showed that the stent has fallen into the stomach.    Past Medical History:   Past Medical History:   Diagnosis Date   • Arthritis    • ASCVD (arteriosclerotic cardiovascular disease)    • Cancer (CMS/[facility])    • Cerebral infarct (CMS/[facility])    • Coronary artery disease    • Gout    • Hypertension    • Migrated esophageal stent 02/2021   • Pneumonia    • ST elevation myocardial infarction (STEMI) (CMS/[facility]) 10/25/2017   • Stroke (CMS/[facility])    • TIA (transient ischemic attack)        Past Surgical History:  Past Surgical History:   Procedure Laterality Date   • ANKLE SURGERY Left    • CARDIAC CATHETERIZATION N/A 10/25/2017    Procedure: Left Heart Cath;  Surgeon: Contreras Guillory MD;  Location: [facility] CATH INVASIVE LOCATION;  Service:    • CARDIAC CATHETERIZATION N/A 10/25/2017    Procedure: Stent LUISITO coronary;  Surgeon: Contreras Guillory MD;  Location: [facility] CATH INVASIVE LOCATION;  Service:    • CARDIAC CATHETERIZATION N/A 10/25/2017    Procedure: Left ventriculography;  Surgeon: Contreras Guillory MD;  Location: [facility] CATH INVASIVE LOCATION;  Service:    • CARDIAC CATHETERIZATION N/A 10/25/2017    Procedure: Coronary angiography;  Surgeon: Contreras Guillory MD;  Location: [facility] CATH INVASIVE LOCATION;  Service:    • CARDIAC CATHETERIZATION  10/25/2017    Procedure: Percutaneous Manual Thrombectomy;  Surgeon: Contreras Guillory MD;  Location: [facility] CATH INVASIVE LOCATION;  Service:    • CORONARY ANGIOPLASTY     • CORONARY STENT PLACEMENT     • JOINT REPLACEMENT      knee   • MOLE REMOVAL     • REPLACEMENT TOTAL KNEE BILATERAL         Family History:  Family History   Problem Relation Age of Onset   •  Hypertension Mother    • Cancer Mother    • Hypertension Father    • Arthritis Father    • No Known Problems Maternal Grandmother    • No Known Problems Maternal Grandfather    • No Known Problems Paternal Grandmother    • No Known Problems Paternal Grandfather        Social History:   reports that he quit smoking about 21 years ago. His smoking use included cigarettes. He started smoking about 56 years ago. He has quit using smokeless tobacco. He reports that he does not drink alcohol and does not use drugs.    Medications:   Medications Prior to Admission   Medication Sig Dispense Refill Last Dose   • allopurinol (ZYLOPRIM) 300 MG tablet allopurinol 300 mg oral tablet take 1 tablet (300 mg) by oral route once daily   Active   6/16/2021 at Unknown time   • aspirin (aspirin) 81 MG EC tablet aspirin 81 mg oral tablet,delayed release (DR/EC) take 1 tablet (81 mg) by oral route once daily   Active   6/16/2021 at Unknown time   • chlorthalidone (HYGROTON) 25 MG tablet TAKE 1/2 TABLET BY MOUTH DAILY 45 tablet 3 6/16/2021 at Unknown time   • co-enzyme Q-10 30 MG capsule CoQ-10 30 mg oral capsule take 1 capsule by oral route daily   Active   6/16/2021 at Unknown time   • dexamethasone (DECADRON) 4 MG tablet    6/16/2021 at Unknown time   • folic acid (FOLVITE) 1 MG tablet Take 1 mg by mouth Daily.   6/16/2021 at Unknown time   • furosemide (LASIX) 40 MG tablet Take 1 tablet by mouth Daily. 90 tablet 1 6/16/2021 at Unknown time   • HYDROcodone-acetaminophen (NORCO) 5-325 MG per tablet TK 1 T PO  Q 6 H PRN P  0 6/16/2021 at Unknown time   • irbesartan (AVAPRO) 300 MG tablet irbesartan 300 mg oral tablet take 1 tablet (300 mg) by oral route once daily   Active   6/16/2021 at Unknown time   • levothyroxine sodium (TIROSINT) 13 MCG capsule levothyroxine 13 mcg oral capsule take 1 capsule (13 mcg) by oral route once daily   Active   6/16/2021 at Unknown time   • MAGnesium-Oxide 400 (241.3 Mg) MG tablet tablet Take 400 mg by  "mouth 3 (Three) Times a Day.   6/16/2021 at Unknown time   • methotrexate 2.5 MG tablet Take 10 mg by mouth 1 (One) Time Per Week. Patient takes four 2.5 mg tablets once a week on mondays per patient and patient's wife. Written list of medications states \"2.5 mg four per week\"   6/16/2021 at Unknown time   • metoprolol tartrate (LOPRESSOR) 25 MG tablet TAKE 1 TABLET BY MOUTH EVERY 12 HOURS 60 tablet 0 6/16/2021 at Unknown time   • nitroglycerin (NITROSTAT) 0.4 MG SL tablet Place 1 tablet under the tongue Every 5 (Five) Minutes As Needed for Chest Pain. Take no more than 3 doses in 15 minutes. 30 tablet 5 6/16/2021 at Unknown time   • oxyCODONE (ROXICODONE) 5 MG immediate release tablet Take 10 mg by mouth Every 6 (Six) Hours As Needed. AS NEEDED FOR PAIN   6/16/2021 at Unknown time   • pantoprazole (PROTONIX) 40 MG EC tablet Take 40 mg by mouth 2 (Two) Times a Day.  11 6/16/2021 at Unknown time   • pravastatin (PRAVACHOL) 80 MG tablet TAKE 1 TABLET BY MOUTH DAILY 90 tablet 0 6/16/2021 at Unknown time   • predniSONE (DELTASONE) 1 MG tablet Take 1 mg by mouth 3 (Three) Times a Day.   6/16/2021 at Unknown time       Allergies:  Atorvastatin        Objective     Blood pressure 94/58, pulse 98, temperature 97.9 °F (36.6 °C), temperature source Temporal, resp. rate 18, weight 94.8 kg (208 lb 15.9 oz), SpO2 94 %.    Physical Exam   Constitutional: Pt is oriented to person, place, and time and well-developed, well-nourished, and in no distress.   Mouth/Throat: Oropharynx is clear and moist.   Neck: Normal range of motion.   Cardiovascular: Normal rate, regular rhythm and normal heart sounds.    Pulmonary/Chest: Effort normal and breath sounds normal.   Abdominal: Soft. Nontender  Skin: Skin is warm and dry.   Psychiatric: Mood, memory, affect and judgment normal.     Assessment/Plan     Diagnosis:  Dysphagia, esophageal cancer, displaced esophageal stent    Anticipated Surgical Procedure:  EGD    The risks, benefits, and " alternatives of this procedure have been discussed with the patient or the responsible party- the patient understands and agrees to proceed.

## 2021-06-17 NOTE — ANESTHESIA POSTPROCEDURE EVALUATION
Patient: Gabriele Connolly    Procedure Summary     Date: 06/17/21 Room / Location: [facility] ENDOSCOPY 3 / [facility] ENDOSCOPY    Anesthesia Start: 1345 Anesthesia Stop: 1417    Procedure: ESOPHAGOGASTRODUODENOSCOPY WITH BIOPSY/ESOPHAGEAL STENT REMOVAL (N/A ) Diagnosis:       Esophageal adenocarcinoma (CMS/HCC)      (Esophageal adenocarcinoma (CMS/HCC) [C15.9])    Surgeons: Nilton Renteria MD Provider: Jesús Deal MD    Anesthesia Type: general ASA Status: 4          Anesthesia Type: general    Vitals  Vitals Value Taken Time   BP 95/60 06/17/21 1430   Temp     Pulse 101 06/17/21 1430   Resp     SpO2 92 % 06/17/21 1430   Vitals shown include unvalidated device data.        Post Anesthesia Care and Evaluation    Patient location during evaluation: bedside  Patient participation: complete - patient participated  Level of consciousness: awake  Pain score: 0  Pain management: adequate  Airway patency: patent  Anesthetic complications: No anesthetic complications  PONV Status: none  Cardiovascular status: acceptable and stable  Respiratory status: acceptable and room air  Hydration status: acceptable

## 2021-06-21 NOTE — PROGRESS NOTES
Outpatient Nutrition Oncology Follow Up    Patient Name: Gabriele Connolly  YOB: 1950  MRN: [id number]  Assessment Date: 6/21/2021    CLINICAL NUTRITION ASSESSMENT      Type of Cancer Treatment  Docetaxel, XRT to esophagus       CLINICAL NUTRITION ASSESSMENT      Reason for Assessment  Nurse practioner/physician assistant consult     H&P:    Past Medical History:   Diagnosis Date   • Arthritis    • ASCVD (arteriosclerotic cardiovascular disease)    • Cancer (CMS/HCC)    • Cerebral infarct (CMS/[facility])    • Coronary artery disease    • Gout    • Hypertension    • Migrated esophageal stent 02/2021   • Pneumonia    • ST elevation myocardial infarction (STEMI) (CMS/HCC) 10/25/2017   • Stroke (CMS/[facility])    • TIA (transient ischemic attack)         Current Problems:   Patient Active Problem List   Diagnosis Code   • Coronary artery disease involving native coronary artery of native heart without angina pectoris I25.10   • History of coronary artery stent placement Z95.5   • Claudication (CMS/[facility]) I73.9   • Mixed hyperlipidemia E78.2   • Hypertension I10   • Malignant neoplasm of lower third of esophagus (CMS/HCC) C15.5   • Arthritis M19.90   • Peripheral neuropathy G62.9   • Seropositive rheumatoid arthritis (CMS/[facility]) M05.9   • Stroke (CMS/[facility]) I63.9   • Heart disease I51.9   • Hypercholesterolemia E78.00   • Heart attack (CMS/[facility]) I21.9   • Esophageal adenocarcinoma (CMS/HCC) C15.9   • 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    Weight Hx  Wt Readings from Last 30 Encounters:   06/17/21 1105 94.8 kg (208 lb 15.9 oz)   06/16/21 1306 96.2 kg (212 lb)   06/09/21 0841 99.9 kg (220 lb 3.8 oz)   06/01/21 1347 101 kg (222 lb 3.6 oz)   05/11/21 0923 103 kg (227 lb 1.2 oz)   04/12/21 0000 108 kg (238 lb 4 oz)   01/07/21 1436 115 kg (253 lb 8.5 oz)   12/09/20 1115 110 kg (242 lb 8.1 oz)   11/03/20 1043  117 kg (257 lb 15 oz)   09/30/20 0919 117 kg (257 lb 8 oz)   09/17/20 0953 115 kg (253 lb 6.4 oz)   09/16/20 1001 114 kg (250 lb 14.1 oz)   08/26/20 0858 117 kg (258 lb 6.1 oz)   06/17/20 0915 118 kg (259 lb 0.7 oz)   06/03/20 0924 118 kg (260 lb 5.8 oz)   05/06/20 0925 117 kg (257 lb 15 oz)   04/08/20 0921 119 kg (262 lb 9.1 oz)   03/18/20 0919 119 kg (263 lb 3.7 oz)   02/19/20 0938 119 kg (261 lb 7.5 oz)   01/22/20 1005 118 kg (260 lb 12.9 oz)   11/27/19 0915 113 kg (249 lb 12.5 oz)   11/06/19 0947 115 kg (254 lb 3.1 oz)   10/02/19 0907 111 kg (244 lb 7.8 oz)   09/06/19 1318 114 kg (252 lb)   09/04/19 0855 114 kg (252 lb 3.3 oz)   08/14/19 1108 114 kg (251 lb 8.7 oz)   07/31/19 0837 115 kg (252 lb 13.9 oz)   07/03/19 0912 115 kg (253 lb 4.9 oz)   06/12/19 0955 116 kg (255 lb 8.2 oz)   05/15/19 1440 117 kg (258 lb 13.1 oz)        Labs/Medications        Pertinent Labs Reviewed.         Invalid input(s): TAMARA PROT      Coronavirus (COVID-19)   Date Value Ref Range Status   01/11/2021 NOT DETECTED NA Final     Comment:     The SARS-CoV-2 assay is a real-time, RT-PCR test intended  for the qualitative detection of nucleic acid from the  SARS-CoV-2 in respiratory specimens from individuals,  testing performed at [facility] reference lab.       Lab Results   Component Value Date    HGBA1C 5.11 10/26/2017         Pertinent Medications HYDROcodone-acetaminophen, allopurinol, aspirin, chlorthalidone, co-enzyme Q-10, dexamethasone, fluconazole, folic acid, furosemide, irbesartan, levothyroxine sodium, magnesium oxide, methotrexate, metoprolol tartrate, nitroglycerin, oxyCODONE, pantoprazole, pravastatin, and predniSONE     Physical Findings            Current Nutrition Orders & Evaluation of Intake       Oral Nutrition     Current PO Diet Liquids:  Water, green tea, milkshakes, oral nutrition supplements    Supplement      Enteral Nutrition     Current Formula    Schedule      Parenteral Nutrition     Current  Prescription    Schedule      Nutrition Diagnosis        Nutrition Dx Problem 1 Unintentional weight loss related to Inability to consume sufficient energy as evidenced by physiological causes increasing nutrient needs. and physiological causes resulting in diminished intake       Nutrition Intervention       RD Action See comment below     Monitor/Evaluation       Monitor Per oncology nutrition protocol.     Comments:  Referral received 6/16/21.  Pt with esophageal Ca dx (metastasis to liver) since 2019.  He has received chemotherapies (Taxol, FOLFOX, Keytruda) in the past.  Now receiving Docetaxel.  Pt had maintained his wt during tx fairly well during tx (see hx of wt changes above).  Since January 2021, pt has lost 18% body wt/6 months.  He has lost 8% in just 3 months.  S/p EGD with bx, esophageal stent removal (dx fungal esophagitis) 6/17/21.  Called to speak with pt today over the phone, however wife answered and stated he is receiving his radiation tx at this time.  Wife explained pt is only able to tolerate liquids at this time, mainly water, green tea, milkshakes, and some oral nutrition supplements.  He was able to consume some oatmeal over the weekend, but it was made very thin.  Wife reports there have been no discussion of a feeding tube at this time.  Discussed (briefly) the best ways to maximize pt's nutritional intake through liquids with a variety of ideas:  Making oral nutrition supplements as a milkshake, adding peanut butter, adding packets of [place] Instant Breakfast or powdered milk.    Estimated daily nutritional needs:  3792 kcals (40 kcals/kg ABW); 190 gm protein (2 gm pro/kg ABW); 2844 ml fluid (30 ml/kg ABW)  Pt will be seen in radiation tx later this week by oncology RD.  For maximizing PO intake, pt will benefit from drinking [facility] oral nutrition supplements (530 kcals/22 gm protein per 8 oz.) or a very high kcal/high protein homemade milkshake.  Pt would benefit from drinking  at least 6 per day, in addition to water/fluids (50-55 oz.) daily.  Will provide pt/wife with homemade high kcal/high protein milkshake ideas and how to purchase the [employer] VHC oral nutrition supplement in radiation tx this week.      Electronically signed by:  Neris Dorantes RD  06/21/21 13:34 EDT

## 2021-06-22 NOTE — TELEPHONE ENCOUNTER
Pt beginning radiation therapy treatments today. Requested staff to provide pt with a $20 gas card today as previously discussed with pt and spouse. SW support remains available.

## 2021-06-23 NOTE — PROGRESS NOTES
Outpatient Nutrition Oncology Follow Up    Patient Name: Gabriele Connolly  YOB: 1950  MRN: [id number]  Assessment Date: 6/23/2021    CLINICAL NUTRITION ASSESSMENT      Type of Cancer Treatment  XRT to esophagus       CLINICAL NUTRITION ASSESSMENT      Reason for Assessment  Follow-up protocol     H&P:    Past Medical History:   Diagnosis Date   • Arthritis    • ASCVD (arteriosclerotic cardiovascular disease)    • Cancer (CMS/HCC)    • Cerebral infarct (CMS/HCC)    • Coronary artery disease    • Gout    • Hypertension    • Migrated esophageal stent 02/2021   • Pneumonia    • ST elevation myocardial infarction (STEMI) (CMS/HCC) 10/25/2017   • Stroke (CMS/HCC)    • TIA (transient ischemic attack)         Current Problems:   Patient Active Problem List   Diagnosis Code   • Coronary artery disease involving native coronary artery of native heart without angina pectoris I25.10   • History of coronary artery stent placement Z95.5   • Claudication (CMS/HCC) I73.9   • Mixed hyperlipidemia E78.2   • Hypertension I10   • Malignant neoplasm of lower third of esophagus (CMS/HCC) C15.5   • Arthritis M19.90   • Peripheral neuropathy G62.9   • Seropositive rheumatoid arthritis (CMS/HCC) M05.9   • Stroke (CMS/HCC) I63.9   • Heart disease I51.9   • Hypercholesterolemia E78.00   • Heart attack (CMS/HCC) I21.9   • Esophageal adenocarcinoma (CMS/HCC) C15.9   • 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    Weight Hx  Wt Readings from Last 30 Encounters:   06/17/21 1105 94.8 kg (208 lb 15.9 oz)   06/16/21 1306 96.2 kg (212 lb)   06/09/21 0841 99.9 kg (220 lb 3.8 oz)   06/01/21 1347 101 kg (222 lb 3.6 oz)   05/11/21 0923 103 kg (227 lb 1.2 oz)   04/12/21 0000 108 kg (238 lb 4 oz)   01/07/21 1436 115 kg (253 lb 8.5 oz)   12/09/20 1115 110 kg (242 lb 8.1 oz)   11/03/20 1043 117 kg (257 lb 15 oz)   09/30/20 0919  117 kg (257 lb 8 oz)   09/17/20 0953 115 kg (253 lb 6.4 oz)   09/16/20 1001 114 kg (250 lb 14.1 oz)   08/26/20 0858 117 kg (258 lb 6.1 oz)   06/17/20 0915 118 kg (259 lb 0.7 oz)   06/03/20 0924 118 kg (260 lb 5.8 oz)   05/06/20 0925 117 kg (257 lb 15 oz)   04/08/20 0921 119 kg (262 lb 9.1 oz)   03/18/20 0919 119 kg (263 lb 3.7 oz)   02/19/20 0938 119 kg (261 lb 7.5 oz)   01/22/20 1005 118 kg (260 lb 12.9 oz)   11/27/19 0915 113 kg (249 lb 12.5 oz)   11/06/19 0947 115 kg (254 lb 3.1 oz)   10/02/19 0907 111 kg (244 lb 7.8 oz)   09/06/19 1318 114 kg (252 lb)   09/04/19 0855 114 kg (252 lb 3.3 oz)   08/14/19 1108 114 kg (251 lb 8.7 oz)   07/31/19 0837 115 kg (252 lb 13.9 oz)   07/03/19 0912 115 kg (253 lb 4.9 oz)   06/12/19 0955 116 kg (255 lb 8.2 oz)   05/15/19 1440 117 kg (258 lb 13.1 oz)        Labs/Medications        Pertinent Labs Reviewed.         Invalid input(s): TAMARA PROT      Coronavirus (COVID-19)   Date Value Ref Range Status   01/11/2021 NOT DETECTED NA Final     Comment:     The SARS-CoV-2 assay is a real-time, RT-PCR test intended  for the qualitative detection of nucleic acid from the  SARS-CoV-2 in respiratory specimens from individuals,  testing performed at [facility] reference lab.       Lab Results   Component Value Date    HGBA1C 5.11 10/26/2017         Pertinent Medications DULoxetine, HYDROcodone-acetaminophen, allopurinol, aspirin, chlorthalidone, co-enzyme Q-10, dexamethasone, fluconazole, folic acid, furosemide, irbesartan, levothyroxine sodium, magnesium oxide, methotrexate, metoprolol tartrate, nitroglycerin, oxyCODONE, pantoprazole, pravastatin, and predniSONE     Physical Findings       Malnutrition Severity Assessment     Row Name 06/23/21 1015          Malnutrition Severity Assessment    Malnutrition Type  Chronic Disease - Related Malnutrition        Insufficient Energy Intake     Insufficient Energy Intake   <75% of est. energy requirement for > or equal to 1 month         Muscle Loss    Loss of Muscle Mass Findings  Moderate     [place]  Moderate - slight depression        Fat Loss    Subcutaneous Fat Loss Findings  Moderate     Upper Arm Region  Moderate - some fat tissue, not ample            Current Nutrition Orders & Evaluation of Intake       Oral Nutrition     Current PO Diet Foods of liquid consistency, some very soft/moist foods   Supplement Ensure or Boost 2-3 times/day     Enteral Nutrition     Current Formula    Schedule      Parenteral Nutrition     Current Prescription    Schedule      Nutrition Diagnosis        Nutrition Dx Problem 1 Moderate malnutrition related to Inability to consume sufficient energy as evidenced by physiological causes increasing nutrient needs., hypermetabolic state., muscle wasting., fat loss. and inadequate energy intake.       Nutrition Intervention       RD Action Nutrition encounter     Monitor/Evaluation       Monitor Per oncology nutrition protocol.     Comments:  Pt seen in radiation tx today.  Please refer to 6/21/21 documentation.  Wife reports pt not eating as he had been previously (as discussed 6/21/21), which was minimal, due to starting on Cymbalta 2 days ago and now very drowsy.  Pt/wife discussed this concern with the radiation oncologist.  Provided wife with recipes for high kcal/high protein milkshakes and information on and how to purchase [employer] VHC oral nutrition supplements (530 kcals/22 gm protein/8 oz.).  It is recommended pt consume 6 homemade milkshakes daily or 6 [employer] VHC daily, in addition to 50-55 oz of fluids.   Today's wt:  95.1 kg  5% wt decline in 3 weeks; 9% decline over 3 months.   Estimated daily nutritional needs:  3792 kcals (40 kcals/kg ABW); 190 gm protein (2 gm pro/kg ABW); 2844 ml fluid (30 ml/kg ABW)  Pt is receiving XRT for palliative purposes.  Note pt with physical findings of moderate malnutrition, significant wt decline has occurred, and only able to consume minimal PO at this time.  *If  appropriate, oncology RD can make further suggestions for nutrition support if needed (such as J-tube feedings).          Electronically signed by:  Neris Dorantes RD  06/23/21 10:33 EDT

## 2021-06-24 NOTE — PROGRESS NOTES
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  He is receiving radiation therapy to the esophagus.  He  has received 900 cGy in 3 fractions out of a planned dose of 3000 cGy in 10 fractions.                                           Review of Systems:   Review of Systems   Constitutional: Positive for fatigue.   HENT: Positive for trouble swallowing.    Respiratory: Positive for cough and shortness of breath.    Cardiovascular: Positive for leg swelling.   Gastrointestinal: Positive for diarrhea and nausea. Negative for constipation.   Genitourinary: Negative for dysuria, frequency and urgency.   Musculoskeletal: Negative.    Neurological: Positive for dizziness (with position changes). Negative for headaches.       Vitals:     Vitals:    06/24/21 1042   BP: 118/66   Pulse: 76   Resp: 16   Temp: 97.6 °F (36.4 °C)   SpO2: 99%       Weight:   Wt Readings from Last 3 Encounters:   06/24/21 95.3 kg (210 lb 1.6 oz)   06/23/21 95.1 kg (209 lb 10.5 oz)   06/17/21 94.8 kg (208 lb 15.9 oz)      Pain:    Pain Score    06/24/21 1042   PainSc: 0-No pain         Physical Exam:  Physical Exam  Musculoskeletal:      Right lower leg: Edema present.      Left lower leg: Edema present.      Comments: Unilateral left upper extremity edema   Skin:     Findings: Bruising present.           Plan: I have reviewed treatment setup notes, checked and approved the daily guidance images.  I reviewed dose delivery, treatment parameters and deemed them appropriate. We plan to continue radiation therapy as prescribed.  Patient has diffuse edema consistent with malnutrition and hypoalbuminemia.  He also has unilateral left upper extremity edema and a  left-sided infusion port.  We have requested a vascular  study of the left upper extremity to rule out DVT.  We also  discontinued the patient's Cymbalta yesterday, as he was complaining of hallucinations after 2 doses.      Hilaria Mathias MD  Radiation Oncology   Electronically signed 6/24/2021  12:00 EDT

## 2021-06-28 NOTE — TELEPHONE ENCOUNTER
DIANA attempted to meet with pt and spouse in rad onc during tx today, however, missed pt during this time. Provided staff with $20 gas card to provide to pt after tx tomorrow, along with my business card. DIANA support remains available.

## 2021-07-06 NOTE — PROGRESS NOTES
On Treatment Visit       Patient: Gabriele Connolly   YOB: 1950   Medical Record Number: [id number]     Date of Visit  July 6, 2021   Primary Diagnosis:No primary diagnosis found.  Cancer Staging: Cancer Staging  Stage IVB (cT2, cNX, pM1)       was seen today for an on treatment visit.  He is receiving radiation therapy to the esophagus.  He  has received 3000 cGy in 10 fractions out of a planned dose of 3000 cGy in 10 fractions.    He has marked improvement in swallowing and is now eating foods of essentially all consistencies.                                        Review of Systems:   Review of Systems   Constitutional: Positive for fatigue. Negative for appetite change.   HENT: Negative for trouble swallowing (swallowing solid food).    Respiratory: Negative for cough and shortness of breath.    Cardiovascular: Positive for leg swelling (decreased from previously).   Gastrointestinal: Positive for diarrhea (occassional). Negative for abdominal pain, constipation and nausea.   Genitourinary: Positive for difficulty urinating (slow stream). Negative for dysuria, frequency and urgency.   Musculoskeletal: Negative.         Generalized weakness   Neurological: Positive for dizziness (with position changes). Negative for headaches.       Vitals:     Vitals:    07/06/21 0933   BP: 95/70   Pulse: 103   Resp: 16   Temp: 97.6 °F (36.4 °C)   SpO2: 97%       Weight:   Wt Readings from Last 3 Encounters:   07/06/21 90.2 kg (198 lb 13.7 oz)   06/30/21 93.9 kg (207 lb 0.2 oz)   06/29/21 94.8 kg (208 lb 15.9 oz)      Pain:    There were no vitals filed for this visit.      Physical Exam:  Physical Exam  Musculoskeletal:      Right lower leg: Edema present.      Left lower leg: Edema present.      Comments: Unilateral left upper extremity edema   Skin:     Findings: Bruising present.           Plan: I have reviewed treatment setup notes, checked and approved the daily guidance images.  I reviewed dose  delivery, treatment parameters and deemed them appropriate. Follow up in one month. Will follow up with Dr. Menchaca next week.    Radiation Oncology   Electronically signed 7/6/2021  09:38 EDT

## 2021-07-15 NOTE — ASSESSMENT & PLAN NOTE
Metastatic.  Patient is status post multiple lines of therapy and is now on single agent docetaxel.  He has completed palliative radiation to the lower esophagus for obstructive symptoms.  He does note that those are doing better and he is having an easier time with eating and drinking.  He is due for his second cycle of docetaxel.  Lab work today is adequate for treatment.  Proceed with cycle two as planned.  RTC 3 weeks for OV, consideration of cycle three with labs and restaging scans prior.

## 2021-07-15 NOTE — PROGRESS NOTES
Chief Complaint  Esophageal Cancer, Follow-up, and Chemotherapy    Anders Hudson, Anders Whipple, YADI Suazo V Mohsen presents to [facility] for ongoing treatment of his metastatic/recurrent esophageal cancer.  He status post multiple lines of therapy.  He is on single agent Taxotere.  He is due for cycle two.  Since his last visit, he had increased problems with his esophageal stent.  It migrated into the stomach and was causing a lot of pain.  He has since been removed.  He has completed a course of palliative radiation to the esophagus afterward.  He states that he is swallowing a little bit better that she is able to eat soft foods and liquids are going down better.  His weight had dropped some but it is now stabilizing.  His energy level is still low but adequate for his ADLs.  He denies unusual aches or pains.    Oncology/Hematology History   Malignant neoplasm of lower third of esophagus (CMS/HCC)   1/17/2019 Initial Diagnosis    Malignant neoplasm of lower third of esophagus (CMS/HCC)     6/1/2021 Cancer Staged    Staging form: Esophagus - Adenocarcinoma, AJCC 8th Edition  - Clinical: Stage IVB (cT2, cNX, pM1) - Signed by Valentin Menchaca MD on 6/1/2021 6/9/2021 -  Chemotherapy    OP CENTRAL VENOUS ACCESS DEVICE ACCESS, CARE, AND MAINTENANCE (CVAD)     6/9/2021 -  Chemotherapy    OP esophagus DOCEtaxel     6/22/2021 -  Radiation    RADIATION THERAPY Treatment Details (Noted on 6/21/2021)  Site: Bilateral Esophagus - Lower  Technique: 3D CRT  Goal: No goal specified  Planned Treatment Start Date: 6/22/2021         Review of Systems   Constitutional: Positive for unexpected weight loss. Negative for appetite change, diaphoresis, fatigue, fever and unexpected weight gain.   HENT: Positive for hearing loss. Negative for mouth sores, sore throat, swollen glands, trouble swallowing and voice change.    Eyes: Negative for blurred vision.    Respiratory: Negative for cough, shortness of breath and wheezing.    Cardiovascular: Negative for chest pain and palpitations.   Gastrointestinal: Negative for abdominal pain, blood in stool, constipation, diarrhea, nausea and vomiting.   Endocrine: Negative for cold intolerance and heat intolerance.   Genitourinary: Negative for difficulty urinating, dysuria, frequency, hematuria and urinary incontinence.   Musculoskeletal: Negative for arthralgias, back pain and myalgias.   Skin: Negative for rash, skin lesions and bruise.   Neurological: Negative for dizziness, seizures, weakness, numbness and headache.   Hematological: Bruises/bleeds easily.   Psychiatric/Behavioral: Negative for depressed mood. The patient is not nervous/anxious.      Current Outpatient Medications on File Prior to Visit   Medication Sig Dispense Refill   • allopurinol (ZYLOPRIM) 300 MG tablet allopurinol 300 mg oral tablet take 1 tablet (300 mg) by oral route once daily   Active     • aspirin (aspirin) 81 MG EC tablet aspirin 81 mg oral tablet,delayed release (DR/EC) take 1 tablet (81 mg) by oral route once daily   Active     • chlorthalidone (HYGROTON) 25 MG tablet TAKE 1/2 TABLET BY MOUTH DAILY 45 tablet 3   • Cholecalciferol (Vitamin D) 10 MCG/ML liquid Take  by mouth.     • co-enzyme Q-10 30 MG capsule CoQ-10 30 mg oral capsule take 1 capsule by oral route daily   Active     • dexamethasone (DECADRON) 4 MG tablet      • DULoxetine (Cymbalta) 60 MG capsule Take 1 capsule by mouth Daily. 30 capsule 2   • folic acid (FOLVITE) 1 MG tablet Take 1 mg by mouth Daily.     • furosemide (LASIX) 40 MG tablet Take 1 tablet by mouth Daily. 90 tablet 1   • Glucosamine-Chondroitin 500-250 MG capsule Glucosamine Chondroitin 550-30-1 mg oral capsule take 2 capsules by oral route daily   Active     • HYDROcodone-acetaminophen (NORCO) 5-325 MG per tablet TK 1 T PO  Q 6 H PRN P  0   • irbesartan (AVAPRO) 300 MG tablet irbesartan 300 mg oral tablet take 1  "tablet (300 mg) by oral route once daily   Active     • levothyroxine sodium (TIROSINT) 13 MCG capsule levothyroxine 13 mcg oral capsule take 1 capsule (13 mcg) by oral route once daily   Active     • Mag Oxide-Vit D3-Turmeric 276-7625-548 MG-UNIT-MG tablet mag oxide-D3-turmeric rt xt 500-3,000-150 mg-unit-mg oral tablet take 1 tablet by oral route daily   Active     • methotrexate 2.5 MG tablet Take 10 mg by mouth 1 (One) Time Per Week. Patient takes four 2.5 mg tablets once a week on mondays per patient and patient's wife. Written list of medications states \"2.5 mg four per week\"     • metoprolol tartrate (LOPRESSOR) 25 MG tablet TAKE 1 TABLET BY MOUTH EVERY 12 HOURS (Patient taking differently: 12.5 mg 2 (Two) Times a Day.) 60 tablet 0   • nitroglycerin (NITROSTAT) 0.4 MG SL tablet Place 1 tablet under the tongue Every 5 (Five) Minutes As Needed for Chest Pain. Take no more than 3 doses in 15 minutes. 30 tablet 5   • ondansetron (ZOFRAN) 8 MG tablet Take 8 mg by mouth Every 8 (Eight) Hours As Needed. for nausea     • oxyCODONE (ROXICODONE) 5 MG immediate release tablet Take 10 mg by mouth Every 6 (Six) Hours As Needed. AS NEEDED FOR PAIN     • pantoprazole (PROTONIX) 40 MG EC tablet Take 40 mg by mouth 2 (Two) Times a Day.  11   • pravastatin (PRAVACHOL) 80 MG tablet TAKE 1 TABLET BY MOUTH DAILY 90 tablet 0   • predniSONE (DELTASONE) 1 MG tablet Take 1 mg by mouth 3 (Three) Times a Day.     • Psyllium (Fiber) 28.3 % powder fiber oral powder use as directed by oral route daily   Active     • MAGnesium-Oxide 400 (241.3 Mg) MG tablet tablet Take 400 mg by mouth 3 (Three) Times a Day.       Current Facility-Administered Medications on File Prior to Visit   Medication Dose Route Frequency Provider Last Rate Last Admin   • [COMPLETED] DOCEtaxel (TAXOTERE) 165 mg in sodium chloride 0.9 % 291.5 mL chemo IVPB  75 mg/m2 (Treatment Plan Recorded) Intravenous Once Valentin Menchaca MD   Stopped at 07/15/21 1211   • heparin " injection 500 Units  500 Units Intravenous PRN Valentin Menchaca MD   500 Units at 07/15/21 1222   • sodium chloride 0.9 % flush 20 mL  20 mL Intravenous PRN Valentin Menchaca MD   20 mL at 07/15/21 1222   • [COMPLETED] sodium chloride 0.9 % infusion 250 mL  250 mL Intravenous Once Valentin Menchaca MD   Stopped at 07/15/21 1221       Allergies   Allergen Reactions   • Atorvastatin Myalgia     Past Medical History:   Diagnosis Date   • Arthritis    • ASCVD (arteriosclerotic cardiovascular disease)    • Cancer (CMS/[facility])    • Cerebral infarct (CMS/[facility])    • Coronary artery disease    • Gout    • Hypertension    • Migrated esophageal stent 02/2021   • Pneumonia    • ST elevation myocardial infarction (STEMI) (CMS/[facility]) 10/25/2017   • Stroke (CMS/HCC)    • TIA (transient ischemic attack)      Past Surgical History:   Procedure Laterality Date   • ANKLE SURGERY Left    • CARDIAC CATHETERIZATION N/A 10/25/2017    Procedure: Left Heart Cath;  Surgeon: Contreras Guillory MD;  Location: [facility] INVASIVE LOCATION;  Service:    • CARDIAC CATHETERIZATION N/A 10/25/2017    Procedure: Stent LUISITO coronary;  Surgeon: Contreras Guillory MD;  Location: [facility] INVASIVE LOCATION;  Service:    • CARDIAC CATHETERIZATION N/A 10/25/2017    Procedure: Left ventriculography;  Surgeon: Contreras Guillory MD;  Location: [facility] INVASIVE LOCATION;  Service:    • CARDIAC CATHETERIZATION N/A 10/25/2017    Procedure: Coronary angiography;  Surgeon: Contreras Guillory MD;  Location: [facility] INVASIVE LOCATION;  Service:    • CARDIAC CATHETERIZATION  10/25/2017    Procedure: Percutaneous Manual Thrombectomy;  Surgeon: Contreras Guillory MD;  Location: [facility] INVASIVE LOCATION;  Service:    • CORONARY ANGIOPLASTY     • CORONARY STENT PLACEMENT     • ENDOSCOPY N/A 6/17/2021    Procedure: ESOPHAGOGASTRODUODENOSCOPY WITH BIOPSY/ESOPHAGEAL STENT REMOVAL;  Surgeon: Nilton Renteria MD;  Location: [facility] ENDOSCOPY;   Service: Gastroenterology;  Laterality: N/A;  FUNGAL ESOPHAGITIS/STENT REMOVAL/ESOPHAGEAL CANCER   • JOINT REPLACEMENT      knee   • MOLE REMOVAL     • REPLACEMENT TOTAL KNEE BILATERAL       Social History     Socioeconomic History   • Marital status:      Spouse name: Not on file   • Number of children: Not on file   • Years of education: Not on file   • Highest education level: Not on file   Tobacco Use   • Smoking status: Former Smoker     Types: Cigarettes     Start date: 1965     Quit date: 2000     Years since quittin.5   • Smokeless tobacco: Former User   • Tobacco comment: CAFFEINE USE-1 CUPS DECAF COFFEE DAILY    Vaping Use   • Vaping Use: Never used   Substance and Sexual Activity   • Alcohol use: No   • Drug use: No   • Sexual activity: Defer     Family History   Problem Relation Age of Onset   • Hypertension Mother    • Cancer Mother    • Hypertension Father    • Arthritis Father    • No Known Problems Maternal Grandmother    • No Known Problems Maternal Grandfather    • No Known Problems Paternal Grandmother    • No Known Problems Paternal Grandfather        Objective   Physical Exam  Vitals reviewed. Exam conducted with a chaperone present.   Constitutional:       Appearance: Normal appearance.   HENT:      Head: Normocephalic and atraumatic.      Comments: Alopecia    Cardiovascular:      Rate and Rhythm: Normal rate and regular rhythm.      Heart sounds: No murmur heard.   No gallop.    Pulmonary:      Effort: Pulmonary effort is normal.      Breath sounds: Normal breath sounds.   Abdominal:      General: Abdomen is flat. Bowel sounds are normal. There is no distension.      Palpations: Abdomen is soft.      Tenderness: There is no abdominal tenderness.   Musculoskeletal:      Cervical back: Neck supple. No tenderness.      Right lower leg: Edema present.      Left lower leg: Edema present.   Neurological:      Mental Status: He is alert and oriented to person, place, and time.    Psychiatric:         Mood and Affect: Mood normal.         Behavior: Behavior normal.         Vitals:    07/15/21 0916   BP: 130/79   Pulse: 71   Resp: 20   Temp: 97 °F (36.1 °C)   SpO2: 95%   Weight: 89.5 kg (197 lb 5 oz)   PainSc: 0-No pain     ECOG score: 2         PHQ-9 Total Score: 0                  Result Review :   The following data was reviewed by: Valentin Menchaca MD on 07/15/2021:  Lab Results   Component Value Date    HGB 12.1 (L) 07/15/2021    HCT 39.3 07/15/2021    MCV 89.9 07/15/2021     (L) 07/15/2021    WBC 12.21 (H) 07/15/2021    NEUTROABS 11.47 (H) 07/15/2021    LYMPHSABS 0.36 (L) 07/15/2021    MONOSABS 0.30 07/15/2021    EOSABS 0.00 07/15/2021    BASOSABS 0.00 07/15/2021     Lab Results   Component Value Date    GLUCOSE 123 (H) 07/15/2021    BUN 27 (H) 07/15/2021    CREATININE 0.92 07/15/2021     (L) 07/15/2021    K 3.1 (L) 07/15/2021    CL 94 (L) 07/15/2021    CO2 33.0 (H) 07/15/2021    CALCIUM 8.4 (L) 07/15/2021    PROTEINTOT 5.2 (L) 07/15/2021    ALBUMIN 2.55 (L) 07/15/2021    BILITOT 1.7 (H) 07/15/2021    ALKPHOS 521 (H) 07/15/2021    AST 37 07/15/2021    ALT 28 07/15/2021     Data reviewed: Radiation oncology notes reviewed from his recent palliative treatment.  Endoscopy report from 6/17/2021 reviewed demonstrating the removal of his migrated stent      Assessment and Plan    Diagnoses and all orders for this visit:    1. Malignant neoplasm of lower third of esophagus (CMS/HCC) (Primary)  Assessment & Plan:  Metastatic.  Patient is status post multiple lines of therapy and is now on single agent docetaxel.  He has completed palliative radiation to the lower esophagus for obstructive symptoms.  He does note that those are doing better and he is having an easier time with eating and drinking.  He is due for his second cycle of docetaxel.  Lab work today is adequate for treatment.  Proceed with cycle two as planned.  RTC 3 weeks for OV, consideration of cycle three with labs  and restaging scans prior.    Orders:  -     Cancel: sodium chloride 0.9 % infusion 250 mL  -     Cancel: DOCEtaxel 165 mg in sodium chloride 0.9 % 258.3 mL chemo IVPB  -     CT chest w contrast; Future  -     CT abdomen pelvis w contrast; Future  -     CBC & Differential; Future  -     Comprehensive Metabolic Panel; Future    Other orders  -     Cancel: hydrocortisone sodium succinate (Solu-CORTEF) injection 100 mg  -     Cancel: diphenhydrAMINE (BENADRYL) injection 50 mg  -     Cancel: famotidine (PEPCID) injection 20 mg  -     OK To Treat          Patient Follow Up: 3 weeks for OV, cycle three with labs and restaging scans prior    Patient was given instructions and counseling regarding his condition or for health maintenance advice. Please see specific information pulled into the AVS if appropriate.     Valentin Menchaca MD    7/15/2021

## 2021-07-15 NOTE — PROGRESS NOTES
Outpatient Nutrition Oncology Follow Up    Patient Name: Gabriele Connolly  YOB: 1950  MRN: [id number]  Assessment Date: 7/15/2021    CLINICAL NUTRITION ASSESSMENT  Esophageal CA      Type of Cancer Treatment  Docetaxel       CLINICAL NUTRITION ASSESSMENT      Reason for Assessment  Follow-up protocol     H&P:    Past Medical History:   Diagnosis Date   • Arthritis    • ASCVD (arteriosclerotic cardiovascular disease)    • Cancer (CMS/HCC)    • Cerebral infarct (CMS/[facility])    • Coronary artery disease    • Gout    • Hypertension    • Migrated esophageal stent 02/2021   • Pneumonia    • ST elevation myocardial infarction (STEMI) (CMS/[facility]) 10/25/2017   • Stroke (CMS/[facility])    • TIA (transient ischemic attack)         Current Problems:   Patient Active Problem List   Diagnosis Code   • Coronary artery disease involving native coronary artery of native heart without angina pectoris I25.10   • History of coronary artery stent placement Z95.5   • Claudication (CMS/[facility]) I73.9   • Mixed hyperlipidemia E78.2   • Hypertension I10   • Malignant neoplasm of lower third of esophagus (CMS/[facility]) C15.5   • Arthritis M19.90   • Peripheral neuropathy G62.9   • Seropositive rheumatoid arthritis (CMS/[facility]) M05.9   • Stroke (CMS/[facility]) I63.9   • Heart disease I51.9   • Hypercholesterolemia E78.00   • Heart attack (CMS/[facility]) I21.9   • Dysphagia R13.10        Anthropometrics     Row Name 07/15/21 1506          Usual Body Weight (UBW)    Weight Loss Time Frame  4.7% X 2 weeks           BMI kg/m2   There is no height or weight on file to calculate BMI.    Weight Hx  Wt Readings from Last 30 Encounters:   07/15/21 0916 89.5 kg (197 lb 5 oz)   07/15/21 0700 89.5 kg (197 lb 5 oz)   07/06/21 0933 90.2 kg (198 lb 13.7 oz)   06/30/21 0928 93.9 kg (207 lb 0.2 oz)   06/29/21 0911 94.8 kg (208 lb 15.9 oz)   06/24/21 1042 95.3 kg (210 lb 1.6 oz)   06/23/21 1041 95.1 kg (209 lb 10.5 oz)   06/17/21 1105 94.8 kg (208 lb 15.9 oz)   06/16/21 1306  96.2 kg (212 lb)   06/09/21 0841 99.9 kg (220 lb 3.8 oz)   06/01/21 1347 101 kg (222 lb 3.6 oz)   05/11/21 0923 103 kg (227 lb 1.2 oz)   04/12/21 0000 108 kg (238 lb 4 oz)   01/07/21 1436 115 kg (253 lb 8.5 oz)   12/09/20 1115 110 kg (242 lb 8.1 oz)   11/03/20 1043 117 kg (257 lb 15 oz)   09/30/20 0919 117 kg (257 lb 8 oz)   09/17/20 0953 115 kg (253 lb 6.4 oz)   09/16/20 1001 114 kg (250 lb 14.1 oz)   08/26/20 0858 117 kg (258 lb 6.1 oz)   06/17/20 0915 118 kg (259 lb 0.7 oz)   06/03/20 0924 118 kg (260 lb 5.8 oz)   05/06/20 0925 117 kg (257 lb 15 oz)   04/08/20 0921 119 kg (262 lb 9.1 oz)   03/18/20 0919 119 kg (263 lb 3.7 oz)   02/19/20 0938 119 kg (261 lb 7.5 oz)   01/22/20 1005 118 kg (260 lb 12.9 oz)   11/27/19 0915 113 kg (249 lb 12.5 oz)   11/06/19 0947 115 kg (254 lb 3.1 oz)   10/02/19 0907 111 kg (244 lb 7.8 oz)        Labs/Medications        Pertinent Labs Reviewed.   Results from last 7 days   Lab Units 07/15/21  0858   SODIUM mmol/L 135*   POTASSIUM mmol/L 3.1*   CHLORIDE mmol/L 94*   CO2 mmol/L 33.0*   BUN mg/dL 27*   CREATININE mg/dL 0.92   CALCIUM mg/dL 8.4*   BILIRUBIN mg/dL 1.7*   ALK PHOS U/L 521*   ALT (SGPT) U/L 28   AST (SGOT) U/L 37   GLUCOSE mg/dL 123*     Results from last 7 days   Lab Units 07/15/21  0858   HEMOGLOBIN g/dL 12.1*   HEMATOCRIT % 39.3     Coronavirus (COVID-19)   Date Value Ref Range Status   01/11/2021 NOT DETECTED NA Final     Comment:     The SARS-CoV-2 assay is a real-time, RT-PCR test intended  for the qualitative detection of nucleic acid from the  SARS-CoV-2 in respiratory specimens from individuals,  testing performed at [facility] reference lab.       Lab Results   Component Value Date    HGBA1C 5.11 10/26/2017         Pertinent Medications DULoxetine, Fiber, Glucosamine-Chondroitin, HYDROcodone-acetaminophen, Mag Oxide-Vit D3-Turmeric, Vitamin D, allopurinol, aspirin, chlorthalidone, co-enzyme Q-10, dexamethasone, folic acid, furosemide, irbesartan,  levothyroxine sodium, magnesium oxide, methotrexate, metoprolol tartrate, nitroglycerin, ondansetron, oxyCODONE, pantoprazole, pravastatin, and predniSONE     Physical Findings            Current Nutrition Orders & Evaluation of Intake       Oral Nutrition     Current PO Diet Liquid or soft / moist foods per previous RD note   Supplement Ensure Plus or Boost Plus 2-3 times/day     Enteral Nutrition     Current Formula    Schedule      Parenteral Nutrition     Current Prescription    Schedule      Nutrition Diagnosis        Nutrition Dx Problem 1 Inadequate energy Intake related to increased nutrient needs due to catabolic disease as evidenced by weight loss of 4.7% X 2 weeks. .       Nutrition Intervention       RD Action Nutrition Follow up     Monitor/Evaluation       Monitor Per oncology nutrition protocol.     Comments:  Per EMR, pt with continued weight loss - most recently, a weight loss of 4.7% X 2 weeks. Per previous RD note, pt was consuming soft / moist foods as well as liquids. Oncology RD familiar with pt from previous visits. Will continue to recommend High Kcal / High Protein Milkshakes or Nest[employer] VHC 6X/day to help meet increased nutrition needs.   If appropriate, oncology RD can make further suggestions for nutrition support if needed (such as J-tube feedings). Will f/u per protocol.    Due to the current Covid-19 pandemic and circumstances of having to work remotely, nutrition assessment was conducted using review of the medical record and/or telephone interview of the patient.    Electronically signed by:  Sara Waters RD  07/15/21 15:08 EDT

## 2021-07-15 NOTE — SIGNIFICANT NOTE
Pt with difficulty swallowing, preventing him from eating.  14.7lb weight loss in about 1 month. Ammon,rn

## 2021-08-04 ENCOUNTER — APPOINTMENT (OUTPATIENT)
Dept: ONCOLOGY | Facility: HOSPITAL | Age: 71
End: 2021-08-04

## 2021-08-04 ENCOUNTER — HOSPITAL ENCOUNTER (OUTPATIENT)
Dept: ONCOLOGY | Facility: HOSPITAL | Age: 71
Setting detail: INFUSION SERIES
End: 2021-08-04

## 2021-08-06 ENCOUNTER — APPOINTMENT (OUTPATIENT)
Dept: CT IMAGING | Facility: HOSPITAL | Age: 71
End: 2021-08-06

## 2021-08-06 ENCOUNTER — APPOINTMENT (OUTPATIENT)
Dept: RADIATION ONCOLOGY | Facility: HOSPITAL | Age: 71
End: 2021-08-06

## (undated) DEVICE — 6F .070 JR 4 100CM: Brand: CORDIS

## (undated) DEVICE — BND PRESS RADL COMFRT 14IN STRL

## (undated) DEVICE — Device: Brand: DEFENDO AIR/WATER/SUCTION AND BIOPSY VALVE

## (undated) DEVICE — TREK CORONARY DILATATION CATHETER 3.0 MM X 15 MM / RAPID-EXCHANGE: Brand: TREK

## (undated) DEVICE — CATH DIAG IMPULSE FL3.5 5F 100CM

## (undated) DEVICE — GW EMR FIX EXCHG J STD .035 3MM 260CM

## (undated) DEVICE — GLIDESHEATH BASIC HYDROPHILIC COATED INTRODUCER SHEATH: Brand: GLIDESHEATH

## (undated) DEVICE — CATH ASPIR EXPORTADVANCE 6F .014IN 140CM

## (undated) DEVICE — CATH VENT MIV RADL PIG ST TIP 5F 110CM

## (undated) DEVICE — EGD OR ERCP KIT: Brand: MEDLINE INDUSTRIES, INC.

## (undated) DEVICE — SOL IRRG H2O PL/BG 1000ML STRL

## (undated) DEVICE — RUNTHROUGH NS EXTRA FLOPPY PTCA GUIDEWIRE: Brand: RUNTHROUGH

## (undated) DEVICE — SINGLE-USE BIOPSY FORCEPS: Brand: RADIAL JAW 4

## (undated) DEVICE — MINI TREK CORONARY DILATATION CATHETER 2.0 MM X 15 MM / RAPID-EXCHANGE: Brand: MINI TREK

## (undated) DEVICE — KT MANIFLD CARDIAC

## (undated) DEVICE — PK CATH CARD 40

## (undated) DEVICE — NC TREK CORONARY DILATATION CATHETER 3.75 MM X 20 MM / RAPID-EXCHANGE: Brand: NC TREK

## (undated) DEVICE — FRCP GRASP RESCUE RAT/TOOTH ALLGTR 8X230CM